# Patient Record
Sex: FEMALE | Race: BLACK OR AFRICAN AMERICAN | NOT HISPANIC OR LATINO | Employment: FULL TIME | URBAN - METROPOLITAN AREA
[De-identification: names, ages, dates, MRNs, and addresses within clinical notes are randomized per-mention and may not be internally consistent; named-entity substitution may affect disease eponyms.]

---

## 2016-11-10 LAB — ABO + RH BLD: NORMAL

## 2017-01-11 ENCOUNTER — ROUTINE PRENATAL (OUTPATIENT)
Dept: OBSTETRICS AND GYNECOLOGY | Facility: CLINIC | Age: 32
End: 2017-01-11
Payer: COMMERCIAL

## 2017-01-11 ENCOUNTER — LAB VISIT (OUTPATIENT)
Dept: LAB | Facility: OTHER | Age: 32
End: 2017-01-11
Attending: NURSE PRACTITIONER
Payer: COMMERCIAL

## 2017-01-11 VITALS
BODY MASS INDEX: 24.66 KG/M2 | SYSTOLIC BLOOD PRESSURE: 108 MMHG | DIASTOLIC BLOOD PRESSURE: 64 MMHG | WEIGHT: 145.94 LBS

## 2017-01-11 DIAGNOSIS — Z3A.16 16 WEEKS GESTATION OF PREGNANCY: ICD-10-CM

## 2017-01-11 DIAGNOSIS — Z3A.16 16 WEEKS GESTATION OF PREGNANCY: Primary | ICD-10-CM

## 2017-01-11 PROCEDURE — 36415 COLL VENOUS BLD VENIPUNCTURE: CPT

## 2017-01-11 PROCEDURE — 0502F SUBSEQUENT PRENATAL CARE: CPT | Mod: S$GLB,,, | Performed by: NURSE PRACTITIONER

## 2017-01-11 PROCEDURE — 81511 FTL CGEN ABNOR FOUR ANAL: CPT

## 2017-01-11 PROCEDURE — 99999 PR PBB SHADOW E&M-EST. PATIENT-LVL II: CPT | Mod: PBBFAC,,, | Performed by: NURSE PRACTITIONER

## 2017-01-11 NOTE — MR AVS SNAPSHOT
[facility] - OB/GYN [address]  [address]  [address]  [facility] [address]  Phone: [phone]  Fax: [phone]                  Sarah Lee   2017 11:20 AM   Routine Prenatal    Description:  Female : 1985   Provider:  Tameka Dennis NP   Department:  [facility] - OB/GYN [address]           Reason for Visit     Routine Prenatal Visit           Diagnoses this Visit        Comments    16 weeks gestation of pregnancy    -  Primary            To Do List           Future Appointments        Provider Department Dept Phone    2017 11:30 AM Vicki Teran MD [facility] - OB/GYN [address] [phone]    2017 10:20 AM ULTRASOUND, [facility] 4TH [facility]LR CL[facility] [place], operated by [facility] [phone]      Goals (5 Years of Data)     None      Follow-Up and Disposition     Return in about 3 weeks (around 2017) for OB FOLLOW-UP.      [facility]     [facility] Line -  Assistance  Registered nurses in the [facility] provide clinical advisement, health education, appointment booking, and other advisory services.  Call for this free service at [phone].             Medications           Message regarding Medications     Verify the changes and/or additions to your medication regime listed below are the same as discussed with your clinician today.  If any of these changes or additions are incorrect, please notify your healthcare provider.             Verify that the below list of medications is an accurate representation of the medications you are currently taking.  If none reported, the list may be blank. If incorrect, please contact your healthcare provider. Carry this list with you in case of emergency.           Current Medications     PRENATAL VIT #91/FE FUM/FA/DHA (PRENATAL + DHA ORAL) Take by mouth.           Clinical Reference Information           Prenatal Vitals     Enc. Date GA Prenatal Vitals Prenatal Pulse Pain Level Urine Albumin/Glucose  "Edema Presentation Dilation/Effacement/Station    17 16w0d 108/64 / 66.2 kg (145 lb 15.1 oz)  /  / Absent  0  None / None / None / No      16 12w0d 110/60 / 64.5 kg (142 lb 3.2 oz)  / US  0 Negative / Negative None / None      16 12w0d  / 64.5 kg (142 lb 3.2 oz)           11/10/16 7w1d 112/66 / 63.3 kg (139 lb 8.8 oz)  / U/S / Absent             TW.964 kg (10 lb 15.1 oz)   Pregravid weight: 61.2 kg (135 lb)   Height: 5' 4.5" (1.638 m)   BMI: 22.8       Vital Signs - Last Recorded  Most recent update: 2017 11:35 AM by Sarah Noe LPN    BP Wt LMP BMI       108/64 66.2 kg (145 lb 15.1 oz) 2016 (Exact Date) 24.66 kg/m2       Allergies as of 2017     No Known Allergies      Immunizations Administered on Date of Encounter - 2017     None      "

## 2017-01-11 NOTE — PROGRESS NOTES
"Here for routine OB appt at 16w0d, with no complaints.  Denies VB and cramping. Some "flutters".  Pain, bleeding, and PTL precautions given.   F/U scheduled 3 weeks; anatomy scan on 2/1/17; part two SS today.     "

## 2017-01-13 LAB
1ST SAMPLE DATE (SS2): NORMAL
2ND SAMPLE DATE (SS2): NORMAL
ALPHA FETOPROTEIN MATERNAL (SS2): 58.7 NG/ML
DOWN SYNDROME RISK (<1:110) (SS2): NORMAL
ETHNIC ORIGIN (SS2): NORMAL
GEST. AGE (DAYS) 1ST SAMPLE (SS2): 5
GEST. AGE (DAYS) 2ND SAMPLE (SS2): 5
GEST. AGE (WKS) 1ST SAMPLE (SS2): 12
GEST. AGE (WKS) 2ND SAMPLE (SS2): 16
GESTATIONAL AGE METHOD (SS2): NORMAL
HCG (SS2): 59.2 IU/ML
INHIBIN A (SS2): 148.6 PG/ML
INSULIN DEPEND. DIABETES (SS2): NORMAL
M.O.M. AFP  (<2.20) (SS2): 1.4
M.O.M. HCG (SS2): 1.73
M.O.M. INHIBIN A (SS2): 0.93
M.O.M. NUCHAL TRANSLUCENCY (SS2): 0.73
M.O.M. PAPP-A (SS2): 0.71
M.O.M. UNCONJ. ESTRIOL (SS2): 1.06
MATERNAL AGE AT EDD (YRS) (SS2): 32
MATERNAL AGE FOR DOWN (SS2): NORMAL
MATERNAL WEIGHT (LBS) (SS2): 146
MULTIPLE GESTATION (SS2): NORMAL
NASAL BONE (SS2): NORMAL
NUCHAL TRANSLUCENCY (SS2): 1.1 MM
PAPP-A (SS2): 871.6 NG/ML
SEQUENTIAL SCREEN PART 2 INTERP: NORMAL
SEQUENTIAL SCREEN PART 2: NEGATIVE
TRISOMY 18 (<1:100) (SS2): NORMAL
UNCONJUGATED ESTRIOL (SS2): 1.05 NG/ML

## 2017-02-01 ENCOUNTER — OFFICE VISIT (OUTPATIENT)
Dept: MATERNAL FETAL MEDICINE | Facility: CLINIC | Age: 32
End: 2017-02-01
Payer: COMMERCIAL

## 2017-02-01 DIAGNOSIS — Z36.89 ENCOUNTER FOR FETAL ANATOMIC SURVEY: ICD-10-CM

## 2017-02-01 PROCEDURE — 76805 OB US >/= 14 WKS SNGL FETUS: CPT | Mod: 26,S$GLB,, | Performed by: PEDIATRICS

## 2017-02-01 PROCEDURE — 99499 UNLISTED E&M SERVICE: CPT | Mod: S$GLB,,, | Performed by: PEDIATRICS

## 2017-02-02 NOTE — PROGRESS NOTES
Indication:     Anatomy survey (KARISSA JO ANN).   Maternal age (31 years).   Sequential II Screen Negative; DSR 1: 40,000.   ____________________________________________________________________________  History:   Age: 31 years.  ____________________________________________________________________________  Dating:    LMP: 09/21/16 EDC: 06/28/17 GA by LMP: 19w0d  Current Scan on: 02/01/17 EDC: 06/23/17 GA by current scan: 19w5d      Best Overall Assessment: 11/10/16 EDC: 06/28/17 Assessed GA: 19w0d  The calculation of the gestational age by current scan was based on BPD, OFD, HC, TCD, AC, FL and HUM.  The Best Overall Assessment is based on the LMP.  ____________________________________________________________________________  Anatomy Scan:    [place] gestation.    Biometry:    BPD 47.0 mm 91st% 20w2d (19w4d to 20w6d)  .8 mm 71st% 19w4d (18w1d to 21w1d)  .1 mm 73rd% 19w6d (19w1d to 20w4d)  FL 31.5 mm 72nd% 19w5d (18w0d to 21w4d)  OFD 59.0 mm 84th% 19w5d  TCD 19.4 mm 60th% 19w2d  HUM 29.4 mm 68th% 19w5d  CM 4.5 mm 42nd%  NUCHAL FOLD 3.72 mm  EFW (lbs/oz) 0 lbs 11 ozs  EFW (g) 314 g  87th%     Fetal heart activity: present. Fetal heart rate: 149 bpm.   Fetal presentation: breech.   Amniotic fluid: normal.   Cord: 3 vessels.   Placenta: anterior fundal.     Fetal Anatomy:    Visualized with normal appearance: head, spine, neck, skin, chest, abdominal wall, gastrointestinal tract, kidneys, bladder.    Brain: Visualized and normal appearance: cerebral ventricles, choroid plexus, Cisterna Magna, midline falx, cerebellum, cavum septi pellucidi.  Face: profile normal, nose normal, lip normal.  Heart: Visualized and normal appearance: four-chamber view, left outflow tract, right outflow tract, Ductal arch, inferior vena cava, superior vena cava.   Angle: to the fetal left. Four-chamber view: septum appears intact. Aortic arch: Normal.   3 vessel trachial view.  Genitalia: Male fetus.   Extremities: 4 limbs.  Plantar surface of the feet wnl, open hands bilaterally.    Summary of Ultrasound Findings:  Transabdominal US. U/S machine: Xsilon E10.       Impression: The fetal anatomy survey appears normal and fetal growth is appropriate.    ____________________________________________________________________________  Maternal Structures:    Cervical length 33.9 mm.  ____________________________________________________________________________  Report Summary:    Impression:     Normal fetal anatomy with no obvious abnormalities.    Fetal biometry is consistent and concordant with dating.     Normal amniotic fluid volume per qualitative assessment.  Normal placental location without evidence of previa.    Normal appearing cervical length per trans-abdominal screening.     Recommendations:     Suggest repeat scan as you feel clinically indicated.    Thanks once again for allowing us to participate in the care of your patients.  If you have any questions concerning today's consultation feel free to contact me or one of my partners.  We can be reached at [phone] during normal business hours.  If you have a question after normal business hours, please contact Labor and Delivery [phone] and the unit secretary will page our on call physician.

## 2017-02-08 ENCOUNTER — ROUTINE PRENATAL (OUTPATIENT)
Dept: OBSTETRICS AND GYNECOLOGY | Facility: CLINIC | Age: 32
End: 2017-02-08
Payer: COMMERCIAL

## 2017-02-08 VITALS — BODY MASS INDEX: 25.26 KG/M2 | DIASTOLIC BLOOD PRESSURE: 60 MMHG | WEIGHT: 149.5 LBS | SYSTOLIC BLOOD PRESSURE: 100 MMHG

## 2017-02-08 DIAGNOSIS — Z3A.20 20 WEEKS GESTATION OF PREGNANCY: Primary | ICD-10-CM

## 2017-02-08 PROCEDURE — 0502F SUBSEQUENT PRENATAL CARE: CPT | Mod: S$GLB,,, | Performed by: OBSTETRICS & GYNECOLOGY

## 2017-02-08 PROCEDURE — 99999 PR PBB SHADOW E&M-EST. PATIENT-LVL II: CPT | Mod: PBBFAC,,, | Performed by: OBSTETRICS & GYNECOLOGY

## 2017-02-08 NOTE — MR AVS SNAPSHOT
[facility] - [facility] [address]  [address]  [facility] [address]  Phone: [phone]  Fax: [phone]                  Sarah Lee   2017 11:00 AM   Routine Prenatal    Description:  Female : 1985   Provider:  Vicki Teran MD   Department:  [facility] - [facility] [address]           Reason for Visit     Routine Prenatal Visit                To Do List           Goals (5 Years of Data)     None      [facility]     [facility] Line -  Assistance  Registered nurses in the [facility] provide clinical advisement, health education, appointment booking, and other advisory services.  Call for this free service at [phone].             Medications           Message regarding Medications     Verify the changes and/or additions to your medication regime listed below are the same as discussed with your clinician today.  If any of these changes or additions are incorrect, please notify your healthcare provider.             Verify that the below list of medications is an accurate representation of the medications you are currently taking.  If none reported, the list may be blank. If incorrect, please contact your healthcare provider. Carry this list with you in case of emergency.           Current Medications     PRENATAL VIT #91/FE FUM/FA/DHA (PRENATAL + DHA ORAL) Take by mouth.           Clinical Reference Information           Prenatal Vitals     Enc. Date GA Prenatal Vitals Prenatal Pulse Pain Level Urine Albumin/Glucose Edema Presentation Dilation/Effacement/Station    17 20w0d 100/60 / 67.8 kg (149 lb 7.6 oz)   0 Negative / Negative       17 16w0d 108/64 / 66.2 kg (145 lb 15.1 oz)  / 150 / Absent  0  None / None / None / No      16 12w0d 110/60 / 64.5 kg (142 lb 3.2 oz)  / US  0 Negative / Negative None / None      16 12w0d  / 64.5 kg (142 lb 3.2 oz)           11/10/16 7w1d 112/66 / 63.3 kg (139 lb 8.8 oz)  / U/S / Absent     "         TW.564 kg (14 lb 7.6 oz)   Pregravid weight: 61.2 kg (135 lb)   Height: 5' 4.5" (1.638 m)   BMI: 22.8       Your Vitals Were     BP Weight Last Period BMI       100/60 67.8 kg (149 lb 7.6 oz) 2016 (Exact Date) 25.26 kg/m2       Allergies as of 2017     No Known Allergies      Immunizations Administered on Date of Encounter - 2017     None      Language Assistance Services     ATTENTION: Language assistance services are available, free of charge. Please call [phone].      ATENCIÓN: Si habla español, tiene a gavin disposición servicios gratuitos de asistencia lingüística. Llame al [phone].     CHÚ Ý: N?u b?n nói Ti?ng Vi?t, có các d?ch v? h? tr? ngôn ng? mi?n phí dành cho b?n. G?i s? [phone].         [facility] - [facility] [address] complies with applicable Federal civil rights laws and does not discriminate on the basis of race, color, national origin, age, disability, or sex.        "

## 2017-02-08 NOTE — PROGRESS NOTES
HERE for routine OB visit at 20 0/7 wks, with NO complaints.  Denies vaginal bleeding, cramping/ ctx, or LOF.  + FM.  [facility] BID.   OB screen CBC. F /U in four weeks.

## 2017-03-08 ENCOUNTER — ROUTINE PRENATAL (OUTPATIENT)
Dept: OBSTETRICS AND GYNECOLOGY | Facility: CLINIC | Age: 32
End: 2017-03-08
Payer: COMMERCIAL

## 2017-03-08 VITALS
BODY MASS INDEX: 25.34 KG/M2 | DIASTOLIC BLOOD PRESSURE: 62 MMHG | WEIGHT: 149.94 LBS | SYSTOLIC BLOOD PRESSURE: 108 MMHG

## 2017-03-08 DIAGNOSIS — Z3A.24 24 WEEKS GESTATION OF PREGNANCY: Primary | ICD-10-CM

## 2017-03-08 PROCEDURE — 0502F SUBSEQUENT PRENATAL CARE: CPT | Mod: S$GLB,,, | Performed by: NURSE PRACTITIONER

## 2017-03-08 PROCEDURE — 99999 PR PBB SHADOW E&M-EST. PATIENT-LVL II: CPT | Mod: PBBFAC,,, | Performed by: NURSE PRACTITIONER

## 2017-03-08 NOTE — MR AVS SNAPSHOT
[facility] - [facility] [address]  [address]  [address]  [facility] [address]  Phone: [phone]  Fax: [phone]                  Sarah Lee   3/8/2017 11:20 AM   Routine Prenatal    Description:  Female : 1985   Provider:  Tameka Dennis NP   Department:  [facility] - [facility] [address]           Reason for Visit     Routine Prenatal Visit                To Do List           Goals (5 Years of Data)     None      [facility]     [facility] -  Assistance  Registered nurses in the [facility] provide clinical advisement, health education, appointment booking, and other advisory services.  Call for this free service at [phone].             Medications           Message regarding Medications     Verify the changes and/or additions to your medication regime listed below are the same as discussed with your clinician today.  If any of these changes or additions are incorrect, please notify your healthcare provider.             Verify that the below list of medications is an accurate representation of the medications you are currently taking.  If none reported, the list may be blank. If incorrect, please contact your healthcare provider. Carry this list with you in case of emergency.           Current Medications     PRENATAL VIT #91/FE FUM/FA/DHA (PRENATAL + DHA ORAL) Take by mouth.           Clinical Reference Information           Prenatal Vitals     Enc. Date GA Prenatal Vitals Prenatal Pulse Pain Level Urine Albumin/Glucose Edema Presentation Dilation/Effacement/Station    3/8/17 24w0d 108/62 / 68 kg (149 lb 14.6 oz)   0 Negative / Negative       17 20w0d 100/60 / 67.8 kg (149 lb 7.6 oz)  / 150 / Present  0 Negative / Negative None / None / None      17 16w0d 108/64 / 66.2 kg (145 lb 15.1 oz)  / 150 / Absent  0  None / None / None / No      16 12w0d 110/60 / 64.5 kg (142 lb 3.2 oz)  / US  0 Negative / Negative None / None       "16 12w0d  / 64.5 kg (142 lb 3.2 oz)           11/10/16 7w1d 112/66 / 63.3 kg (139 lb 8.8 oz)  / U/S / Absent             TW.764 kg (14 lb 14.6 oz)   Pregravid weight: 61.2 kg (135 lb)   Height: 5' 4.5" (1.638 m)   BMI: 22.8       Your Vitals Were     BP Weight Last Period BMI       108/62 68 kg (149 lb 14.6 oz) 2016 (Exact Date) 25.34 kg/m2       Allergies as of 3/8/2017     No Known Allergies      Immunizations Administered on Date of Encounter - 3/8/2017     None      Language Assistance Services     ATTENTION: Language assistance services are available, free of charge. Please call [phone].      ATENCIÓN: Si habmarco a ashton, tiene a gavin disposición servicios gratuitos de asistencia lingüística. Llame al [phone].     CHÚ Ý: N?u b?n nói Ti?ng Vi?t, có các d?ch v? h? tr? ngôn ng? mi?n phí dành cho b?n. G?i s? [phone].         [facility] - [facility] [address] complies with applicable Federal civil rights laws and does not discriminate on the basis of race, color, national origin, age, disability, or sex.        "

## 2017-03-08 NOTE — PROGRESS NOTES
Here for routine OB appt at 24w0d, with no complaints.  Denies VB and cramping. +FM  Pain, bleeding, and PTL precautions given.  F/U scheduled 4 weeks; CBC and glucose screen at next visit.

## 2017-03-22 ENCOUNTER — LAB VISIT (OUTPATIENT)
Dept: LAB | Facility: OTHER | Age: 32
End: 2017-03-22
Attending: OBSTETRICS & GYNECOLOGY
Payer: COMMERCIAL

## 2017-03-22 ENCOUNTER — ROUTINE PRENATAL (OUTPATIENT)
Dept: OBSTETRICS AND GYNECOLOGY | Facility: CLINIC | Age: 32
End: 2017-03-22
Payer: COMMERCIAL

## 2017-03-22 ENCOUNTER — PATIENT MESSAGE (OUTPATIENT)
Dept: OBSTETRICS AND GYNECOLOGY | Facility: CLINIC | Age: 32
End: 2017-03-22

## 2017-03-22 VITALS — BODY MASS INDEX: 25.6 KG/M2 | SYSTOLIC BLOOD PRESSURE: 120 MMHG | DIASTOLIC BLOOD PRESSURE: 60 MMHG | WEIGHT: 151.44 LBS

## 2017-03-22 DIAGNOSIS — Z3A.20 20 WEEKS GESTATION OF PREGNANCY: ICD-10-CM

## 2017-03-22 DIAGNOSIS — Z3A.26 26 WEEKS GESTATION OF PREGNANCY: Primary | ICD-10-CM

## 2017-03-22 LAB
BASOPHILS # BLD AUTO: 0.01 K/UL
BASOPHILS NFR BLD: 0.1 %
DIFFERENTIAL METHOD: ABNORMAL
EOSINOPHIL # BLD AUTO: 0.1 K/UL
EOSINOPHIL NFR BLD: 1.2 %
ERYTHROCYTE [DISTWIDTH] IN BLOOD BY AUTOMATED COUNT: 13.4 %
GLUCOSE SERPL-MCNC: 104 MG/DL
HCT VFR BLD AUTO: 33.9 %
HGB BLD-MCNC: 11.9 G/DL
LYMPHOCYTES # BLD AUTO: 1.2 K/UL
LYMPHOCYTES NFR BLD: 13.7 %
MCH RBC QN AUTO: 27.7 PG
MCHC RBC AUTO-ENTMCNC: 35.1 %
MCV RBC AUTO: 79 FL
MONOCYTES # BLD AUTO: 0.7 K/UL
MONOCYTES NFR BLD: 7.8 %
NEUTROPHILS # BLD AUTO: 6.8 K/UL
NEUTROPHILS NFR BLD: 76.5 %
PLATELET # BLD AUTO: 236 K/UL
PMV BLD AUTO: 9.5 FL
RBC # BLD AUTO: 4.3 M/UL
WBC # BLD AUTO: 8.84 K/UL

## 2017-03-22 PROCEDURE — 85025 COMPLETE CBC W/AUTO DIFF WBC: CPT

## 2017-03-22 PROCEDURE — 36415 COLL VENOUS BLD VENIPUNCTURE: CPT

## 2017-03-22 PROCEDURE — 82950 GLUCOSE TEST: CPT

## 2017-03-22 PROCEDURE — 99999 PR PBB SHADOW E&M-EST. PATIENT-LVL II: CPT | Mod: PBBFAC,,, | Performed by: OBSTETRICS & GYNECOLOGY

## 2017-03-22 PROCEDURE — 0502F SUBSEQUENT PRENATAL CARE: CPT | Mod: S$GLB,,, | Performed by: OBSTETRICS & GYNECOLOGY

## 2017-03-22 NOTE — PROGRESS NOTES
HERE for routine OB visit at 26 0/7 wks, with pressure in her pelvis.  DIscomfort.  Cannot lift objects at work/ [employer] as prior to pregnancy.  It is causing pain and pressure.  Work excuse. Not to lift over 20 l bs.

## 2017-03-22 NOTE — MR AVS SNAPSHOT
[facility] [address]  [address]  [facility] [address]  Phone: [phone]  Fax: [phone]                  Sarah Lee   3/22/2017 11:15 AM   Routine Prenatal    Description:  Female : 1985   Provider:  Vicki Teran MD   Department:  [facility] - [facility] [address]           Reason for Visit     Routine Prenatal Visit     Swelling     Weakness                To Do List           Goals (5 Years of Data)     None      [facility]     [facility] Line -  Assistance  Registered nurses in the [facility] provide clinical advisement, health education, appointment booking, and other advisory services.  Call for this free service at [phone].             Medications           Message regarding Medications     Verify the changes and/or additions to your medication regime listed below are the same as discussed with your clinician today.  If any of these changes or additions are incorrect, please notify your healthcare provider.             Verify that the below list of medications is an accurate representation of the medications you are currently taking.  If none reported, the list may be blank. If incorrect, please contact your healthcare provider. Carry this list with you in case of emergency.           Current Medications     PRENATAL VIT #91/FE FUM/FA/DHA (PRENATAL + DHA ORAL) Take by mouth.           Clinical Reference Information           Prenatal Vitals     Enc. Date GA Prenatal Vitals Prenatal Pulse Pain Level Urine Albumin/Glucose Edema Presentation Dilation/Effacement/Station    3/22/17 26w0d 120/60 / 68.7 kg (151 lb 7.3 oz)   0 Negative / Negative       3/8/17 24w0d 108/62 / 68 kg (149 lb 14.6 oz) 24 cm / 150 / Present  0 Negative / Negative None / None / None      17 20w0d 100/60 / 67.8 kg (149 lb 7.6 oz)  / 150 / Present  0 Negative / Negative None / None / None      17 16w0d 108/64 / 66.2 kg (145 lb 15.1 oz)  / 150 /  "Absent  0  None / None / None / No      16 12w0d 110/60 / 64.5 kg (142 lb 3.2 oz)  / US  0 Negative / Negative None / None      16 12w0d  / 64.5 kg (142 lb 3.2 oz)           11/10/16 7w1d 112/66 / 63.3 kg (139 lb 8.8 oz)  / U/S / Absent             TW.465 kg (16 lb 7.3 oz)   Pregravid weight: 61.2 kg (135 lb)   Height: 5' 4.5" (1.638 m)   BMI: 22.8       Your Vitals Were     BP Weight Last Period BMI       120/60 68.7 kg (151 lb 7.3 oz) 2016 (Exact Date) 25.6 kg/m2       Allergies as of 3/22/2017     No Known Allergies      Immunizations Administered on Date of Encounter - 3/22/2017     None      Language Assistance Services     ATTENTION: Language assistance services are available, free of charge. Please call [phone].      ATENCIÓN: Si habla español, tiene a gavin disposición servicios gratuitos de asistencia lingüística. Llame al [phone].     JAN Ý: N?u b?n nói Ti?ng Vi?t, có các d?ch v? h? tr? ngôn ng? mi?n phí dành cho b?n. G?i s? [phone].         [facility] [address] complies with applicable Federal civil rights laws and does not discriminate on the basis of race, color, national origin, age, disability, or sex.        "

## 2017-03-30 ENCOUNTER — PATIENT MESSAGE (OUTPATIENT)
Dept: OBSTETRICS AND GYNECOLOGY | Facility: CLINIC | Age: 32
End: 2017-03-30

## 2017-04-01 ENCOUNTER — HOSPITAL ENCOUNTER (EMERGENCY)
Facility: OTHER | Age: 32
Discharge: HOME | End: 2017-04-01
Payer: COMMERCIAL

## 2017-04-01 ENCOUNTER — NURSE TRIAGE (OUTPATIENT)
Dept: ADMINISTRATIVE | Facility: CLINIC | Age: 32
End: 2017-04-01

## 2017-04-01 VITALS
HEART RATE: 97 BPM | TEMPERATURE: 97 F | OXYGEN SATURATION: 100 % | DIASTOLIC BLOOD PRESSURE: 62 MMHG | SYSTOLIC BLOOD PRESSURE: 101 MMHG | RESPIRATION RATE: 17 BRPM

## 2017-04-01 DIAGNOSIS — Z3A.27 27 WEEKS GESTATION OF PREGNANCY: ICD-10-CM

## 2017-04-01 DIAGNOSIS — O26.893 LOW BACK PAIN DURING PREGNANCY IN THIRD TRIMESTER: ICD-10-CM

## 2017-04-01 DIAGNOSIS — M54.50 LOW BACK PAIN DURING PREGNANCY IN THIRD TRIMESTER: ICD-10-CM

## 2017-04-01 DIAGNOSIS — O23.43 UTI IN PREGNANCY, ANTEPARTUM, THIRD TRIMESTER: Primary | ICD-10-CM

## 2017-04-01 LAB
BACTERIA #/AREA URNS HPF: ABNORMAL /HPF
BILIRUB SERPL-MCNC: NORMAL MG/DL
BILIRUB UR QL STRIP: NEGATIVE
BLOOD URINE, POC: NORMAL
CLARITY UR: ABNORMAL
COLOR UR: YELLOW
COLOR, POC UA: YELLOW
GLUCOSE UR QL STRIP: NEGATIVE
GLUCOSE UR QL STRIP: NORMAL
HGB UR QL STRIP: ABNORMAL
HYALINE CASTS #/AREA URNS LPF: 0 /LPF
KETONES UR QL STRIP: NEGATIVE
KETONES UR QL STRIP: NORMAL
LEUKOCYTE ESTERASE UR QL STRIP: NEGATIVE
LEUKOCYTE ESTERASE URINE, POC: NORMAL
MICROSCOPIC COMMENT: ABNORMAL
NITRITE UR QL STRIP: NEGATIVE
NITRITE, POC UA: NORMAL
PH UR STRIP: 6 [PH] (ref 5–8)
PH, POC UA: NORMAL
PROT UR QL STRIP: ABNORMAL
PROTEIN, POC: NORMAL
RBC #/AREA URNS HPF: 3 /HPF (ref 0–4)
SP GR UR STRIP: 1.02 (ref 1–1.03)
SPECIFIC GRAVITY, POC UA: NORMAL
SQUAMOUS #/AREA URNS HPF: 16 /HPF
URN SPEC COLLECT METH UR: ABNORMAL
UROBILINOGEN UR STRIP-ACNC: NEGATIVE EU/DL
UROBILINOGEN, POC UA: NORMAL
WBC #/AREA URNS HPF: 2 /HPF (ref 0–5)

## 2017-04-01 PROCEDURE — 96365 THER/PROPH/DIAG IV INF INIT: CPT

## 2017-04-01 PROCEDURE — 59025 FETAL NON-STRESS TEST: CPT | Mod: 26,,, | Performed by: OBSTETRICS & GYNECOLOGY

## 2017-04-01 PROCEDURE — 87086 URINE CULTURE/COLONY COUNT: CPT

## 2017-04-01 PROCEDURE — 99284 EMERGENCY DEPT VISIT MOD MDM: CPT | Mod: 25

## 2017-04-01 PROCEDURE — 99284 EMERGENCY DEPT VISIT MOD MDM: CPT | Mod: 25,,, | Performed by: OBSTETRICS & GYNECOLOGY

## 2017-04-01 PROCEDURE — 59025 FETAL NON-STRESS TEST: CPT

## 2017-04-01 PROCEDURE — 81002 URINALYSIS NONAUTO W/O SCOPE: CPT

## 2017-04-01 PROCEDURE — 25000003 PHARM REV CODE 250: Performed by: STUDENT IN AN ORGANIZED HEALTH CARE EDUCATION/TRAINING PROGRAM

## 2017-04-01 PROCEDURE — 63600175 PHARM REV CODE 636 W HCPCS: Performed by: STUDENT IN AN ORGANIZED HEALTH CARE EDUCATION/TRAINING PROGRAM

## 2017-04-01 PROCEDURE — 81000 URINALYSIS NONAUTO W/SCOPE: CPT

## 2017-04-01 PROCEDURE — 99283 EMERGENCY DEPT VISIT LOW MDM: CPT | Mod: 25

## 2017-04-01 RX ORDER — ACETAMINOPHEN 325 MG/1
650 TABLET ORAL ONCE
Status: COMPLETED | OUTPATIENT
Start: 2017-04-01 | End: 2017-04-01

## 2017-04-01 RX ORDER — NITROFURANTOIN 25; 75 MG/1; MG/1
100 CAPSULE ORAL 2 TIMES DAILY
Qty: 10 CAPSULE | Refills: 0 | Status: SHIPPED | OUTPATIENT
Start: 2017-04-01 | End: 2017-04-06

## 2017-04-01 RX ADMIN — CEFTRIAXONE SODIUM 1 G: 1 INJECTION, POWDER, FOR SOLUTION INTRAMUSCULAR; INTRAVENOUS at 05:04

## 2017-04-01 RX ADMIN — SODIUM CHLORIDE, SODIUM LACTATE, POTASSIUM CHLORIDE, AND CALCIUM CHLORIDE 500 ML: .6; .31; .03; .02 INJECTION, SOLUTION INTRAVENOUS at 05:04

## 2017-04-01 RX ADMIN — ACETAMINOPHEN 650 MG: 325 TABLET ORAL at 05:04

## 2017-04-01 NOTE — ED PROVIDER NOTES
Encounter Date: 2017       History     Chief Complaint   Patient presents with    Back Pain     Review of patient's allergies indicates:  No Known Allergies  HPI Comments: Sarah Lee is a 31 y.o. [id number] at 27w3d presents complaining of severe back pain. Onset was gradual beginning 2 days ago. States the pain started upper right back but is now in lower back. Any type of movement makes it worse. Nothing makes it better. She has tried hot baths and tylenol. Patient works as a pol[employer] and thinks maybe this has worsened her back pain. She has not felt this type of pain in the past. She denies dysuria, hematuria. Denies F/C, N/V at home. Last dose tylenol this morning around 1000. This IUP is complicated by h/o pre-eclampsia with prior pregnancy and h/o GI bleed due to gastric ulcer.  Patient denies contractions, denies vaginal bleeding, denies LOF.   Fetal Movement: normal.       Past Medical History:   Diagnosis Date    GERD (gastroesophageal reflux disease)     History of bleeding ulcers     upper stomach    Ovarian cyst, left     Stomach ulcer     Tooth abscess     left lower tooth abcess    Ulcer      History reviewed. No pertinent surgical history.  Family History   Problem Relation Age of Onset    Diabetes Paternal Grandmother     Hypertension Paternal Grandmother     Diabetes Maternal Grandmother     Hypertension Maternal Grandmother     Diabetes Mother     Hypertension Mother     Pancreatic cancer Mother 55    Coronary artery disease Father 50      of an MI age 61    Breast cancer Neg Hx     Colon cancer Neg Hx     Ovarian cancer Neg Hx      Social History   Substance Use Topics    Smoking status: Never Smoker    Smokeless tobacco: Never Used    Alcohol use No     Review of Systems   Constitutional: Negative for chills and fever.   HENT: Negative for congestion.    Eyes: Negative for visual disturbance.   Respiratory: Negative for shortness of breath.    Cardiovascular:  Negative for chest pain.   Gastrointestinal: Negative for abdominal pain, nausea and vomiting.   Genitourinary: Negative for dysuria and vaginal bleeding.   Musculoskeletal: Positive for back pain.   Neurological: Negative for headaches.       Physical Exam   Initial Vitals   BP Pulse Resp Temp SpO2   04/01/17 1701 04/01/17 1701 04/01/17 1702 04/01/17 1702 04/01/17 1702   122/75 96 17 97.2 °F (36.2 °C) 100 %     Physical Exam    Constitutional: She appears well-developed and well-nourished. No distress.   HENT:   Head: Normocephalic and atraumatic.   Eyes: Conjunctivae and EOM are normal.   Neck: Normal range of motion. Neck supple.   Cardiovascular: Normal rate and regular rhythm.   Pulmonary/Chest: Breath sounds normal. No respiratory distress.   Abdominal: Soft. She exhibits no distension. There is no tenderness. There is no rebound and no guarding.   Genitourinary:   Genitourinary Comments: No CVA tenderness   Musculoskeletal: Normal range of motion. She exhibits no edema.   No pinpoint tenderness noted   Neurological: She is alert and oriented to person, place, and time.   Skin: Skin is warm and dry.   Psychiatric: She has a normal mood and affect.         ED Course   Procedures  Labs Reviewed   POCT URINALYSIS, DIPSTICK OR TABLET REAGENT, AUTOMATED, WITH MICROSCOP        NST: 140 bpm, mod btbv, +accels, no decels  Gratz: no contractions     Medical Decision Making:   History:   Old Medical Records: I decided to obtain old medical records.  Old Records Summarized: records from clinic visits.       <> Summary of Records: Routine [facility]  Clinical Tests:   Lab Tests: Ordered and Reviewed  Medical Tests: Ordered and Reviewed       APC / Resident Notes:   Back pain, no CVA tenderness  Urine dip with 1+ blood and trace leuks  Fluids, Rocephin x 1, tylenol  Discharge with Macrobid and back care         Attending Attestation:   Physician Attestation Statement for Resident:  As the supervising MD   Physician Attestation  Statement: I have personally seen and examined this patient.   I agree with the above history. -:   As the supervising MD I agree with the above PE.    As the supervising MD I agree with the above treatment, course, plan, and disposition.   -: Patient is stable for discharge home.    I have reviewed and agree with the residents interpretation of the following: lab data and rhythm strips.  I have reviewed the following: old records at this facility.                    ED Course     Clinical Impression:   The primary encounter diagnosis was UTI in pregnancy, antepartum, third trimester. Diagnoses of Low back pain during pregnancy in third trimester and 27 weeks gestation of pregnancy were also pertinent to this visit.    Disposition:   Disposition: Discharged       Cassi Claire MD  Resident  04/01/17 7471

## 2017-04-01 NOTE — DISCHARGE INSTRUCTIONS
Call clinic at [phone] or L&D after hours at [phone] for vaginal bleeding like a period, leakage of fluids like your water broke, contractions in a regular frequent pattern, decreased fetal movements (10 kicks in 2 hours), headache not relieved by Tylenol, blurry vision, pain under your right breast, or temp of 100.4 or greater. Begin doing fetal kick counts, at least 10 movements in 2 hours starting at 28 weeks gestation. Keep next clinic appointment.

## 2017-04-01 NOTE — TELEPHONE ENCOUNTER
LM on VM x 1. Second call ended in busy signal. LM second time at 245pm.     Reason for Disposition   Message left on identified answering machine.    Protocols used: ST NO CONTACT OR DUPLICATE CONTACT CALL-A-AH

## 2017-04-01 NOTE — ED AVS SNAPSHOT
[facility]  [address]  [facility] [address]               Sarah Lee   2017  4:46 PM   ED    Description:  Female : 1985   Department:  [facility]           Your Care was Coordinated By:     Provider Role From To    Fifi Rendon MD Resident 17 [id number] --      Reason for Visit     Back Pain           Diagnoses this Visit        Comments    UTI in pregnancy, antepartum, third trimester    -  Primary     Low back pain during pregnancy in third trimester         27 weeks gestation of pregnancy           ED Disposition     ED Disposition Condition Comment    Discharge             To Do List           Follow-up Information     Follow up with Vicki Teran MD.    Specialties:  Obstetrics, Obstetrics and Gynecology    Contact information:    [address] [place] [address]  [facility] [address]  [phone]         These Medications        Disp Refills Start End    nitrofurantoin, macrocrystal-monohydrate, (MACROBID) 100 MG capsule 10 capsule 0 2017    Take 1 capsule (100 mg total) by mouth 2 (two) times daily. - Oral    Pharmacy: [facility] [address] [place] [address] AT [facility] Ph #: [phone]         [facility] On Call     [facility] Nurse Care Line - 24/ Assistance  Unless otherwise directed by your provider, please contact [facility] On-Call, our nurse care line that is available for 24/7 assistance.     Registered nurses in the [facility] Center provide: appointment scheduling, clinical advisement, health education, and other advisory services.  Call: [phone] (toll free)               Medications           Message regarding Medications     Verify the changes and/or additions to your medication regime listed below are the same as discussed with your clinician today.  If any of these changes or additions are incorrect, please notify your healthcare  provider.        START taking these NEW medications        Refills    nitrofurantoin, macrocrystal-monohydrate, (MACROBID) 100 MG capsule 0    Sig: Take 1 capsule (100 mg total) by mouth 2 (two) times daily.    Class: Normal    Route: Oral      These medications were administered today        Dose Freq    cefTRIAXone (ROCEPHIN) 1 g in dextrose 5 % 50 mL IVPB 1 g Once    Sig: Inject 50 mLs (1 g total) into the vein once.    Class: Normal    Route: Intravenous    lactated ringers bolus 500 mL 500 mL Once    Sig: Inject 500 mLs into the vein once.    Class: Normal    Route: Intravenous    acetaminophen tablet 650 mg 650 mg Once    Sig: Take 2 tablets (650 mg total) by mouth once.    Class: Normal    Route: Oral    acetaminophen (TYLENOL) 500 MG tablet      Notes to Pharmacy: Created by cabinet override           Verify that the below list of medications is an accurate representation of the medications you are currently taking.  If none reported, the list may be blank. If incorrect, please contact your healthcare provider. Carry this list with you in case of emergency.           Current Medications     nitrofurantoin, macrocrystal-monohydrate, (MACROBID) 100 MG capsule Take 1 capsule (100 mg total) by mouth 2 (two) times daily.    PRENATAL VIT #91/FE FUM/FA/DHA (PRENATAL + DHA ORAL) Take by mouth.           Clinical Reference Information           Your Vitals Were     BP Pulse Temp Resp Last Period SpO2    101/62 97 97.2 °F (36.2 °C) (Temporal) 17 09/21/2016 (Exact Date) 100%      Allergies as of 4/1/2017     No Known Allergies      Immunizations Administered on Date of Encounter - 4/1/2017     None      ED Micro, Lab, POCT     Start Ordered       Status Ordering Provider    04/01/17 1721 04/01/17 1720  Urinalysis  STAT      Final result     04/01/17 1721 04/01/17 1720  Urine culture  Once      In process     04/01/17 1720 04/01/17 1720  Urinalysis Microscopic  Once      Final result     04/01/17 1716 04/01/17 1715   POCT urinalysis, dipstick or tablet reag  Once      Final result       ED Imaging Orders     None        Discharge Instructions       Call clinic at [phone] or L&D after hours at [phone] for vaginal bleeding like a period, leakage of fluids like your water broke, contractions in a regular frequent pattern, decreased fetal movements (10 kicks in 2 hours), headache not relieved by Tylenol, blurry vision, pain under your right breast, or temp of 100.4 or greater. Begin doing fetal kick counts, at least 10 movements in 2 hours starting at 28 weeks gestation. Keep next clinic appointment.    Discharge References/Attachments     BACK PAIN DURING PREGNANCY, RELIEVING: TAILOR SIT, TRUNK TURN (ENGLISH)    BACK PAIN DURING PREGNANCY, RELIEVING: WALL STRETCH, BODY BEND (ENGLISH)      Your Scheduled Appointments     Apr 19, 2017 10:40 AM CDT   Routine Prenatal Visit with Tameka Dennis NP   [facility] [address] ([facility])    [address] [address] [phone]               [facility] complies with applicable Federal civil rights laws and does not discriminate on the basis of race, color, national origin, age, disability, or sex.        Language Assistance Services     ATTENTION: Language assistance services are available, free of charge. Please call [phone].      ATENCIÓN: Si habla español, tiene a gavin disposición servicios gratuitos de asistencia lingüística. Llame al [phone].     CHÚ Ý: N?u b?n nói Ti?ng Vi?t, có các d?ch v? h? tr? ngôn ng? mi?n phí dành cho b?n. G?i s? [phone].

## 2017-04-02 NOTE — ED NOTES
VSS. Discharge instructions provided to patient at bedside. Patient states understanding of instructions and willingness to comply. Patient ambulatory, escorted by nurse to reception.

## 2017-04-03 LAB
BACTERIA UR CULT: NORMAL
BACTERIA UR CULT: NORMAL

## 2017-04-19 ENCOUNTER — ROUTINE PRENATAL (OUTPATIENT)
Dept: OBSTETRICS AND GYNECOLOGY | Facility: CLINIC | Age: 32
End: 2017-04-19
Payer: COMMERCIAL

## 2017-04-19 VITALS
BODY MASS INDEX: 26.08 KG/M2 | SYSTOLIC BLOOD PRESSURE: 110 MMHG | WEIGHT: 154.31 LBS | DIASTOLIC BLOOD PRESSURE: 70 MMHG

## 2017-04-19 DIAGNOSIS — Z3A.30 30 WEEKS GESTATION OF PREGNANCY: Primary | ICD-10-CM

## 2017-04-19 DIAGNOSIS — Z87.440 HISTORY OF UTI: ICD-10-CM

## 2017-04-19 PROCEDURE — 87086 URINE CULTURE/COLONY COUNT: CPT

## 2017-04-19 PROCEDURE — 99999 PR PBB SHADOW E&M-EST. PATIENT-LVL II: CPT | Mod: PBBFAC,,, | Performed by: NURSE PRACTITIONER

## 2017-04-19 PROCEDURE — 0502F SUBSEQUENT PRENATAL CARE: CPT | Mod: S$GLB,,, | Performed by: NURSE PRACTITIONER

## 2017-04-19 NOTE — PROGRESS NOTES
Here for routine OB appt at 30w0d, with no complaints.  Reports good FM.  Denies LOF, denies VB, denies contractions. Went to [facility] on 4/1/17 with back pain. Dx and tx with macrobid for UTI. Urine cx CONCEPCION today. Denies UTI s/s.  Discussed in DETAIL with pt and FOB that [facility] does have an actual nursery, but the baby will be rooming in with mom as we do couplet care.   Reviewed warning signs of Labor and Preeclampsia.  Daily FM counts reinforced.  F/U scheduled 2 weeks

## 2017-04-21 LAB
BACTERIA UR CULT: NORMAL
BACTERIA UR CULT: NORMAL

## 2017-05-03 ENCOUNTER — ROUTINE PRENATAL (OUTPATIENT)
Dept: OBSTETRICS AND GYNECOLOGY | Facility: CLINIC | Age: 32
End: 2017-05-03
Payer: COMMERCIAL

## 2017-05-03 VITALS — SYSTOLIC BLOOD PRESSURE: 116 MMHG | BODY MASS INDEX: 27.72 KG/M2 | DIASTOLIC BLOOD PRESSURE: 78 MMHG | WEIGHT: 164 LBS

## 2017-05-03 DIAGNOSIS — Z3A.32 32 WEEKS GESTATION OF PREGNANCY: Primary | ICD-10-CM

## 2017-05-03 PROCEDURE — 0502F SUBSEQUENT PRENATAL CARE: CPT | Mod: S$GLB,,, | Performed by: NURSE PRACTITIONER

## 2017-05-03 PROCEDURE — 99999 PR PBB SHADOW E&M-EST. PATIENT-LVL II: CPT | Mod: PBBFAC,,, | Performed by: NURSE PRACTITIONER

## 2017-05-03 NOTE — MR AVS SNAPSHOT
[facility] [address]  [address]  [address]  [facility] [address]  Phone: [phone]  Fax: [phone]                  Sarah Lee   5/3/2017 10:20 AM   Routine Prenatal    Description:  Female : 1985   Provider:  Tameka Dennis NP   Department:  [facility] [address]           Reason for Visit     Routine Prenatal Visit                To Do List           Goals (5 Years of Data)     None      [facility]     [facility] On Call Nurse Care Line -  Assistance  Unless otherwise directed by your provider, please contact [facility], our nurse care line that is available for  assistance.     Registered nurses in the [facility] On Call Center provide: appointment scheduling, clinical advisement, health education, and other advisory services.  Call: [phone] (toll free)               Medications           Message regarding Medications     Verify the changes and/or additions to your medication regime listed below are the same as discussed with your clinician today.  If any of these changes or additions are incorrect, please notify your healthcare provider.             Verify that the below list of medications is an accurate representation of the medications you are currently taking.  If none reported, the list may be blank. If incorrect, please contact your healthcare provider. Carry this list with you in case of emergency.           Current Medications     PRENATAL VIT #91/FE FUM/FA/DHA (PRENATAL + DHA ORAL) Take by mouth.           Clinical Reference Information           Prenatal Vitals     Enc. Date GA Prenatal Vitals Prenatal Pulse Pain Level Urine Albumin/Glucose Edema Presentation Dilation/Effacement/Station    5/3/17 32w0d 116/78 / 74.4 kg (164 lb 0.4 oz)   0 Negative / Negative       17 30w0d 110/70 / 70 kg (154 lb 5.2 oz) 30 cm / +++ / Present  0 Negative / Negative None / None / None      17 27w3d Admission Dept: [facility] OB ER    3/22/17 26w0d  "120/60 / 68.7 kg (151 lb 7.3 oz) 26 cm / 150 / Present  0 Negative / Negative None / None / None      3/8/17 24w0d 108/62 / 68 kg (149 lb 14.6 oz) 24 cm / 150 / Present  0 Negative / Negative None / None / None      17 20w0d 100/60 / 67.8 kg (149 lb 7.6 oz)  / 150 / Present  0 Negative / Negative None / None / None      17 16w0d 108/64 / 66.2 kg (145 lb 15.1 oz)  / 150 / Absent  0  None / None / None / No      16 12w0d 110/60 / 64.5 kg (142 lb 3.2 oz)  / US  0 Negative / Negative None / None      16 12w0d  / 64.5 kg (142 lb 3.2 oz)           11/10/16 7w1d 112/66 / 63.3 kg (139 lb 8.8 oz)  / U/S / Absent             TW.2 kg (29 lb 0.4 oz)   Pregravid weight: 61.2 kg (135 lb)   Height: 5' 4.5" (1.638 m)   BMI: 22.8       Your Vitals Were     BP Weight Last Period BMI       116/78 74.4 kg (164 lb 0.4 oz) 2016 (Exact Date) 27.72 kg/m2       Allergies as of 5/3/2017     No Known Allergies      Immunizations Administered on Date of Encounter - 5/3/2017     None      Language Assistance Services     ATTENTION: Language assistance services are available, free of charge. Please call [phone].      ATENCIÓN: Si habla karmenañol, tiene a gavin disposición servicios gratuitos de asistencia lingüística. Llame al [phone].     CHÚ Ý: N?u b?n nói Ti?ng Vi?t, có các d?ch v? h? tr? ngôn ng? mi?n phí dành cho b?n. G?i s? [phone].         [facility] [address] complies with applicable Federal civil rights laws and does not discriminate on the basis of race, color, national origin, age, disability, or sex.        "

## 2017-05-03 NOTE — PROGRESS NOTES
Here for routine OB appt at 32w0d,  Here with her . C/o discomfort while at work. 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 Reports her feet and legs hurt during her shift. Discussed maternity belt, compression stockings, and hydration.  Reports good FM.  Denies LOF, denies VB, denies contractions. Discussed tdap-pt declines. Pt undecided about PP contraception. [facility] [place].   Reviewed warning signs of Labor and Preeclampsia.  Daily FM counts reinforced.  F/U scheduled 2 weeks

## 2017-05-17 ENCOUNTER — ROUTINE PRENATAL (OUTPATIENT)
Dept: OBSTETRICS AND GYNECOLOGY | Facility: CLINIC | Age: 32
End: 2017-05-17
Payer: COMMERCIAL

## 2017-05-17 VITALS — SYSTOLIC BLOOD PRESSURE: 110 MMHG | DIASTOLIC BLOOD PRESSURE: 88 MMHG | WEIGHT: 160.94 LBS | BODY MASS INDEX: 27.2 KG/M2

## 2017-05-17 DIAGNOSIS — R82.998 LEUKOCYTES IN URINE: ICD-10-CM

## 2017-05-17 DIAGNOSIS — Z3A.34 34 WEEKS GESTATION OF PREGNANCY: Primary | ICD-10-CM

## 2017-05-17 PROCEDURE — 87086 URINE CULTURE/COLONY COUNT: CPT

## 2017-05-17 PROCEDURE — 87077 CULTURE AEROBIC IDENTIFY: CPT

## 2017-05-17 PROCEDURE — 99999 PR PBB SHADOW E&M-EST. PATIENT-LVL III: CPT | Mod: PBBFAC,,, | Performed by: NURSE PRACTITIONER

## 2017-05-17 PROCEDURE — 0502F SUBSEQUENT PRENATAL CARE: CPT | Mod: S$GLB,,, | Performed by: NURSE PRACTITIONER

## 2017-05-17 PROCEDURE — 87186 SC STD MICRODIL/AGAR DIL: CPT

## 2017-05-17 PROCEDURE — 87088 URINE BACTERIA CULTURE: CPT

## 2017-05-17 NOTE — LETTER
May 17, 2017    Sarah Lee  [address]  [address]  [facility] [address]              [facility] [address]  [address] [facility] [address]  [facility] [address]  Phone: [phone]  Fax: [phone]    To Whom It May Concern:    Ms. Lee is currently under our care for pregnancy. Please allow pt frequent breaks throughout her shift to sit down and hydrate. She cannot do any heavy lifting or pushing over 10 pounds. Please call with any questions or concerns. Her due date is 6/28/17.        Sincerely,    Tameka Dennis, NP

## 2017-05-17 NOTE — PROGRESS NOTES
"Here for routine OB appt at 34w0d here with her  with c/o having episodes of feeling faint and lightheaded at work; some vaginal pressure and back pain. Pt works at [employer] with 8 hour shifts. States she has called in sick multiple times. Pt no longer wants to work, feels bad after 10 minutes. She and her  want a letter stating that. Rates pain 7/10 today- "everything hurts". 1/60/-3. BP WNL. Discussed frequent breaks/snacks and hydration. Told to go to ED if she ever passes out or the feeling does not spontaneously resolve. Reports good FM.  Denies LOF, denies VB, denies contractions.  Reviewed warning signs of Labor and Preeclampsia.  Daily FM counts reinforced.  F/U scheduled 2 weeks; 3T labs and GBS at next visit.     "

## 2017-05-19 ENCOUNTER — TELEPHONE (OUTPATIENT)
Dept: OBSTETRICS AND GYNECOLOGY | Facility: CLINIC | Age: 32
End: 2017-05-19

## 2017-05-19 ENCOUNTER — PATIENT MESSAGE (OUTPATIENT)
Dept: OBSTETRICS AND GYNECOLOGY | Facility: CLINIC | Age: 32
End: 2017-05-19

## 2017-05-19 DIAGNOSIS — O23.43 UTI IN PREGNANCY, THIRD TRIMESTER: Primary | ICD-10-CM

## 2017-05-19 LAB — BACTERIA UR CULT: NORMAL

## 2017-05-19 RX ORDER — NITROFURANTOIN 25; 75 MG/1; MG/1
100 CAPSULE ORAL 2 TIMES DAILY
Qty: 14 CAPSULE | Refills: 0 | Status: SHIPPED | OUTPATIENT
Start: 2017-05-19 | End: 2017-05-22 | Stop reason: SDUPTHER

## 2017-05-19 NOTE — TELEPHONE ENCOUNTER
----- Message from Tameka Dennis NP sent at 5/19/2017  9:31 AM CDT -----  Regarding: UTI-meds sent to pharmacy  Please notify pt that her urine culture showed a UTI. I sent in macrobid-1 pill twice daily x 7 days. Sent to [facility] [address]. We will repeat urine culture at next visit for a test of cure.     Thanks,   Tameka Dennis NP

## 2017-05-19 NOTE — TELEPHONE ENCOUNTER
Called patient and informed her of UTI and that her prescription of was called in per MELVINA Dennis NP note.

## 2017-05-21 ENCOUNTER — PATIENT MESSAGE (OUTPATIENT)
Dept: OBSTETRICS AND GYNECOLOGY | Facility: CLINIC | Age: 32
End: 2017-05-21

## 2017-05-22 ENCOUNTER — PATIENT MESSAGE (OUTPATIENT)
Dept: OBSTETRICS AND GYNECOLOGY | Facility: CLINIC | Age: 32
End: 2017-05-22

## 2017-05-22 RX ORDER — NITROFURANTOIN 25; 75 MG/1; MG/1
100 CAPSULE ORAL 2 TIMES DAILY
Qty: 14 CAPSULE | Refills: 0 | Status: SHIPPED | OUTPATIENT
Start: 2017-05-22 | End: 2017-05-29

## 2017-05-22 NOTE — TELEPHONE ENCOUNTER
Patient is requesting that UTI medication is sent to [facility] because [facility] does not take her insurance.

## 2017-06-02 ENCOUNTER — TELEPHONE (OUTPATIENT)
Dept: OBSTETRICS AND GYNECOLOGY | Facility: CLINIC | Age: 32
End: 2017-06-02

## 2017-06-02 ENCOUNTER — ROUTINE PRENATAL (OUTPATIENT)
Dept: OBSTETRICS AND GYNECOLOGY | Facility: CLINIC | Age: 32
End: 2017-06-02
Payer: COMMERCIAL

## 2017-06-02 ENCOUNTER — HOSPITAL ENCOUNTER (OUTPATIENT)
Dept: PERINATAL CARE | Facility: OTHER | Age: 32
Discharge: HOME | End: 2017-06-02
Attending: OBSTETRICS & GYNECOLOGY
Payer: COMMERCIAL

## 2017-06-02 VITALS
BODY MASS INDEX: 27.53 KG/M2 | SYSTOLIC BLOOD PRESSURE: 140 MMHG | DIASTOLIC BLOOD PRESSURE: 70 MMHG | WEIGHT: 162.94 LBS

## 2017-06-02 DIAGNOSIS — O13.3 GESTATIONAL HYPERTENSION, THIRD TRIMESTER: ICD-10-CM

## 2017-06-02 DIAGNOSIS — Z3A.36 36 WEEKS GESTATION OF PREGNANCY: Primary | ICD-10-CM

## 2017-06-02 LAB
CREAT UR-MCNC: 168 MG/DL
PROT UR-MCNC: 37 MG/DL
PROT/CREAT RATIO, UR: 0.22

## 2017-06-02 PROCEDURE — 76815 OB US LIMITED FETUS(S): CPT | Mod: 26,,, | Performed by: OBSTETRICS & GYNECOLOGY

## 2017-06-02 PROCEDURE — 0502F SUBSEQUENT PRENATAL CARE: CPT | Mod: S$GLB,,, | Performed by: OBSTETRICS & GYNECOLOGY

## 2017-06-02 PROCEDURE — 99999 PR PBB SHADOW E&M-EST. PATIENT-LVL III: CPT | Mod: PBBFAC,,, | Performed by: OBSTETRICS & GYNECOLOGY

## 2017-06-02 PROCEDURE — 84156 ASSAY OF PROTEIN URINE: CPT

## 2017-06-02 PROCEDURE — 76815 OB US LIMITED FETUS(S): CPT

## 2017-06-02 PROCEDURE — 59025 FETAL NON-STRESS TEST: CPT

## 2017-06-02 PROCEDURE — 59025 FETAL NON-STRESS TEST: CPT | Mod: 26,,, | Performed by: OBSTETRICS & GYNECOLOGY

## 2017-06-02 NOTE — PROGRESS NOTES
HERE for routine OB visit at 36 2/7 wks, with NO complaints.  Denies vaginal bleeding, cramping/ ctx, or LOF.  + FM.  FMC BID.    Elevated BP- no PRE E sx. History of PRE E.  PRE E labs, PNT twice weekly.  F/U in one week.

## 2017-06-02 NOTE — TELEPHONE ENCOUNTER
Spoke with pt. Pt offered/accepted an appt for 6/9 with  partner while she is out on vacation. Pt aware time/location/whom she will be visiting. Will mail slip

## 2017-06-02 NOTE — TELEPHONE ENCOUNTER
----- Message from Vargas Parker MA sent at 6/2/2017  3:05 PM CDT -----  Pt needs an appt next week with partner. Nothing avail

## 2017-06-03 ENCOUNTER — PATIENT MESSAGE (OUTPATIENT)
Dept: OBSTETRICS AND GYNECOLOGY | Facility: CLINIC | Age: 32
End: 2017-06-03

## 2017-06-03 NOTE — PROGRESS NOTES
Indication  ========    NST/MVP: elevated b/p.    Maternal Assessment  =================    BP syst 151 mmHg  BP diast 90 mmHg  Other: 141/81, 138/89    Pregnancy  =========    [place] pregnancy. Number of fetuses: 1.    Dating  ======    LMP on: 9/21/2016  GA by LMP 36 w + 2 d  PRANAV by LMP: 6/28/2017  Assigned: The Best Overall Assessment is based on the LMP.  Assigned GA 36 w + 2 d  Assigned PRANAV: 6/28/2017    General Evaluation  ==============    Cardiac activity: present.  bpm.  Fetal movements: visualized.  Presentation: cephalic.  Placenta: anterior.    Non Stress Test  =============    NST interpretation: reactive. Test duration 36 min. Baseline  bpm. Baseline variability: moderate. Accelerations: present.  Decelerations: absent. Uterine activity: present, irreggular. Acoustic stimulation: no  reports + fetal movement, denies reg ctx, vag bleeding or loss of fluid; denies headache, visual changes or epigastric pain. Reviewed FMC,  S&S PIH.    Amniotic Fluid Assessment  =====================    Amount of AF: normal amount  MVP 3.4 cm    Impression  =========    Discussed b/p with Dr Teran. repeat BP improved. Labs drawn outpatient. Oked for discharge. To F/U in PNT on Monday    Reactive NST;  Normal amniotic fluid volume.    Recommendation  ==============    Continue fetal surveillance as clinically indicated.

## 2017-06-05 ENCOUNTER — HOSPITAL ENCOUNTER (EMERGENCY)
Facility: OTHER | Age: 32
Discharge: HOME | End: 2017-06-05
Attending: OBSTETRICS & GYNECOLOGY
Payer: COMMERCIAL

## 2017-06-05 ENCOUNTER — HOSPITAL ENCOUNTER (OUTPATIENT)
Dept: PERINATAL CARE | Facility: OTHER | Age: 32
Discharge: HOME | End: 2017-06-05
Attending: OBSTETRICS & GYNECOLOGY
Payer: COMMERCIAL

## 2017-06-05 VITALS
RESPIRATION RATE: 16 BRPM | HEART RATE: 85 BPM | SYSTOLIC BLOOD PRESSURE: 135 MMHG | OXYGEN SATURATION: 100 % | TEMPERATURE: 97 F | DIASTOLIC BLOOD PRESSURE: 87 MMHG

## 2017-06-05 DIAGNOSIS — O13.3 GESTATIONAL HYPERTENSION, THIRD TRIMESTER: ICD-10-CM

## 2017-06-05 DIAGNOSIS — O14.93 PRE-ECLAMPSIA, ANTEPARTUM, THIRD TRIMESTER: Primary | ICD-10-CM

## 2017-06-05 DIAGNOSIS — Z3A.36 36 WEEKS GESTATION OF PREGNANCY: ICD-10-CM

## 2017-06-05 LAB
ALBUMIN SERPL BCP-MCNC: 2.8 G/DL
ALP SERPL-CCNC: 170 U/L
ALT SERPL W/O P-5'-P-CCNC: 9 U/L
ANION GAP SERPL CALC-SCNC: 8 MMOL/L
ANISOCYTOSIS BLD QL SMEAR: SLIGHT
AST SERPL-CCNC: 17 U/L
BASOPHILS # BLD AUTO: 0.02 K/UL
BASOPHILS NFR BLD: 0.2 %
BILIRUB SERPL-MCNC: 0.6 MG/DL
BUN SERPL-MCNC: 8 MG/DL
CALCIUM SERPL-MCNC: 8.8 MG/DL
CHLORIDE SERPL-SCNC: 108 MMOL/L
CO2 SERPL-SCNC: 21 MMOL/L
CREAT SERPL-MCNC: 0.7 MG/DL
CREAT UR-MCNC: 29.6 MG/DL
DIFFERENTIAL METHOD: ABNORMAL
EOSINOPHIL # BLD AUTO: 0.1 K/UL
EOSINOPHIL NFR BLD: 0.7 %
ERYTHROCYTE [DISTWIDTH] IN BLOOD BY AUTOMATED COUNT: 14.1 %
EST. GFR  (AFRICAN AMERICAN): >60 ML/MIN/1.73 M^2
EST. GFR  (NON AFRICAN AMERICAN): >60 ML/MIN/1.73 M^2
GLUCOSE SERPL-MCNC: 70 MG/DL
HCT VFR BLD AUTO: 33.7 %
HGB BLD-MCNC: 11.2 G/DL
HYPOCHROMIA BLD QL SMEAR: ABNORMAL
LDH SERPL L TO P-CCNC: 238 U/L
LYMPHOCYTES # BLD AUTO: 1.2 K/UL
LYMPHOCYTES NFR BLD: 14.6 %
MCH RBC QN AUTO: 24.9 PG
MCHC RBC AUTO-ENTMCNC: 33.2 %
MCV RBC AUTO: 75 FL
MONOCYTES # BLD AUTO: 0.9 K/UL
MONOCYTES NFR BLD: 10.6 %
NEUTROPHILS # BLD AUTO: 6 K/UL
NEUTROPHILS NFR BLD: 73.9 %
OVALOCYTES BLD QL SMEAR: ABNORMAL
PLATELET # BLD AUTO: 215 K/UL
PLATELET BLD QL SMEAR: ABNORMAL
PMV BLD AUTO: 9.8 FL
POIKILOCYTOSIS BLD QL SMEAR: SLIGHT
POLYCHROMASIA BLD QL SMEAR: ABNORMAL
POTASSIUM SERPL-SCNC: 4.3 MMOL/L
PROT SERPL-MCNC: 6.6 G/DL
PROT UR-MCNC: 14 MG/DL
PROT/CREAT RATIO, UR: 0.47
RBC # BLD AUTO: 4.5 M/UL
SODIUM SERPL-SCNC: 137 MMOL/L
WBC # BLD AUTO: 8.17 K/UL

## 2017-06-05 PROCEDURE — 87086 URINE CULTURE/COLONY COUNT: CPT

## 2017-06-05 PROCEDURE — 80053 COMPREHEN METABOLIC PANEL: CPT

## 2017-06-05 PROCEDURE — 76815 OB US LIMITED FETUS(S): CPT | Mod: 26,,, | Performed by: OBSTETRICS & GYNECOLOGY

## 2017-06-05 PROCEDURE — 36415 COLL VENOUS BLD VENIPUNCTURE: CPT

## 2017-06-05 PROCEDURE — 83615 LACTATE (LD) (LDH) ENZYME: CPT

## 2017-06-05 PROCEDURE — 82570 ASSAY OF URINE CREATININE: CPT

## 2017-06-05 PROCEDURE — 59025 FETAL NON-STRESS TEST: CPT | Mod: 26,,, | Performed by: OBSTETRICS & GYNECOLOGY

## 2017-06-05 PROCEDURE — 99283 EMERGENCY DEPT VISIT LOW MDM: CPT | Mod: ,,, | Performed by: OBSTETRICS & GYNECOLOGY

## 2017-06-05 PROCEDURE — 85025 COMPLETE CBC W/AUTO DIFF WBC: CPT

## 2017-06-05 PROCEDURE — 59025 FETAL NON-STRESS TEST: CPT

## 2017-06-05 PROCEDURE — 99283 EMERGENCY DEPT VISIT LOW MDM: CPT

## 2017-06-05 NOTE — ED PROVIDER NOTES
Encounter Date: 2017       History     Chief Complaint   Patient presents with    Hypertension     Review of patient's allergies indicates:  No Known Allergies  Sarah Lee is a 31 y.o. [id number] at 36w5d sent from prenatal testing for elevated BPs. Pt denies HA/vision changes/CP/SOB. Denies lightheadedness/dizziness. Denies RUQ or epigastric pain.  This IUP is complicated by history of pre-eclampsia.  Patient denies contractions, denies vaginal bleeding, denies LOF.   Fetal Movement: normal.          Past Medical History:   Diagnosis Date    GERD (gastroesophageal reflux disease)     Gestational hypertension     History of bleeding ulcers     upper stomach    Ovarian cyst, left     Stomach ulcer     Tooth abscess     left lower tooth abcess    Ulcer      History reviewed. No pertinent surgical history.  Family History   Problem Relation Age of Onset    Diabetes Paternal Grandmother     Hypertension Paternal Grandmother     Diabetes Maternal Grandmother     Hypertension Maternal Grandmother     Diabetes Mother     Hypertension Mother     Pancreatic cancer Mother 55    Coronary artery disease Father 50      of an MI age 61    Breast cancer Neg Hx     Colon cancer Neg Hx     Ovarian cancer Neg Hx      Social History   Substance Use Topics    Smoking status: Never Smoker    Smokeless tobacco: Never Used    Alcohol use No     Review of Systems   Constitutional: Negative for fever.   HENT: Negative for sore throat.    Respiratory: Negative for shortness of breath.    Cardiovascular: Negative for chest pain.   Gastrointestinal: Negative for nausea.   Genitourinary: Negative for dysuria.   Musculoskeletal: Negative for back pain.   Skin: Negative for rash.   Neurological: Negative for weakness and headaches.   Hematological: Does not bruise/bleed easily.       Physical Exam     Initial Vitals [17 1418]   BP Pulse Resp Temp SpO2   (!) 151/90 83 16 97 °F (36.1 °C) 100 %      Physical Exam    Nursing note and vitals reviewed.  Constitutional: She appears well-developed and well-nourished. She is not diaphoretic. No distress.   HENT:   Head: Normocephalic and atraumatic.   Eyes: Conjunctivae and EOM are normal.   Cardiovascular: Normal rate.   Pulmonary/Chest: No respiratory distress.   Musculoskeletal: Normal range of motion.   Neurological: She is alert and oriented to person, place, and time.   Skin: Skin is warm and dry.   Psychiatric: She has a normal mood and affect.     OB LABOR EXAM:                     Comments: NST Interpretation:   140 BPM baseline  Variability: moderate  Accelerations: present  Decelerations: absent  Contractions: none    Clinical Impression: Reactive Non-Stress Test         ED Course   Procedures  Labs Reviewed   COMPREHENSIVE METABOLIC PANEL - Abnormal; Notable for the following:        Result Value    CO2 21 (*)     Albumin 2.8 (*)     Alkaline Phosphatase 170 (*)     ALT 9 (*)     All other components within normal limits   CBC W/ AUTO DIFFERENTIAL - Abnormal; Notable for the following:     Hemoglobin 11.2 (*)     Hematocrit 33.7 (*)     MCV 75 (*)     MCH 24.9 (*)     Gran% 73.9 (*)     Lymph% 14.6 (*)     All other components within normal limits   PROTEIN / CREATININE RATIO, URINE - Abnormal; Notable for the following:     Prot/Creat Ratio, Ur 0.47 (*)     All other components within normal limits   LACTATE DEHYDROGENASE                   APC / Resident Notes:   BPs normal to mild range overall with isolated non-sustained severe range, did not require IV push  Labs remarkable for proteinuria with P:C 0.46  Scheduled for IOL 6/8/17 at 37w1d for pre-eclampsia without SF  Has PNT 37w0d              ED Course     Clinical Impression:   The primary encounter diagnosis was Pre-eclampsia, antepartum, third trimester. A diagnosis of 36 weeks gestation of pregnancy was also pertinent to this visit.          Cassi Claire MD  Resident  06/05/17  [id number]

## 2017-06-05 NOTE — DISCHARGE INSTRUCTIONS
Call clinic [phone] or L & D after hours at [phone] for vaginal bleeding, leakage of fluids, contractions 4-5 in one hour, decreased fetal movements (10 kicks in 2 hours), headache not relieved by Tylenol, blurry vision, or temp of 100.4 or greater.  Begin doing fetal kick counts, at least 10 movements in 2 hours starting at 28 weeks gestation.  Keep next clinic appointment.

## 2017-06-07 ENCOUNTER — HOSPITAL ENCOUNTER (OUTPATIENT)
Dept: PERINATAL CARE | Facility: OTHER | Age: 32
Discharge: HOME | End: 2017-06-07
Attending: OBSTETRICS & GYNECOLOGY
Payer: COMMERCIAL

## 2017-06-07 DIAGNOSIS — O13.3 GESTATIONAL HYPERTENSION, THIRD TRIMESTER: ICD-10-CM

## 2017-06-07 PROCEDURE — 76818 FETAL BIOPHYS PROFILE W/NST: CPT

## 2017-06-07 PROCEDURE — 76818 FETAL BIOPHYS PROFILE W/NST: CPT | Mod: 26,,, | Performed by: OBSTETRICS & GYNECOLOGY

## 2017-06-07 NOTE — PROGRESS NOTES
Indication  ========    NST, BPP .    History  ======    General History  Other: DR. Teran  Risk Factors  Details: gestationa HTN  Details: GERD  Details: Stomach ulcers    Maternal Assessment  =================    BP syst 140 mmHg  BP diast 91 mmHg  Heart rate 88 bpm  Physical Exam  Rt brachial A syst  mmHg  Rt brachial A diast BP 93 mmHg  Rt brachial A .0 mmHg    Method  ======    Transabdominal ultrasound examination, [place] S8. View: Sufficient.    Pregnancy  =========    [place] pregnancy. Number of fetuses: 1.    Dating  ======    LMP on: 9/21/2016  GA by LMP 37 w + 0 d  PRANAV by LMP: 6/28/2017  Assigned: The Best Overall Assessment is based on the LMP.  Assigned GA 37 w + 0 d  Assigned PRANAV: 6/28/2017    General Evaluation  ==============    Cardiac activity: present.  bpm.  Fetal movements: visualized.  Presentation: cephalic.  Placenta: anterior.  Umbilical cord: 3 vessel cord.    Non Stress Test  =============    NST interpretation: non-reactive. Test duration 51 min. Baseline  bpm. Baseline variability: moderate. Accelerations: present.  Decelerations: absent. Uterine activity: present. Acoustic stimulation: no  Pt reports + FM not feeling regular contractions. Not sure if ROM this AM or not. Had a gush of fluid x1 this am and is just feeling damp now.  Denies vag bleeding. Denies HA, blurred vision or epigastric pains. To L& D after monitoring and MVP Per DR. Gipson.    Amniotic Fluid Assessment  =====================    Amount of AF: normal amount  MVP 2.9 cm    Biophysical Profile  ==============    2: Fetal breathing movements  2: Gross body movements  2: Fetal tone  2: Amniotic fluid volume  2:NST  10/10: Biophysical profile score    Impression  =========    Non reactive but reassuring NST.  BPP 8/10.  Normal amniotic fluid volume.    Recommendation  ==============    Spoke to Dr. Gipson who is [facility] about patient with LOF this am x1 and increased B/P during monitoring.  Pt to [facility] for further evaluation.  Dr. Heathre aware that NST nonreactive but overall reassuring. Recommend repeating NST in the OB ED.  Recommend consideration of delivery given gestational age and gestational HTN.    ADDENUDUM: I inadvertently reviewed the OB ED tracing for PNT. In PNT, the tracing was reactive with BPP of 10/10.

## 2017-06-07 NOTE — ADDENDUM NOTE
Encounter addended by: Jasmin Dodd MD on: 6/7/2017  4:02 PM<BR>    Actions taken: Sign clinical note

## 2017-06-07 NOTE — ADDENDUM NOTE
Encounter addended by: Jasmin Dodd MD on: 6/7/2017  6:59 PM<BR>    Actions taken: Sign clinical note

## 2017-06-08 ENCOUNTER — HOSPITAL ENCOUNTER (INPATIENT)
Facility: OTHER | Age: 32
LOS: 2 days | Discharge: HOME | End: 2017-06-10
Attending: OBSTETRICS & GYNECOLOGY | Admitting: OBSTETRICS & GYNECOLOGY
Payer: COMMERCIAL

## 2017-06-08 ENCOUNTER — ANESTHESIA (OUTPATIENT)
Dept: OBSTETRICS AND GYNECOLOGY | Facility: OTHER | Age: 32
End: 2017-06-08
Payer: COMMERCIAL

## 2017-06-08 ENCOUNTER — ANESTHESIA EVENT (OUTPATIENT)
Dept: OBSTETRICS AND GYNECOLOGY | Facility: OTHER | Age: 32
End: 2017-06-08
Payer: COMMERCIAL

## 2017-06-08 DIAGNOSIS — O14.90 PRE-ECLAMPSIA: ICD-10-CM

## 2017-06-08 LAB
ABO + RH BLD: NORMAL
ALBUMIN SERPL BCP-MCNC: 3.6 G/DL
ALLENS TEST: ABNORMAL
ALP SERPL-CCNC: 49 U/L
ALT SERPL W/O P-5'-P-CCNC: 9 U/L
ANION GAP SERPL CALC-SCNC: 3 MMOL/L
ANISOCYTOSIS BLD QL SMEAR: SLIGHT
AST SERPL-CCNC: 15 U/L
BASOPHILS # BLD AUTO: 0.01 K/UL
BASOPHILS NFR BLD: 0.1 %
BILIRUB SERPL-MCNC: 0.8 MG/DL
BLD GP AB SCN CELLS X3 SERPL QL: NORMAL
BUN SERPL-MCNC: 13 MG/DL
CALCIUM SERPL-MCNC: 8.7 MG/DL
CHLORIDE SERPL-SCNC: 107 MMOL/L
CO2 SERPL-SCNC: 29 MMOL/L
CREAT SERPL-MCNC: 0.7 MG/DL
DIFFERENTIAL METHOD: ABNORMAL
EOSINOPHIL # BLD AUTO: 0.1 K/UL
EOSINOPHIL NFR BLD: 1.2 %
ERYTHROCYTE [DISTWIDTH] IN BLOOD BY AUTOMATED COUNT: 14.1 %
EST. GFR  (AFRICAN AMERICAN): >60 ML/MIN/1.73 M^2
EST. GFR  (NON AFRICAN AMERICAN): >60 ML/MIN/1.73 M^2
GLUCOSE SERPL-MCNC: 86 MG/DL
HCO3 UR-SCNC: 20.3 MMOL/L (ref 24–28)
HCT VFR BLD AUTO: 33.4 %
HGB BLD-MCNC: 10.8 G/DL
HYPOCHROMIA BLD QL SMEAR: ABNORMAL
LYMPHOCYTES # BLD AUTO: 1.2 K/UL
LYMPHOCYTES NFR BLD: 13.7 %
MCH RBC QN AUTO: 24.2 PG
MCHC RBC AUTO-ENTMCNC: 32.3 %
MCV RBC AUTO: 75 FL
MONOCYTES # BLD AUTO: 0.8 K/UL
MONOCYTES NFR BLD: 8.8 %
NEUTROPHILS # BLD AUTO: 6.5 K/UL
NEUTROPHILS NFR BLD: 76.2 %
OVALOCYTES BLD QL SMEAR: ABNORMAL
PCO2 BLDA: 54.8 MMHG (ref 35–45)
PH SMN: 7.18 [PH] (ref 7.35–7.45)
PLATELET # BLD AUTO: 223 K/UL
PLATELET BLD QL SMEAR: ABNORMAL
PMV BLD AUTO: 10.3 FL
PO2 BLDA: 18 MMHG (ref 80–100)
POC BE: -8 MMOL/L
POC SATURATED O2: 18 % (ref 95–100)
POIKILOCYTOSIS BLD QL SMEAR: SLIGHT
POLYCHROMASIA BLD QL SMEAR: ABNORMAL
POTASSIUM SERPL-SCNC: 4.1 MMOL/L
PROT SERPL-MCNC: 6.3 G/DL
RBC # BLD AUTO: 4.46 M/UL
SAMPLE: ABNORMAL
SITE: ABNORMAL
SODIUM SERPL-SCNC: 139 MMOL/L
WBC # BLD AUTO: 8.6 K/UL

## 2017-06-08 PROCEDURE — 51702 INSERT TEMP BLADDER CATH: CPT

## 2017-06-08 PROCEDURE — 72200006 HC VAGINAL DELIVERY LEVEL III

## 2017-06-08 PROCEDURE — 25000003 PHARM REV CODE 250: Performed by: OBSTETRICS & GYNECOLOGY

## 2017-06-08 PROCEDURE — 25000003 PHARM REV CODE 250: Performed by: STUDENT IN AN ORGANIZED HEALTH CARE EDUCATION/TRAINING PROGRAM

## 2017-06-08 PROCEDURE — 11000001 HC ACUTE MED/SURG PRIVATE ROOM

## 2017-06-08 PROCEDURE — 59400 OBSTETRICAL CARE: CPT | Mod: AT,,, | Performed by: OBSTETRICS & GYNECOLOGY

## 2017-06-08 PROCEDURE — 80053 COMPREHEN METABOLIC PANEL: CPT

## 2017-06-08 PROCEDURE — 86900 BLOOD TYPING SEROLOGIC ABO: CPT

## 2017-06-08 PROCEDURE — 27800517 HC TRAY,EPIDURAL-CONTINUOUS: Performed by: STUDENT IN AN ORGANIZED HEALTH CARE EDUCATION/TRAINING PROGRAM

## 2017-06-08 PROCEDURE — 63600175 PHARM REV CODE 636 W HCPCS: Performed by: STUDENT IN AN ORGANIZED HEALTH CARE EDUCATION/TRAINING PROGRAM

## 2017-06-08 PROCEDURE — 85025 COMPLETE CBC W/AUTO DIFF WBC: CPT

## 2017-06-08 PROCEDURE — 27200710 HC EPIDURAL INFUSION PUMP SET: Performed by: STUDENT IN AN ORGANIZED HEALTH CARE EDUCATION/TRAINING PROGRAM

## 2017-06-08 PROCEDURE — 86850 RBC ANTIBODY SCREEN: CPT

## 2017-06-08 PROCEDURE — 72100002 HC LABOR CARE, 1ST 8 HOURS

## 2017-06-08 PROCEDURE — 99900035 HC TECH TIME PER 15 MIN (STAT)

## 2017-06-08 PROCEDURE — 82803 BLOOD GASES ANY COMBINATION: CPT

## 2017-06-08 PROCEDURE — 62326 NJX INTERLAMINAR LMBR/SAC: CPT | Performed by: STUDENT IN AN ORGANIZED HEALTH CARE EDUCATION/TRAINING PROGRAM

## 2017-06-08 PROCEDURE — 59409 OBSTETRICAL CARE: CPT | Mod: QK,,, | Performed by: ANESTHESIOLOGY

## 2017-06-08 RX ORDER — DOCUSATE SODIUM 100 MG/1
200 CAPSULE, LIQUID FILLED ORAL 2 TIMES DAILY PRN
Status: DISCONTINUED | OUTPATIENT
Start: 2017-06-08 | End: 2017-06-10

## 2017-06-08 RX ORDER — ACETAMINOPHEN 325 MG/1
650 TABLET ORAL EVERY 6 HOURS PRN
Status: DISCONTINUED | OUTPATIENT
Start: 2017-06-08 | End: 2017-06-11 | Stop reason: HOSPADM

## 2017-06-08 RX ORDER — SODIUM CHLORIDE, SODIUM LACTATE, POTASSIUM CHLORIDE, CALCIUM CHLORIDE 600; 310; 30; 20 MG/100ML; MG/100ML; MG/100ML; MG/100ML
INJECTION, SOLUTION INTRAVENOUS CONTINUOUS
Status: DISCONTINUED | OUTPATIENT
Start: 2017-06-08 | End: 2017-06-08

## 2017-06-08 RX ORDER — FENTANYL/BUPIVACAINE/NS/PF 2MCG/ML-.1
PLASTIC BAG, INJECTION (ML) INJECTION CONTINUOUS PRN
Status: DISCONTINUED | OUTPATIENT
Start: 2017-06-08 | End: 2017-06-08

## 2017-06-08 RX ORDER — ZOLPIDEM TARTRATE 5 MG/1
5 TABLET ORAL NIGHTLY PRN
Status: DISCONTINUED | OUTPATIENT
Start: 2017-06-08 | End: 2017-06-11 | Stop reason: HOSPADM

## 2017-06-08 RX ORDER — ONDANSETRON 8 MG/1
8 TABLET, ORALLY DISINTEGRATING ORAL EVERY 8 HOURS PRN
Status: DISCONTINUED | OUTPATIENT
Start: 2017-06-08 | End: 2017-06-08

## 2017-06-08 RX ORDER — METOCLOPRAMIDE HYDROCHLORIDE 5 MG/ML
10 INJECTION INTRAMUSCULAR; INTRAVENOUS ONCE
Status: DISCONTINUED | OUTPATIENT
Start: 2017-06-08 | End: 2017-06-08

## 2017-06-08 RX ORDER — FENTANYL/BUPIVACAINE/NS/PF 2MCG/ML-.1
PLASTIC BAG, INJECTION (ML) INJECTION CONTINUOUS
Status: DISCONTINUED | OUTPATIENT
Start: 2017-06-08 | End: 2017-06-08

## 2017-06-08 RX ORDER — ONDANSETRON 8 MG/1
8 TABLET, ORALLY DISINTEGRATING ORAL EVERY 8 HOURS PRN
Status: DISCONTINUED | OUTPATIENT
Start: 2017-06-08 | End: 2017-06-11 | Stop reason: HOSPADM

## 2017-06-08 RX ORDER — SODIUM CHLORIDE, SODIUM LACTATE, POTASSIUM CHLORIDE, CALCIUM CHLORIDE 600; 310; 30; 20 MG/100ML; MG/100ML; MG/100ML; MG/100ML
INJECTION, SOLUTION INTRAVENOUS CONTINUOUS
Status: DISCONTINUED | OUTPATIENT
Start: 2017-06-08 | End: 2017-06-11 | Stop reason: HOSPADM

## 2017-06-08 RX ORDER — FENTANYL CITRATE 50 UG/ML
INJECTION, SOLUTION INTRAMUSCULAR; INTRAVENOUS
Status: DISPENSED
Start: 2017-06-08 | End: 2017-06-08

## 2017-06-08 RX ORDER — DIPHENHYDRAMINE HYDROCHLORIDE 50 MG/ML
25 INJECTION INTRAMUSCULAR; INTRAVENOUS EVERY 4 HOURS PRN
Status: DISCONTINUED | OUTPATIENT
Start: 2017-06-08 | End: 2017-06-11 | Stop reason: HOSPADM

## 2017-06-08 RX ORDER — SODIUM CHLORIDE, SODIUM LACTATE, POTASSIUM CHLORIDE, CALCIUM CHLORIDE 600; 310; 30; 20 MG/100ML; MG/100ML; MG/100ML; MG/100ML
1000 INJECTION, SOLUTION INTRAVENOUS CONTINUOUS
Status: DISCONTINUED | OUTPATIENT
Start: 2017-06-08 | End: 2017-06-08

## 2017-06-08 RX ORDER — OXYTOCIN/RINGER'S LACTATE 20/1000 ML
41.65 PLASTIC BAG, INJECTION (ML) INTRAVENOUS CONTINUOUS
Status: DISCONTINUED | OUTPATIENT
Start: 2017-06-08 | End: 2017-06-08

## 2017-06-08 RX ORDER — OXYCODONE AND ACETAMINOPHEN 10; 325 MG/1; MG/1
1 TABLET ORAL EVERY 4 HOURS PRN
Status: DISCONTINUED | OUTPATIENT
Start: 2017-06-08 | End: 2017-06-11 | Stop reason: HOSPADM

## 2017-06-08 RX ORDER — HYDROCORTISONE 25 MG/G
CREAM TOPICAL 3 TIMES DAILY PRN
Status: DISCONTINUED | OUTPATIENT
Start: 2017-06-08 | End: 2017-06-11 | Stop reason: HOSPADM

## 2017-06-08 RX ORDER — DIPHENHYDRAMINE HCL 25 MG
25 CAPSULE ORAL EVERY 4 HOURS PRN
Status: DISCONTINUED | OUTPATIENT
Start: 2017-06-08 | End: 2017-06-11 | Stop reason: HOSPADM

## 2017-06-08 RX ORDER — MISOPROSTOL 200 UG/1
TABLET ORAL
Status: DISCONTINUED
Start: 2017-06-08 | End: 2017-06-08 | Stop reason: WASHOUT

## 2017-06-08 RX ORDER — BUPIVACAINE HYDROCHLORIDE 2.5 MG/ML
INJECTION, SOLUTION EPIDURAL; INFILTRATION; INTRACAUDAL
Status: DISPENSED
Start: 2017-06-08 | End: 2017-06-08

## 2017-06-08 RX ORDER — FAMOTIDINE 10 MG/ML
20 INJECTION INTRAVENOUS ONCE
Status: DISCONTINUED | OUTPATIENT
Start: 2017-06-08 | End: 2017-06-08

## 2017-06-08 RX ORDER — MAGNESIUM SULFATE HEPTAHYDRATE 40 MG/ML
2 INJECTION, SOLUTION INTRAVENOUS ONCE
Status: COMPLETED | OUTPATIENT
Start: 2017-06-08 | End: 2017-06-08

## 2017-06-08 RX ORDER — CARBOPROST TROMETHAMINE 250 UG/ML
INJECTION, SOLUTION INTRAMUSCULAR
Status: DISCONTINUED
Start: 2017-06-08 | End: 2017-06-08 | Stop reason: WASHOUT

## 2017-06-08 RX ORDER — SODIUM CITRATE AND CITRIC ACID MONOHYDRATE 334; 500 MG/5ML; MG/5ML
30 SOLUTION ORAL ONCE
Status: DISCONTINUED | OUTPATIENT
Start: 2017-06-08 | End: 2017-06-08

## 2017-06-08 RX ORDER — OXYTOCIN/RINGER'S LACTATE 20/1000 ML
2 PLASTIC BAG, INJECTION (ML) INTRAVENOUS CONTINUOUS
Status: DISCONTINUED | OUTPATIENT
Start: 2017-06-08 | End: 2017-06-08

## 2017-06-08 RX ORDER — MAGNESIUM SULFATE HEPTAHYDRATE 40 MG/ML
2 INJECTION, SOLUTION INTRAVENOUS CONTINUOUS
Status: DISCONTINUED | OUTPATIENT
Start: 2017-06-08 | End: 2017-06-08

## 2017-06-08 RX ORDER — LIDOCAINE HYDROCHLORIDE AND EPINEPHRINE 15; 5 MG/ML; UG/ML
INJECTION, SOLUTION EPIDURAL
Status: DISCONTINUED | OUTPATIENT
Start: 2017-06-08 | End: 2017-06-08

## 2017-06-08 RX ORDER — FENTANYL/BUPIVACAINE/NS/PF 2MCG/ML-.1
PLASTIC BAG, INJECTION (ML) INJECTION
Status: DISPENSED
Start: 2017-06-08 | End: 2017-06-08

## 2017-06-08 RX ORDER — METHYLERGONOVINE MALEATE 0.2 MG/ML
INJECTION INTRAVENOUS
Status: DISCONTINUED
Start: 2017-06-08 | End: 2017-06-08 | Stop reason: WASHOUT

## 2017-06-08 RX ORDER — FENTANYL CITRATE 50 UG/ML
INJECTION, SOLUTION INTRAMUSCULAR; INTRAVENOUS
Status: DISCONTINUED | OUTPATIENT
Start: 2017-06-08 | End: 2017-06-08

## 2017-06-08 RX ORDER — CALCIUM GLUCONATE 98 MG/ML
1 INJECTION, SOLUTION INTRAVENOUS
Status: DISCONTINUED | OUTPATIENT
Start: 2017-06-08 | End: 2017-06-08

## 2017-06-08 RX ORDER — OXYCODONE AND ACETAMINOPHEN 5; 325 MG/1; MG/1
1 TABLET ORAL EVERY 4 HOURS PRN
Status: DISCONTINUED | OUTPATIENT
Start: 2017-06-08 | End: 2017-06-11 | Stop reason: HOSPADM

## 2017-06-08 RX ORDER — IBUPROFEN 600 MG/1
600 TABLET ORAL EVERY 6 HOURS
Status: DISCONTINUED | OUTPATIENT
Start: 2017-06-08 | End: 2017-06-08

## 2017-06-08 RX ADMIN — FENTANYL CITRATE 100 MCG: 50 INJECTION, SOLUTION INTRAMUSCULAR; INTRAVENOUS at 08:06

## 2017-06-08 RX ADMIN — MAGNESIUM SULFATE HEPTAHYDRATE 2 G/HR: 40 INJECTION, SOLUTION INTRAVENOUS at 10:06

## 2017-06-08 RX ADMIN — Medication 10 ML: at 08:06

## 2017-06-08 RX ADMIN — SODIUM CHLORIDE, SODIUM LACTATE, POTASSIUM CHLORIDE, AND CALCIUM CHLORIDE 125 ML/HR: .6; .31; .03; .02 INJECTION, SOLUTION INTRAVENOUS at 06:06

## 2017-06-08 RX ADMIN — Medication 2 MILLI-UNITS/MIN: at 06:06

## 2017-06-08 RX ADMIN — Medication 10 ML/HR: at 08:06

## 2017-06-08 RX ADMIN — OXYCODONE HYDROCHLORIDE AND ACETAMINOPHEN 1 TABLET: 5; 325 TABLET ORAL at 08:06

## 2017-06-08 RX ADMIN — MAGNESIUM SULFATE HEPTAHYDRATE 2 G: 40 INJECTION, SOLUTION INTRAVENOUS at 09:06

## 2017-06-08 RX ADMIN — DOCUSATE SODIUM 200 MG: 100 CAPSULE, LIQUID FILLED ORAL at 08:06

## 2017-06-08 RX ADMIN — Medication 6 ML: at 09:06

## 2017-06-08 RX ADMIN — LIDOCAINE HYDROCHLORIDE AND EPINEPHRINE 3 ML: 15; 5 INJECTION, SOLUTION EPIDURAL at 08:06

## 2017-06-08 RX ADMIN — MAGNESIUM SULFATE HEPTAHYDRATE 2 G: 40 INJECTION, SOLUTION INTRAVENOUS at 10:06

## 2017-06-08 NOTE — ANESTHESIA PREPROCEDURE EVALUATION
2017    Sarah Lee is a 31 y.o. female  at 37w1d with PMHx significant for GI bleed and previous preE during pregnancy who presents for IOL 2/2 gestational hypertension.      OB History    Para Term  AB Living   3 1  1 1 1   SAB TAB Ectopic Multiple Live Births    1   1      # Outcome Date GA Lbr Alin/2nd Weight Sex Delivery Anes PTL Lv   3 Current            2  / 36w6d 10:00 / 00:31 2.478 kg (5 lb 7.4 oz) M Vag-Spont EPI Y WILLIAM      Complications: Pre-eclampsia   1 TAB  7w0d             Birth Comments: D&E          Wt Readings from Last 1 Encounters:   17 1314 73.9 kg (162 lb 14.7 oz)       BP Readings from Last 3 Encounters:   17 138/87   17 135/87   17 (!) 140/70       Patient Active Problem List   Diagnosis    GI bleed    Upper GI bleed    Gastric ulcer with hemorrhage    Hyperglycemia    Mild protein malnutrition    Acute blood loss anemia    Elevated blood pressure reading without diagnosis of hypertension    Hx of preeclampsia, prior pregnancy, currently pregnant    Pregnancy with one fetus    Gestational hypertension       History reviewed. No pertinent surgical history.    Social History     Social History    Marital status: Single     Spouse name: N/A    Number of children: N/A    Years of education: N/A     Occupational History    Not on file.     Social History Main Topics    Smoking status: Never Smoker    Smokeless tobacco: Never Used    Alcohol use No    Drug use: No    Sexual activity: Yes     Partners: Male     Birth control/ protection: None, Condom     Other Topics Concern    Not on file     Social History Narrative    No narrative on file         Chemistry        Component Value Date/Time     2017 1430    K 4.3 2017 1430     2017 1430    CO2 21 (L) 2017 1430    BUN 8  06/05/2017 1430    CREATININE 0.7 06/05/2017 1430    GLU 70 06/05/2017 1430        Component Value Date/Time    CALCIUM 8.8 06/05/2017 1430    ALKPHOS 170 (H) 06/05/2017 1430    AST 17 06/05/2017 1430    ALT 9 (L) 06/05/2017 1430    BILITOT 0.6 06/05/2017 1430            Lab Results   Component Value Date    WBC 8.17 06/05/2017    HGB 11.2 (L) 06/05/2017    HCT 33.7 (L) 06/05/2017    MCV 75 (L) 06/05/2017     06/05/2017       No results for input(s): INR, PROTIME, APTT in the last 72 hours.    Invalid input(s): PT        Anesthesia Evaluation    I have reviewed the Patient Summary Reports.    I have reviewed the Nursing Notes.   I have reviewed the Medications.     Review of Systems  Anesthesia Hx:  Denies Family Hx of Anesthesia complications.   Denies Personal Hx of Anesthesia complications.   EENT/Dental:EENT/Dental Normal   Cardiovascular:   Hypertension (gestational)    Pulmonary:  Pulmonary Normal    Hepatic/GI:   PUD, GERD, well controlled    Musculoskeletal:  Musculoskeletal Normal    OB/GYN/PEDS:  Hx of prior pregnancy complicated by preE.   Endocrine:  Endocrine Normal    Psych:  Psychiatric Normal           Physical Exam  General:  Well nourished    Airway/Jaw/Neck:  Airway Findings: Mouth Opening: Normal Tongue: Normal  General Airway Assessment: Adult  Mallampati: III  Improves to II with phonation.  TM Distance: Normal, at least 6 cm  Jaw/Neck Findings:  Neck ROM: Normal ROM      Dental:  Dental Findings: In tact   Chest/Lungs:  Chest/Lungs Findings: Clear to auscultation, Normal Respiratory Rate     Heart/Vascular:  Heart Findings: Rate: Normal  Rhythm: Regular Rhythm        Mental Status:  Mental Status Findings:  Cooperative, Alert and Oriented         Anesthesia Plan  Type of Anesthesia, risks & benefits discussed:  Anesthesia Type:  CSE, epidural, general, spinal  Patient's Preference:   Intra-op Monitoring Plan:   Intra-op Monitoring Plan Comments:   Post Op Pain Control Plan: multimodal  analgesia  Post Op Pain Control Plan Comments:   Induction:    Beta Blocker:  Patient is not currently on a Beta-Blocker (No further documentation required).       Informed Consent: Patient understands risks and agrees with Anesthesia plan.  Questions answered. Anesthesia consent signed with patient.  ASA Score: 2     Day of Surgery Review of History & Physical:    H&P update referred to the provider.         Ready For Surgery From Anesthesia Perspective.

## 2017-06-08 NOTE — L&D DELIVERY NOTE
FAVD cephalic after approximately 30 minutes of maternal pushing  Fetal station at +2 with pronounced caput. Fetal position established by palpation of both anterior and posterior fontanelles. Luikart Enciso forceps called for as fetal heart rate decreased from baseline in the 100-110 for 6 minutes with occasional dips into 80 BPM. Luikart Simpsons applied and position reconfirmed. Constant downward traction applied with natural cardinal movements with uterine contractions. At +3 the forceps were removed. Fetal head delivered and externally rotated to LOT with a left shoulder dystocia. The shoulder dystocia lasted 15 seconds and relieved with Mackenzie, suprapubic pressure and woodscrew.  Delivery occurred under epidural anesthesia.  Infant handed to  for evaluation and resuscitation.   Umbilical arterial gas and venous blood obtained.  Placenta delivered spontaneously and IV pitocin given.  Uterine tone noted. No cervical or vaginal lacerations.  Patient tolerated delivery well.   cc  Staff present for entire procedure.  S/L/N counts correct x2.         Delivery Information for  Singh Lee    Birth information:  YOB: 2018   Time of birth: 12:42 PM   Sex: male   Head Delivery Date/Time:     Delivery type:    Gestational Age: 37w1d              [place] Assessment    No data filed                          Interventions/Resuscitation         Cord    No data filed              Labor Events:       labor:       Labor Onset Date/Time:         Dilation Complete Date/Time:         Start Pushing Date/Time:       Rupture Date/Time:              Rupture type:           Fluid Amount:        Fluid Color:        Fluid Odor:        Membrane Status (PeriCalm):        Rupture Date/Time (PeriCalm):        Fluid Amount (PeriCalm):        Fluid Color (PeriCalm):         steroids:       Antibiotics given for GBS:       Induction:       Indications for induction:        Augmentation:        Indications for augmentation:       Labor complications:       Additional complications:          Cervical ripening:                     Delivery:      Episiotomy:       Indication for Episiotomy:       Perineal Lacerations:   Repaired:      Periurethral Laceration:   Repaired:     Labial Laceration:   Repaired:     Sulcus Laceration:   Repaired:     Vaginal Laceration:   Repaired:     Cervical Laceration:   Repaired:     Repair suture:       Repair # of packets:       Vaginal delivery QBL (mL):        QBL (mL): 0     Combined Blood Loss (mL): 0     Vaginal Sweep Performed:       Surgicount Correct:         Other providers:            Details (if applicable):  Trial of Labor      Categorization:      Priority:     Indications for :     Incision Type:       Additional  information:  Forceps:    Vacuum:    Breech:    Observed anomalies    Other (Comments):

## 2017-06-08 NOTE — ANESTHESIA PROCEDURE NOTES
Epidural    Patient location during procedure: OB   Reason for block: primary anesthetic   Diagnosis: IUP   Start time: 6/8/2017 8:17 AM  Timeout: 6/8/2017 8:17 AM  End time: 6/8/2017 8:30 AM  Surgery related to: Vaginal Delivery  Staffing  Anesthesiologist: SUZETTE TIJERINA  Resident/CRNA: KALEB THOMAS  Other anesthesia staff: RAJEEV ESCOBEDO  Performed: resident/CRNA   Preanesthetic Checklist  Completed: patient identified, site marked, surgical consent, pre-op evaluation, timeout performed, IV checked, risks and benefits discussed, monitors and equipment checked, anesthesia consent given, hand hygiene performed and patient being monitored  Preparation  Patient position: sitting  Prep: ChloraPrep  Patient monitoring: Pulse Ox  Epidural  Skin Anesthetic: lidocaine 1%  Skin Wheal: 3 mL  Administration type: continuous  Approach: midline  Interspace: L3-4  Injection technique: UMM saline  Needle and Epidural Catheter  Needle type: Tuohy   Needle gauge: 17  Needle length: 3.5 inches  Needle insertion depth: 4 cm  Catheter type: springwound and multi-orifice  Catheter size: 19 G  Catheter at skin depth: 8 cm  Test dose: 3 mL of lidocaine 1.5% with Epi 1-to-200,000  Additional Documentation: incremental injection, negative aspiration for heme and CSF, no paresthesia on injection, no signs/symptoms of IV or SA injection, no significant pain on injection and no significant complaints from patient  Needle localization: anatomical landmarks  Medications:  Bolus administered: 10 mL of 0.125% bupivacaine  Opioid administered: 100 mcg of   fentanyl  Volume per aspiration: 5 mL  Time between aspirations: 5 minutes  Assessment  Upper dermatomal levels - Left: T12  Right: T10  Ease of block: easy  Patient's tolerance of the procedure: comfortable throughout block and no complaints

## 2017-06-08 NOTE — PROGRESS NOTES
LABOR PROGRESS NOTE    S:  MD to bedside for cervical check. Complaints: No.     O: Temp:  [98.4 °F (36.9 °C)] 98.4 °F (36.9 °C)  Pulse:  [88] 88  Resp:  [18] 18  SpO2:  [99 %] 99 %  BP: (138)/(87) 138/87      FHT: 125BPM/mod beat to beat variability/-accels/-decels Cat 1 (reassuring)  CTX: q 1-2 minutes  SVE: /-2        ASSESSMENT:   31 y.o.  at 37w1d, IOL secondary to Pre E w/o SF >37 weeks    FHT reassuring    Active Hospital Problems    Diagnosis  POA    Pre-eclampsia [O14.90]  Yes    Pregnancy with one fetus [Z34.90]  Not Applicable      Resolved Hospital Problems    Diagnosis Date Resolved POA   No resolved problems to display.         PLAN:    Labor management  Continue Close Maternal/Fetal Monitoring.   Continue pitocin Augmentation per protocol  Recheck 2 hours or PRN    Ronald Mora MD

## 2017-06-08 NOTE — H&P
HISTORY AND PHYSICAL                                                OBSTETRICS          Subjective:       Sarah Lee is a 31 y.o.  female with IUP at 37w1d weeks gestation who is admitted for induction of labor for pre-eclampsia without SF. Pt denies any HA, visual changes, SOB, CP, N/V, or RUQ pain at this time.      This IUP is complicated by Pre-e without SF, GERD, PUD.  Patient denies contractions, denies vaginal bleeding, denies LOF.   Fetal Movement: normal.     PMHx:   Past Medical History:   Diagnosis Date    GERD (gastroesophageal reflux disease)     Gestational hypertension     History of bleeding ulcers     upper stomach    Ovarian cyst, left     Stomach ulcer     Tooth abscess     left lower tooth abcess    Ulcer        PSHx: History reviewed. No pertinent surgical history.    All: Review of patient's allergies indicates:  No Known Allergies    Meds:   Prescriptions Prior to Admission   Medication Sig Dispense Refill Last Dose    PRENATAL VIT #91/FE FUM/FA/DHA (PRENATAL + DHA ORAL) Take by mouth.   Taking       SH:   Social History     Social History    Marital status: Single     Spouse name: N/A    Number of children: N/A    Years of education: N/A     Occupational History    Not on file.     Social History Main Topics    Smoking status: Never Smoker    Smokeless tobacco: Never Used    Alcohol use No    Drug use: No    Sexual activity: Yes     Partners: Male     Birth control/ protection: None, Condom     Other Topics Concern    Not on file     Social History Narrative    No narrative on file       FH:   Family History   Problem Relation Age of Onset    Diabetes Paternal Grandmother     Hypertension Paternal Grandmother     Diabetes Maternal Grandmother     Hypertension Maternal Grandmother     Diabetes Mother     Hypertension Mother     Pancreatic cancer Mother 55    Coronary artery disease Father 50      of an MI age 61    Breast cancer Neg Hx      Colon cancer Neg Hx     Ovarian cancer Neg Hx        OBHx:   Obstetric History       T0      L1     SAB1   TAB0   Ectopic0   Multiple0   Live Births1       # Outcome Date GA Lbr Alin/2nd Weight Sex Delivery Anes PTL Lv   3 Current            2  12 36w6d 10:00 / 00:31 2.478 kg (5 lb 7.4 oz) M Vag-Spont EPI Y WILLIAM      Name: Legend      Complications: Pre-eclampsia      Apgar1:  8                Apgar5: 9   1 TAB  7w0d                 Objective:       LMP 2016 (Exact Date)   Breastfeeding? No     There were no vitals filed for this visit.    General:   alert, appears stated age and cooperative   Lungs:   clear to auscultation bilaterally   Heart:   regular rate and rhythm, S1, S2 normal, no murmur, click, rub or gallop   Abdomen:  soft, non-tender; bowel sounds normal; no masses,  no organomegaly   Extremities negative edema, negative erythema   FHT:  Cat 1 (reassuring)                 TOCO: none   Presentations: cephalic by ultrasound   Cervix:     Dilation: 3cm    Effacement: 70%    Station:  -3    Consistency: medium    Position: middle       EFW by Leopold's: 7.5#    Lab Review  Blood Type O POS  GBBS: unk  Rubella: Immune  RPR: nr  HIV: negative  HepB: negative       Assessment:       37w1d weeks gestation admitted for IOL for pre-e w/o SF    Active Hospital Problems    Diagnosis  POA    Pre-eclampsia [O14.90]  Yes    Pregnancy with one fetus [Z34.90]  Not Applicable      Resolved Hospital Problems    Diagnosis Date Resolved POA   No resolved problems to display.          Plan:      Risks, benefits, alternatives and possible complications have been discussed in detail with the patient.     IOL at 37w1d   - Consents signed and to chart  - Admit to Labor and Delivery unit  - Plan for pitocin- and amniotomy   - Epidural per Anesthesia  - Draw CBC, T&S  - Notify Staff  - Recheck in  hrs or PRN  - Post-Partum Hemorrhage risk - low    Pre e without SF  - Denies pre-e symptoms  -  Recheck Repeat CBC/CMP this AM  - Mag Sulfate for sezire ppx if develops SF     Tran Duvall MD PGY-3   Ob-Gyn Resident   # [phone]

## 2017-06-09 LAB
ANISOCYTOSIS BLD QL SMEAR: SLIGHT
BASOPHILS # BLD AUTO: 0.02 K/UL
BASOPHILS NFR BLD: 0.1 %
BURR CELLS BLD QL SMEAR: ABNORMAL
DACRYOCYTES BLD QL SMEAR: ABNORMAL
DIFFERENTIAL METHOD: ABNORMAL
EOSINOPHIL # BLD AUTO: 0.1 K/UL
EOSINOPHIL NFR BLD: 0.7 %
ERYTHROCYTE [DISTWIDTH] IN BLOOD BY AUTOMATED COUNT: 14.4 %
HCT VFR BLD AUTO: 29.9 %
HGB BLD-MCNC: 9.8 G/DL
HYPOCHROMIA BLD QL SMEAR: ABNORMAL
LYMPHOCYTES # BLD AUTO: 1.5 K/UL
LYMPHOCYTES NFR BLD: 10.6 %
MCH RBC QN AUTO: 24.4 PG
MCHC RBC AUTO-ENTMCNC: 32.8 %
MCV RBC AUTO: 75 FL
MONOCYTES # BLD AUTO: 1.3 K/UL
MONOCYTES NFR BLD: 9.4 %
NEUTROPHILS # BLD AUTO: 10.9 K/UL
NEUTROPHILS NFR BLD: 79.2 %
OVALOCYTES BLD QL SMEAR: ABNORMAL
PLATELET # BLD AUTO: 212 K/UL
PLATELET BLD QL SMEAR: ABNORMAL
PMV BLD AUTO: 10 FL
POIKILOCYTOSIS BLD QL SMEAR: SLIGHT
POLYCHROMASIA BLD QL SMEAR: ABNORMAL
RBC # BLD AUTO: 4.01 M/UL
WBC # BLD AUTO: 13.77 K/UL

## 2017-06-09 PROCEDURE — 85025 COMPLETE CBC W/AUTO DIFF WBC: CPT

## 2017-06-09 PROCEDURE — 11000001 HC ACUTE MED/SURG PRIVATE ROOM

## 2017-06-09 PROCEDURE — 25000003 PHARM REV CODE 250: Performed by: STUDENT IN AN ORGANIZED HEALTH CARE EDUCATION/TRAINING PROGRAM

## 2017-06-09 PROCEDURE — 36415 COLL VENOUS BLD VENIPUNCTURE: CPT

## 2017-06-09 RX ORDER — OXYCODONE AND ACETAMINOPHEN 5; 325 MG/1; MG/1
1 TABLET ORAL EVERY 4 HOURS PRN
Qty: 10 TABLET | Refills: 0 | Status: SHIPPED | OUTPATIENT
Start: 2017-06-09 | End: 2017-07-12

## 2017-06-09 RX ORDER — MAGNESIUM SULFATE HEPTAHYDRATE 40 MG/ML
2 INJECTION, SOLUTION INTRAVENOUS ONCE
Status: DISCONTINUED | OUTPATIENT
Start: 2017-06-09 | End: 2017-06-09

## 2017-06-09 RX ORDER — SODIUM CHLORIDE, SODIUM LACTATE, POTASSIUM CHLORIDE, CALCIUM CHLORIDE 600; 310; 30; 20 MG/100ML; MG/100ML; MG/100ML; MG/100ML
1000 INJECTION, SOLUTION INTRAVENOUS CONTINUOUS
Status: DISCONTINUED | OUTPATIENT
Start: 2017-06-09 | End: 2017-06-11 | Stop reason: HOSPADM

## 2017-06-09 RX ORDER — CALCIUM GLUCONATE 98 MG/ML
1 INJECTION, SOLUTION INTRAVENOUS
Status: DISCONTINUED | OUTPATIENT
Start: 2017-06-09 | End: 2017-06-11 | Stop reason: HOSPADM

## 2017-06-09 RX ADMIN — OXYCODONE HYDROCHLORIDE AND ACETAMINOPHEN 1 TABLET: 5; 325 TABLET ORAL at 09:06

## 2017-06-09 NOTE — ANESTHESIA POSTPROCEDURE EVALUATION
"Anesthesia Post Evaluation    Patient: Sarah Lee    Procedure(s) Performed: * No procedures listed *    Final Anesthesia Type: epidural  Patient location during evaluation: PACU  Patient participation: Yes- Able to Participate  Level of consciousness: awake and alert and oriented  Post-procedure vital signs: reviewed and stable  Pain management: adequate  Airway patency: patent  PONV status at discharge: No PONV  Anesthetic complications: no      Cardiovascular status: blood pressure returned to baseline and hemodynamically stable  Respiratory status: spontaneous ventilation, unassisted and room air  Hydration status: euvolemic  Follow-up not needed.        Visit Vitals  BP (!) 141/89   Pulse 75   Temp 36.6 °C (97.9 °F) (Oral)   Resp 16   Ht 5' 4" (1.626 m)   Wt 73.9 kg (162 lb 14.7 oz)   LMP 09/21/2016 (Exact Date)   SpO2 100%   Breastfeeding? Unknown   BMI 27.97 kg/m²       Pain/Richard Score: Pain Rating Prior to Med Admin: 5 (6/9/2017  9:18 AM)  Pain Rating Post Med Admin: 1 (6/9/2017  9:42 AM)      "

## 2017-06-09 NOTE — SUBJECTIVE & OBJECTIVE
Hospital course: No notes on file   Ms Stanley is PPD 1 s/p FAVD with mild dystocia resolved with suprapubic and ashly maneuvers in 15 seconds.  Patient doing well, voiding spontaneously, ambulating without difficulty, vaginal bleeding stable and decreasing, pain well controlled. Patient is breastfeeding. She desires circumcision for her male baby: yes.    Patient is bottle feeding. She desires circumcision for her male baby: yes.    Objective:     Vital Signs (Most Recent):  Temp: 98.3 °F (36.8 °C) (06/09/17 0445)  Pulse: 83 (06/09/17 0445)  Resp: 18 (06/09/17 0445)  BP: (!) 150/82 (nurse notified) (06/09/17 0445)  SpO2: 100 % (06/09/17 0445) Vital Signs (24h Range):  Temp:  [97 °F (36.1 °C)-98.3 °F (36.8 °C)] 98.3 °F (36.8 °C)  Pulse:  [] 83  Resp:  [17-18] 18  SpO2:  [92 %-100 %] 100 %  BP: (118-181)/() 150/82     Weight: 73.9 kg (162 lb 14.7 oz)  Body mass index is 27.97 kg/m².      Intake/Output Summary (Last 24 hours) at 06/09/17 0625  Last data filed at 06/09/17 0100   Gross per 24 hour   Intake            527.5 ml   Output             3320 ml   Net          -2792.5 ml       Significant Labs:  Lab Results   Component Value Date    GROUPTRH O POS 06/08/2017    HEPBSAG Negative 11/10/2016    STREPBCULT NO BETA HEMOLYTIC STREPTOCOCCUS FOUND 11/23/2012       Recent Labs  Lab 06/08/17  0707   HGB 10.8*   HCT 33.4*       I have personallly reviewed all pertinent lab results from the last 24 hours.    Physical Exam:   Constitutional: She appears well-developed and well-nourished. No distress.       Cardiovascular: Normal rate and regular rhythm.     Pulmonary/Chest: Effort normal.        Abdominal: Soft. She exhibits no distension and no abdominal incision. There is no tenderness.     Genitourinary: Vagina normal.                Skin: She is not diaphoretic.

## 2017-06-09 NOTE — ASSESSMENT & PLAN NOTE
- s/p magnesium sulfate (short protocol)  - no HA, vision changes, difficulty breathing, chest pain, RUQ pain or new leg edema  - Continue to clinically monitor  - BP: (118-181)/() 150/82 - vitals q4

## 2017-06-09 NOTE — PROGRESS NOTES
[facility]  Obstetrics  Postpartum Progress Note    Patient Name: Sarah Lee  MRN: [id number]  Admission Date: 6/8/2017  Hospital Length of Stay: 1 days  Attending Physician: Vicki Teran MD  Primary Care Provider: Tavia Daley MD    Subjective:     Principal Problem:Pre-eclampsia    Hospital course: No notes on file   Ms Lee is PPD 1 s/p FAVD with mild dystocia resolved with suprapubic and ashly maneuvers in 15 seconds.  Patient doing well, voiding spontaneously, ambulating without difficulty, vaginal bleeding stable and decreasing, pain well controlled. Patient is breastfeeding. She desires circumcision for her male baby: yes.    Objective:     Vital Signs (Most Recent):  Temp: 98.3 °F (36.8 °C) (06/09/17 0445)  Pulse: 83 (06/09/17 0445)  Resp: 18 (06/09/17 0445)  BP: (!) 150/82 (nurse notified) (06/09/17 0445)  SpO2: 100 % (06/09/17 0445) Vital Signs (24h Range):  Temp:  [97 °F (36.1 °C)-98.3 °F (36.8 °C)] 98.3 °F (36.8 °C)  Pulse:  [] 83  Resp:  [17-18] 18  SpO2:  [92 %-100 %] 100 %  BP: (118-181)/() 150/82     Weight: 73.9 kg (162 lb 14.7 oz)  Body mass index is 27.97 kg/m².      Intake/Output Summary (Last 24 hours) at 06/09/17 0625  Last data filed at 06/09/17 0100   Gross per 24 hour   Intake            527.5 ml   Output             3320 ml   Net          -2792.5 ml       Significant Labs:  Lab Results   Component Value Date    GROUPTRH O POS 06/08/2017    HEPBSAG Negative 11/10/2016    STREPBCULT NO BETA HEMOLYTIC STREPTOCOCCUS FOUND 11/23/2012       Recent Labs  Lab 06/08/17  0707   HGB 10.8*   HCT 33.4*       I have personallly reviewed all pertinent lab results from the last 24 hours.    Physical Exam:   Constitutional: She appears well-developed and well-nourished. No distress.       Cardiovascular: Normal rate and regular rhythm.     Pulmonary/Chest: Effort normal.        Abdominal: Soft. She exhibits no distension and no abdominal incision.  There is no tenderness.     Genitourinary: Vagina normal.                Skin: She is not diaphoretic.        Assessment/Plan:     31 y.o. female  for:     (spontaneous vaginal delivery)    - Continue routine post partum advances  - Diet: regular  - Lactation: breast  - Contraception: discuss outpatient  - Encourage ambulation  - Pain well controlled on PO analgesics. Continue to monitor pain scale  - VSS. Wnl. Vitals q4  ABLA        * Pre-eclampsia    - s/p magnesium sulfate (short protocol); MILDLY ELEVATED BP THIS AM, WILL MONITOR  - no HA, vision changes, difficulty breathing, chest pain, RUQ pain or new leg edema  - Continue to clinically monitor  - BP: (118-181)/() 150/82 - vitals q4            Disposition: As patient meets milestones, will plan to discharge tomorrow.    Ronald Mora MD  Obstetrics  [facility]    I have personally taken the history and have examined this patient, and I agree with the resident's note as stated above.

## 2017-06-09 NOTE — PROGRESS NOTES
"MAG NOTE     Sarah Lee is a 31 y.o. female PPD#0 s/p  on magnesium for sz PPX, pre-eclampsia w/SF (BP)    Patient denies headaches,  blurry vision and  right upper quadrant pain. denies CP, denies SOB. Patient reports no nausea/no vomiting.     Objective:       BP (!) 141/84   Pulse (!) 111   Temp 98.2 °F (36.8 °C) (Oral)   Resp 18   Ht 5' 4" (1.626 m)   Wt 73.9 kg (162 lb 14.7 oz)   LMP 2016 (Exact Date)   SpO2 99%   Breastfeeding? Unknown   BMI 27.97 kg/m²       General:   alert, appears stated age and cooperative   Lungs:   clear to auscultation bilaterally   Heart:   regular rate and rhythm, S1, S2 normal, no murmur, click, rub or gallop   Abdomen:  soft, non-tender; bowel sounds normal; no masses,  no organomegaly   Uterine Size:  medium located at the umblicus.    Extremities: peripheral pulses normal, no pedal edema, no clubbing or cyanosis   Reflexes - 2+  bilaterally brachioradials            Assessment:     31 y.o.  female , on magnesium sulfate    Active Hospital Problems    Diagnosis  POA    *Pre-eclampsia [O14.90]  Yes     (spontaneous vaginal delivery) [O80]  Not Applicable    Pregnancy with one fetus [Z34.90]  Not Applicable      Resolved Hospital Problems    Diagnosis Date Resolved POA   No resolved problems to display.        Plan:   1. Continue magnesium sulfate, no signs of toxicity at this time  2. Monitor for s/s of eclampsia  3. Close maternal monitoring including UOP and BP   - BP: (123-181)/() 141/84   - Urine output ~175cc/hr x3 hours  4. Recheck in 4 hours    Stephan Dodd MD   PGY-2 Ob-gyn    "

## 2017-06-09 NOTE — ASSESSMENT & PLAN NOTE
- Continue routine post partum advances  - Diet: regular  - Lactation: breast  - Contraception: discuss outpatient  - Encourage ambulation  - Pain well controlled on PO analgesics. Continue to monitor pain scale  - VSS. Wnl. Vitals q4

## 2017-06-09 NOTE — PROGRESS NOTES
"MAG NOTE     Sarah Lee is a 31 y.o. female PPD#1 s/p  on magnesium for sz PPX, pre-eclampsia w/SF (BP)    Patient denies headaches,  blurry vision and  right upper quadrant pain. denies CP, denies SOB. Patient reports no nausea/no vomiting.     Objective:       BP (!) 150/82 (BP Location: Right arm, Patient Position: Lying, BP Method: Automatic) Comment: nurse notified  Pulse 83   Temp 98.3 °F (36.8 °C) (Oral)   Resp 18   Ht 5' 4" (1.626 m)   Wt 73.9 kg (162 lb 14.7 oz)   LMP 2016 (Exact Date)   SpO2 100%   Breastfeeding? Unknown   BMI 27.97 kg/m²       General:   alert, appears stated age and cooperative   Lungs:   clear to auscultation bilaterally   Heart:   regular rate and rhythm, S1, S2 normal, no murmur, click, rub or gallop   Abdomen:  soft, non-tender; bowel sounds normal; no masses,  no organomegaly   Uterine Size:  medium located at the umblicus.    Extremities: peripheral pulses normal, no pedal edema, no clubbing or cyanosis   Reflexes - 2+  bilaterally brachioradials            Assessment:     31 y.o.  female , on magnesium sulfate    Active Hospital Problems    Diagnosis  POA    *Pre-eclampsia [O14.90]  Yes     (spontaneous vaginal delivery) [O80]  Not Applicable    Pregnancy with one fetus [Z34.90]  Not Applicable      Resolved Hospital Problems    Diagnosis Date Resolved POA   No resolved problems to display.        Plan:   1. Continue magnesium sulfate, no signs of toxicity at this time  2. Monitor for s/s of eclampsia, curently asymptomatic  3. Close maternal monitoring including UOP and BP   - BP: (118-181)/() 150/82, >50% of blood pressures <150/100   - Urine output 250cc x3 hours   - OK for short protocol    Stephan Dodd MD   PGY-2 Ob-gyn    "

## 2017-06-09 NOTE — PROGRESS NOTES
"MAG NOTE     Sarah Lee is a 31 y.o. female PPD#0 s/p  on magnesium for sz PPX, pre-eclampsia w/SF (BP)    Patient denies headaches,  blurry vision and  right upper quadrant pain. denies CP, denies SOB. Patient reports no nausea/no vomiting.     Objective:       /81   Pulse 89   Temp 98.2 °F (36.8 °C) (Oral)   Resp 18   Ht 5' 4" (1.626 m)   Wt 73.9 kg (162 lb 14.7 oz)   LMP 2016 (Exact Date)   SpO2 100%   Breastfeeding? Unknown   BMI 27.97 kg/m²       General:   alert, appears stated age and cooperative   Lungs:   clear to auscultation bilaterally   Heart:   regular rate and rhythm, S1, S2 normal, no murmur, click, rub or gallop   Abdomen:  soft, non-tender; bowel sounds normal; no masses,  no organomegaly   Uterine Size:  medium located at the umblicus.    Extremities: peripheral pulses normal, no pedal edema, no clubbing or cyanosis   Reflexes - 2+  bilaterally brachioradials            Assessment:     31 y.o.  female , on magnesium sulfate    Active Hospital Problems    Diagnosis  POA    *Pre-eclampsia [O14.90]  Yes     (spontaneous vaginal delivery) [O80]  Not Applicable    Pregnancy with one fetus [Z34.90]  Not Applicable      Resolved Hospital Problems    Diagnosis Date Resolved POA   No resolved problems to display.        Plan:   1. Continue magnesium sulfate, no signs of toxicity at this time  2. Monitor for s/s of eclampsia  3. Close maternal monitoring including UOP and BP   - BP: (123-181)/() 136/81   - Urine output 700cc/2hrs  4. Recheck in 4 hours    Stephan Dodd MD   PGY-2 Ob-gyn    "

## 2017-06-10 VITALS
SYSTOLIC BLOOD PRESSURE: 130 MMHG | OXYGEN SATURATION: 100 % | DIASTOLIC BLOOD PRESSURE: 79 MMHG | BODY MASS INDEX: 27.82 KG/M2 | TEMPERATURE: 99 F | RESPIRATION RATE: 16 BRPM | WEIGHT: 162.94 LBS | HEIGHT: 64 IN | HEART RATE: 70 BPM

## 2017-06-10 PROCEDURE — 11000001 HC ACUTE MED/SURG PRIVATE ROOM

## 2017-06-10 RX ORDER — DOCUSATE SODIUM 100 MG/1
100 CAPSULE, LIQUID FILLED ORAL 2 TIMES DAILY
Status: DISCONTINUED | OUTPATIENT
Start: 2017-06-10 | End: 2017-06-11 | Stop reason: HOSPADM

## 2017-06-10 RX ORDER — IRON POLYSACCHARIDE COMPLEX 150 MG
150 CAPSULE ORAL DAILY
Status: DISCONTINUED | OUTPATIENT
Start: 2017-06-10 | End: 2017-06-11 | Stop reason: HOSPADM

## 2017-06-10 NOTE — ASSESSMENT & PLAN NOTE
- s/p magnesium sulfate (short protocol)  - no HA, vision changes, difficulty breathing, chest pain, RUQ pain or new leg edema  - Continue to clinically monitor  - BP: (130-142)/(81-90) 138/82    No acute issues

## 2017-06-10 NOTE — HPI
Sarah Lee is a 31 y.o.  female with IUP at 37w1d weeks gestation who is admitted for induction of labor for pre-eclampsia without SF. Pt denies any HA, visual changes, SOB, CP, N/V, or RUQ pain at this time.       This IUP is complicated by Pre-e without SF, GERD, PUD.

## 2017-06-10 NOTE — SUBJECTIVE & OBJECTIVE
Hospital course: Ms Stanley is PPD#2 s/p FAVD.  Patient doing well, voiding spontaneously, ambulating without difficulty, vaginal bleeding stable and decreasing, pain well controlled. Patient is breastfeeding.  Denies HA, vision changes. Patient is bottle feeding.     Objective:     Vital Signs (Most Recent):  Temp: 98.3 °F (36.8 °C) (06/10/17 0410)  Pulse: 82 (06/10/17 0410)  Resp: 18 (06/10/17 0410)  BP: 138/82 (06/10/17 0400)  SpO2: 100 % (06/10/17 0410) Vital Signs (24h Range):  Temp:  [97.8 °F (36.6 °C)-98.7 °F (37.1 °C)] 98.3 °F (36.8 °C)  Pulse:  [70-89] 82  Resp:  [16-18] 18  SpO2:  [99 %-100 %] 100 %  BP: (130-142)/(81-90) 138/82     Weight: 73.9 kg (162 lb 14.7 oz)  Body mass index is 27.97 kg/m².      Intake/Output Summary (Last 24 hours) at 06/10/17 0709  Last data filed at 06/09/17 1039   Gross per 24 hour   Intake                0 ml   Output              250 ml   Net             -250 ml       Significant Labs:  Lab Results   Component Value Date    GROUPTRH O POS 06/08/2017    HEPBSAG Negative 11/10/2016    STREPBCULT NO BETA HEMOLYTIC STREPTOCOCCUS FOUND 11/23/2012       Recent Labs  Lab 06/09/17  0507   HGB 9.8*   HCT 29.9*       I have personallly reviewed all pertinent lab results from the last 24 hours.    Physical Exam:   Constitutional: She appears well-developed and well-nourished. No distress.       Cardiovascular: Normal rate and regular rhythm.     Pulmonary/Chest: Effort normal.        Abdominal: Soft. She exhibits no distension and no abdominal incision. There is no tenderness.     Genitourinary: Vagina normal.                Skin: She is not diaphoretic.

## 2017-06-10 NOTE — ASSESSMENT & PLAN NOTE
- Continue routine post partum advances  - Diet: regular  - Lactation: breast  - Contraception: discuss outpatient  - Encourage ambulation  - Pain well controlled on PO analgesics. Continue to monitor pain scale  - VSS. Wnl. Vitals q4    D/c home

## 2017-06-10 NOTE — HOSPITAL COURSE
Brought to a labor room where labor induction/augmentation was initiated with pitocin and artificial rupture of membranes. Patient's BP's increased to the severe range and IV magnesium sulfate was initiated for seizure prophylaxis.  Labor progressed in routine fashion.  Due to terminal fetal heart rate decelerations, forceps assisted delivery was performed.  See delivery note for additional details.. She was moved to the post-partum unit after delivery for recovery. Post-partum H/h was 9.8/29. Blood pressures resolved to normal/mild range and MgSO4 discontinued 12 hours after delivery.  She had an otherwise uncomplicated recovery and was discharged from the hospital on PPD#2 in stable condition to follow-up in 1 week for BP check.

## 2017-06-10 NOTE — DISCHARGE SUMMARY
[facility]  Obstetrics  Discharge Summary      Patient Name: Sarah Lee  MRN: [id number]  Admission Date: 2017  Hospital Length of Stay: 2 days  Discharge Date and Time:  06/10/2017 7:42 AM  Attending Physician: Vicki Teran MD   Discharging Provider: Sukh Liz MD  Primary Care Provider: Tavia Daley MD    HPI: Sarah Lee is a 31 y.o.  female with IUP at 37w1d weeks gestation who is admitted for induction of labor for pre-eclampsia without SF. Pt denies any HA, visual changes, SOB, CP, N/V, or RUQ pain at this time.       This IUP is complicated by Pre-e without SF, GERD, PUD.       * No surgery found *     Hospital Course:   Brought to a labor room where labor induction/augmentation was initiated with pitocin and artificial rupture of membranes. Patient's BP's increased to the severe range and IV magnesium sulfate was initiated for seizure prophylaxis.  Labor progressed in routine fashion.  Due to terminal fetal heart rate decelerations, forceps assisted delivery was performed.  See delivery note for additional details.. She was moved to the post-partum unit after delivery for recovery. Post-partum H/h was 9.8/29. Blood pressures resolved to normal/mild range and MgSO4 discontinued 12 hours after delivery.  She had an otherwise uncomplicated recovery and was discharged from the hospital on PPD#2 in stable condition to follow-up in 1 week for BP check.        Final Active Diagnoses:    Diagnosis Date Noted POA    PRINCIPAL PROBLEM:  Pre-eclampsia [O14.90] 2017 Yes     (spontaneous vaginal delivery) [O80] 2017 Not Applicable    Pregnancy with one fetus [Z34.90] 11/10/2016 Not Applicable      Problems Resolved During this Admission:    Diagnosis Date Noted Date Resolved POA        Labs:   CBC   Recent Labs  Lab 17  0507   WBC 13.77*   HGB 9.8*   HCT 29.9*          Feeding Method: bottle    Immunizations     Date Immunization Status  Dose Route/Site Given by    06/08/17 1608 MMR Incomplete 0.5 mL Subcutaneous/Left deltoid     06/08/17 1608 Tdap Incomplete 0.5 mL Intramuscular/Left deltoid           Delivery:    Episiotomy: None   Lacerations: None   Repair suture: None   Repair # of packets:     Blood loss (ml): 255     Birth information:  YOB: 2018   Time of birth: 12:42 PM   Sex: male   Delivery type: Vaginal, Forceps   Gestational Age: 37w1d    Delivery Clinician:      Other providers:       Additional  information:  Forceps:    Vacuum:    Breech:    Observed anomalies      Living?:           APGARS  One minute Five minutes Ten minutes   Skin color:         Heart rate:         Grimace:         Muscle tone:         Breathing:         Totals: 9  9        Placenta: Delivered:       appearance    Pending Diagnostic Studies:     None          Discharged Condition: good    Disposition:     Follow Up:  Follow-up Information     Vicki Teran MD In 1 week.    Specialties:  Obstetrics, Obstetrics and Gynecology  Why:  BP check  Contact information:  [address] [place] [address] [id number] [phone]                 Patient Instructions:     Diet general     Activity as tolerated     Call MD for:  persistent dizziness, light-headedness, or visual disturbances     Call MD for:  worsening rash     Call MD for:  severe persistent headache     Call MD for:  difficulty breathing or increased cough     Call MD for:  redness, tenderness, or signs of infection (pain, swelling, redness, odor or green/yellow discharge around incision site)     Call MD for:  severe uncontrolled pain     Call MD for:  persistent nausea and vomiting or diarrhea     Call MD for:  temperature >100.4       Medications:  Current Discharge Medication List      START taking these medications    Details   oxycodone-acetaminophen (PERCOCET) 5-325 mg per tablet Take 1 tablet by mouth every 4 (four) hours as needed.  Qty: 10 tablet, Refills: 0    Associated  Diagnoses:  (spontaneous vaginal delivery)         CONTINUE these medications which have NOT CHANGED    Details   PRENATAL VIT #91/FE FUM/FA/DHA (PRENATAL + DHA ORAL) Take by mouth.             Sukh Liz MD  Obstetrics  [facility]

## 2017-06-10 NOTE — PROGRESS NOTES
[facility]  Obstetrics  Postpartum Progress Note    Patient Name: Sarah Lee  MRN: [id number]  Admission Date: 6/8/2017  Hospital Length of Stay: 2 days  Attending Physician: Vicki Teran MD  Primary Care Provider: Tavia Daley MD    Subjective:     Principal Problem:Pre-eclampsia    Hospital course: Ms Stanley is PPD#2 s/p FAVD.  Patient doing well, voiding spontaneously, ambulating without difficulty, vaginal bleeding stable and decreasing, pain well controlled. Patient is breastfeeding.  Denies HA, vision changes. Patient is bottle feeding.     Objective:     Vital Signs (Most Recent):  Temp: 98.3 °F (36.8 °C) (06/10/17 0410)  Pulse: 82 (06/10/17 0410)  Resp: 18 (06/10/17 0410)  BP: 138/82 (06/10/17 0400)  SpO2: 100 % (06/10/17 0410) Vital Signs (24h Range):  Temp:  [97.8 °F (36.6 °C)-98.7 °F (37.1 °C)] 98.3 °F (36.8 °C)  Pulse:  [70-89] 82  Resp:  [16-18] 18  SpO2:  [99 %-100 %] 100 %  BP: (130-142)/(81-90) 138/82     Weight: 73.9 kg (162 lb 14.7 oz)  Body mass index is 27.97 kg/m².      Intake/Output Summary (Last 24 hours) at 06/10/17 0709  Last data filed at 06/09/17 1039   Gross per 24 hour   Intake                0 ml   Output              250 ml   Net             -250 ml       Significant Labs:  Lab Results   Component Value Date    GROUPTRH O POS 06/08/2017    HEPBSAG Negative 11/10/2016    STREPBCULT NO BETA HEMOLYTIC STREPTOCOCCUS FOUND 11/23/2012       Recent Labs  Lab 06/09/17  0507   HGB 9.8*   HCT 29.9*       I have personallly reviewed all pertinent lab results from the last 24 hours.    Physical Exam:   Constitutional: She appears well-developed and well-nourished. No distress.       Cardiovascular: Normal rate and regular rhythm.     Pulmonary/Chest: Effort normal.        Abdominal: Soft. She exhibits no distension and no abdominal incision. There is no tenderness.     Genitourinary: Vagina normal.                Skin: She is not diaphoretic.         Assessment/Plan:     31 y.o. female  for:     (spontaneous vaginal delivery)    - Continue routine post partum advances  - Diet: regular  - Lactation: breast  - Contraception: discuss outpatient  - Encourage ambulation  - Pain well controlled on PO analgesics. Continue to monitor pain scale  - VSS. Wnl. Vitals q4    D/c home        * Pre-eclampsia    - s/p magnesium sulfate (short protocol)  - no HA, vision changes, difficulty breathing, chest pain, RUQ pain or new leg edema  - Continue to clinically monitor  - BP: (130-142)/(81-90) 138/82    No acute issues              Disposition: As patient meets milestones, will plan to discharge today.    Sukh Liz MD  Obstetrics  [facility]

## 2017-06-11 NOTE — PLAN OF CARE
Pt discharged to room to care for baby under phototherapy. All discharge instructions and medication instructions reviewed. Verbalizes understanding. Will follow up in one week in clinic for a blood pressure check.

## 2017-07-11 ENCOUNTER — TELEPHONE (OUTPATIENT)
Dept: OBSTETRICS AND GYNECOLOGY | Facility: CLINIC | Age: 32
End: 2017-07-11

## 2017-07-11 NOTE — TELEPHONE ENCOUNTER
Spoke with pt.Denies feeling she may harm herself or baby. Pt declines appt for today (she does not have a ride). Advised if feeling she may harm herself or baby please report to ER immediately. Pt states she just feels very overwhelmed and she feels she is yelling a lot. Not feeling herself. Asked again if there is anyway she can make it in on today. Pt declines. Pt states she can come tomorrow morning. Appt offered/scheduled for 7-12 at 8:30am. Pt aware time/location     Again stressed ER precautions to pt and she voiced understanding

## 2017-07-11 NOTE — TELEPHONE ENCOUNTER
----- Message from Krys Eng sent at 7/11/2017 10:41 AM CDT -----  Contact: self  Pt delivered on 06/08/17, pt states she is suffering from severe post partum depression, (the baby pediatrician diagnosed her) she is needing an appt and can be reached at [phone].

## 2017-07-12 ENCOUNTER — PATIENT MESSAGE (OUTPATIENT)
Dept: OBSTETRICS AND GYNECOLOGY | Facility: CLINIC | Age: 32
End: 2017-07-12

## 2017-07-12 ENCOUNTER — POSTPARTUM VISIT (OUTPATIENT)
Dept: OBSTETRICS AND GYNECOLOGY | Facility: CLINIC | Age: 32
End: 2017-07-12
Payer: COMMERCIAL

## 2017-07-12 VITALS
DIASTOLIC BLOOD PRESSURE: 70 MMHG | WEIGHT: 147.94 LBS | SYSTOLIC BLOOD PRESSURE: 110 MMHG | HEIGHT: 64 IN | BODY MASS INDEX: 25.25 KG/M2

## 2017-07-12 PROCEDURE — 99213 OFFICE O/P EST LOW 20 MIN: CPT | Mod: 24,S$GLB,, | Performed by: OBSTETRICS & GYNECOLOGY

## 2017-07-12 PROCEDURE — 99999 PR PBB SHADOW E&M-EST. PATIENT-LVL III: CPT | Mod: PBBFAC,,, | Performed by: OBSTETRICS & GYNECOLOGY

## 2017-07-12 RX ORDER — SERTRALINE HYDROCHLORIDE 50 MG/1
50 TABLET, FILM COATED ORAL DAILY
Qty: 30 TABLET | Refills: 6 | Status: SHIPPED | OUTPATIENT
Start: 2017-07-12 | End: 2017-07-12 | Stop reason: SDUPTHER

## 2017-07-12 RX ORDER — ACETAMINOPHEN 325 MG/1
325 TABLET ORAL EVERY 6 HOURS PRN
COMMUNITY
End: 2017-07-27

## 2017-07-12 RX ORDER — SERTRALINE HYDROCHLORIDE 50 MG/1
50 TABLET, FILM COATED ORAL DAILY
Qty: 30 TABLET | Refills: 6 | Status: SHIPPED | OUTPATIENT
Start: 2017-07-12 | End: 2017-08-17 | Stop reason: SDUPTHER

## 2017-07-12 NOTE — PROGRESS NOTES
"CC: Post-partum follow-up    Sarah Lee is a 32 y.o. female  who presents for post-partum visit.  She is S/P a  17.  She is having severe depression, crying all the time.  She is not sleeping, have an appetite or eating.   She feels overwhelmed- NO HELP with baby.  She has NO suicidal or homicidal ideations.      Breast Feeding: NO  Depression: YES      Pregnancy was complicated by:  PRE E    /70   Ht 5' 4" (1.626 m)   Wt 67.1 kg (147 lb 14.9 oz)   LMP 2016 (Exact Date)   Breastfeeding? No   BMI 25.39 kg/m²     ROS:  GENERAL: No fever, chills, fatigability.  VULVAR: No pain, no lesions and no itching.  VAGINAL: No relaxation, no itching, no discharge, no abnormal bleeding and no lesions.  ABDOMEN: No abdominal pain. Denies nausea. Denies vomiting. No diarrhea. No constipation  BREAST: Denies pain. No lumps.  URINARY: No incontinence, no nocturia, no frequency and no dysuria.  CARDIOVASCULAR: No chest pain. No shortness of breath. No leg cramps.  NEUROLOGICAL: No headaches. No vision changes.    PHYSICAL EXAM:  Deferred  IMP:  POST PARTUM DEPRESSION    Rx zoloft    PLAN:  Has therapy set up next week  Preacautions given  Behavioral modification discussed  WILL sleep today, get something to eat and rest  Return: 1 wk        "

## 2017-07-12 NOTE — TELEPHONE ENCOUNTER
please send rx to [facility] instead:    Hey my medication was sent to [facility]. I forgot my insurance no longer pay for [facility] medicines. Can you please send it to my job which is [employer].

## 2017-07-17 ENCOUNTER — PATIENT MESSAGE (OUTPATIENT)
Dept: OBSTETRICS AND GYNECOLOGY | Facility: CLINIC | Age: 32
End: 2017-07-17

## 2017-07-20 ENCOUNTER — PATIENT MESSAGE (OUTPATIENT)
Dept: OBSTETRICS AND GYNECOLOGY | Facility: CLINIC | Age: 32
End: 2017-07-20

## 2017-07-20 ENCOUNTER — POSTPARTUM VISIT (OUTPATIENT)
Dept: OBSTETRICS AND GYNECOLOGY | Facility: CLINIC | Age: 32
End: 2017-07-20
Payer: COMMERCIAL

## 2017-07-20 VITALS
WEIGHT: 145.31 LBS | BODY MASS INDEX: 24.21 KG/M2 | HEIGHT: 65 IN | DIASTOLIC BLOOD PRESSURE: 82 MMHG | SYSTOLIC BLOOD PRESSURE: 118 MMHG

## 2017-07-20 PROCEDURE — 99999 PR PBB SHADOW E&M-EST. PATIENT-LVL II: CPT | Mod: PBBFAC,,, | Performed by: OBSTETRICS & GYNECOLOGY

## 2017-07-20 PROCEDURE — 0503F POSTPARTUM CARE VISIT: CPT | Mod: S$GLB,,, | Performed by: OBSTETRICS & GYNECOLOGY

## 2017-07-20 NOTE — PROGRESS NOTES
"CC: Post-partum follow-up    Sarah Lee is a 32 y.o. female  who presents for post-partum visit.  She is sufferring with PP depression.  She was put on zoloft last visit and is seeing therapist weekly.  Her sx are severe.   She has a flat affect today./   She does not have her children at this time because her family is keeping the kids away from her.  She has no suicidal or homicidal ideations.       Pregnancy was complicated by:  PP depression    /82   Ht 5' 5" (1.651 m)   Wt 65.9 kg (145 lb 4.5 oz)   LMP 2017   Breastfeeding? No   BMI 24.18 kg/m²     ROS:  GENERAL: No fever, chills, fatigability.  VULVAR: No pain, no lesions and no itching.  VAGINAL: No relaxation, no itching, no discharge, no abnormal bleeding and no lesions.  ABDOMEN: No abdominal pain. Denies nausea. Denies vomiting. No diarrhea. No constipation  BREAST: Denies pain. No lumps.  URINARY: No incontinence, no nocturia, no frequency and no dysuria.  CARDIOVASCULAR: No chest pain. No shortness of breath. No leg cramps.  NEUROLOGICAL: No headaches. No vision changes.    PHYSICAL EXAM:  Exam chaperoned by nurse  ABDOMEN:  Soft, non-tender, non-distended      IMP:  Doing well S/P   Instructions / precautions reviewed    Rx INCREASE dose to zoloft 100 mg daily    PLAN:  Please follow up next week  Precautions given  Will go to the ER for suicidal ideations or homicidal ideations  Concern for the patient-  Called multiple psych offices to get her in with psych   She will comply with instructions          "

## 2017-07-27 ENCOUNTER — TELEPHONE (OUTPATIENT)
Dept: OBSTETRICS AND GYNECOLOGY | Facility: CLINIC | Age: 32
End: 2017-07-27

## 2017-07-27 ENCOUNTER — POSTPARTUM VISIT (OUTPATIENT)
Dept: OBSTETRICS AND GYNECOLOGY | Facility: CLINIC | Age: 32
End: 2017-07-27
Payer: COMMERCIAL

## 2017-07-27 VITALS
DIASTOLIC BLOOD PRESSURE: 76 MMHG | SYSTOLIC BLOOD PRESSURE: 120 MMHG | HEIGHT: 65 IN | WEIGHT: 148.13 LBS | BODY MASS INDEX: 24.68 KG/M2

## 2017-07-27 PROCEDURE — 99999 PR PBB SHADOW E&M-EST. PATIENT-LVL III: CPT | Mod: PBBFAC,,, | Performed by: OBSTETRICS & GYNECOLOGY

## 2017-07-27 PROCEDURE — 0502F SUBSEQUENT PRENATAL CARE: CPT | Mod: S$GLB,,, | Performed by: OBSTETRICS & GYNECOLOGY

## 2017-07-27 NOTE — TELEPHONE ENCOUNTER
Called  with [facility] (outpatient) [id number] spoke with Aleyda. Aleyda states there is no  with ob/gyn exactly. They do help with inpatient/outpatient pt's. Placed me on brief hold while she spoke with a partner whom could possibly help.     Aleyda/partner recommends since there is no ER at the site we are located today ([place]) that we call 911 immediately and have them escort the pt to the ER. Also if she is with a family member have them take the kids - needs to be involved. Advised Aleyda pt alone in the office today. Aleyda recommends the kids stay with a family member while patient is escorted by police to ER. Pt needs to be evaluated for homicidal/suicidal ideations immediately    Spoke with  regarding this matter! See chart note.     Pt left office without being escorted. Will report to ER now!

## 2017-08-01 ENCOUNTER — HOSPITAL ENCOUNTER (EMERGENCY)
Facility: OTHER | Age: 32
Discharge: PSYCHIATRIC OR SUBSTANCE USE FACILITY | End: 2017-08-02
Attending: EMERGENCY MEDICINE
Payer: COMMERCIAL

## 2017-08-01 VITALS
WEIGHT: 145 LBS | BODY MASS INDEX: 24.75 KG/M2 | HEIGHT: 64 IN | DIASTOLIC BLOOD PRESSURE: 75 MMHG | OXYGEN SATURATION: 100 % | SYSTOLIC BLOOD PRESSURE: 127 MMHG | TEMPERATURE: 98 F | RESPIRATION RATE: 16 BRPM | HEART RATE: 79 BPM

## 2017-08-01 LAB
ALBUMIN SERPL BCP-MCNC: 4 G/DL
ALP SERPL-CCNC: 110 U/L
ALT SERPL W/O P-5'-P-CCNC: 22 U/L
AMPHET+METHAMPHET UR QL: NEGATIVE
ANION GAP SERPL CALC-SCNC: 10 MMOL/L
ANISOCYTOSIS BLD QL SMEAR: SLIGHT
APAP SERPL-MCNC: <3 UG/ML
AST SERPL-CCNC: 21 U/L
B-HCG UR QL: NEGATIVE
BACTERIA #/AREA URNS HPF: ABNORMAL /HPF
BARBITURATES UR QL SCN>200 NG/ML: NEGATIVE
BASOPHILS # BLD AUTO: 0.03 K/UL
BASOPHILS NFR BLD: 0.5 %
BENZODIAZ UR QL SCN>200 NG/ML: NEGATIVE
BILIRUB SERPL-MCNC: 0.8 MG/DL
BILIRUB UR QL STRIP: NEGATIVE
BUN SERPL-MCNC: 10 MG/DL
BZE UR QL SCN: NEGATIVE
CALCIUM SERPL-MCNC: 9.4 MG/DL
CANNABINOIDS UR QL SCN: NEGATIVE
CHLORIDE SERPL-SCNC: 103 MMOL/L
CLARITY UR: CLEAR
CO2 SERPL-SCNC: 27 MMOL/L
COLOR UR: YELLOW
CREAT SERPL-MCNC: 0.7 MG/DL
CREAT UR-MCNC: 150.1 MG/DL
CTP QC/QA: YES
DIFFERENTIAL METHOD: ABNORMAL
EOSINOPHIL # BLD AUTO: 0.1 K/UL
EOSINOPHIL NFR BLD: 1.2 %
ERYTHROCYTE [DISTWIDTH] IN BLOOD BY AUTOMATED COUNT: 14.8 %
EST. GFR  (AFRICAN AMERICAN): >60 ML/MIN/1.73 M^2
EST. GFR  (NON AFRICAN AMERICAN): >60 ML/MIN/1.73 M^2
ETHANOL SERPL-MCNC: <10 MG/DL
GLUCOSE SERPL-MCNC: 103 MG/DL
GLUCOSE UR QL STRIP: NEGATIVE
GRAN CASTS #/AREA URNS LPF: 1 /LPF
HCT VFR BLD AUTO: 37.3 %
HGB BLD-MCNC: 12.2 G/DL
HGB UR QL STRIP: ABNORMAL
HYALINE CASTS #/AREA URNS LPF: 4 /LPF
KETONES UR QL STRIP: NEGATIVE
LEUKOCYTE ESTERASE UR QL STRIP: ABNORMAL
LYMPHOCYTES # BLD AUTO: 1.3 K/UL
LYMPHOCYTES NFR BLD: 21.6 %
MCH RBC QN AUTO: 24.4 PG
MCHC RBC AUTO-ENTMCNC: 32.7 G/DL
MCV RBC AUTO: 75 FL
METHADONE UR QL SCN>300 NG/ML: NEGATIVE
MICROSCOPIC COMMENT: ABNORMAL
MONOCYTES # BLD AUTO: 0.4 K/UL
MONOCYTES NFR BLD: 7.1 %
NEUTROPHILS # BLD AUTO: 4.1 K/UL
NEUTROPHILS NFR BLD: 69.6 %
NITRITE UR QL STRIP: NEGATIVE
OPIATES UR QL SCN: NEGATIVE
PCP UR QL SCN>25 NG/ML: NEGATIVE
PH UR STRIP: 6 [PH] (ref 5–8)
PLATELET # BLD AUTO: 264 K/UL
PLATELET BLD QL SMEAR: ABNORMAL
PMV BLD AUTO: 9 FL
POTASSIUM SERPL-SCNC: 3.9 MMOL/L
PROT SERPL-MCNC: 7.9 G/DL
PROT UR QL STRIP: NEGATIVE
RBC # BLD AUTO: 5.01 M/UL
RBC #/AREA URNS HPF: 5 /HPF (ref 0–4)
SODIUM SERPL-SCNC: 140 MMOL/L
SP GR UR STRIP: 1.02 (ref 1–1.03)
SQUAMOUS #/AREA URNS HPF: 1 /HPF
TOXICOLOGY INFORMATION: NORMAL
TSH SERPL DL<=0.005 MIU/L-ACNC: 0.67 UIU/ML
URN SPEC COLLECT METH UR: ABNORMAL
UROBILINOGEN UR STRIP-ACNC: NEGATIVE EU/DL
WBC # BLD AUTO: 5.89 K/UL
WBC #/AREA URNS HPF: 25 /HPF (ref 0–5)
WBC CLUMPS URNS QL MICRO: ABNORMAL

## 2017-08-01 PROCEDURE — 80320 DRUG SCREEN QUANTALCOHOLS: CPT

## 2017-08-01 PROCEDURE — 85025 COMPLETE CBC W/AUTO DIFF WBC: CPT

## 2017-08-01 PROCEDURE — 84443 ASSAY THYROID STIM HORMONE: CPT

## 2017-08-01 PROCEDURE — 81025 URINE PREGNANCY TEST: CPT | Performed by: EMERGENCY MEDICINE

## 2017-08-01 PROCEDURE — 99285 EMERGENCY DEPT VISIT HI MDM: CPT | Mod: 25

## 2017-08-01 PROCEDURE — 80329 ANALGESICS NON-OPIOID 1 OR 2: CPT

## 2017-08-01 PROCEDURE — 80053 COMPREHEN METABOLIC PANEL: CPT

## 2017-08-01 PROCEDURE — 81000 URINALYSIS NONAUTO W/SCOPE: CPT

## 2017-08-01 PROCEDURE — 80307 DRUG TEST PRSMV CHEM ANLYZR: CPT

## 2017-08-01 PROCEDURE — 99242 OFF/OP CONSLTJ NEW/EST SF 20: CPT | Mod: GT,,, | Performed by: PSYCHIATRY & NEUROLOGY

## 2017-08-01 NOTE — ED NOTES
Spoke with Jonathon at [facility] and they have accepted the patient for admission but the bed will not be available until 8:00pm.

## 2017-08-01 NOTE — CONSULTS
"Tele-Consultation to Emergency Department from Psychiatry    Please see previous notes:    Patient agreeable to consultation via telepsychiatry.    Consultation started: 8/1/2017 at 3:05pm   The chief complaint leading to psychiatric consultation is: depressed mood and increased anger  This consultation was requested by , the Emergency Department attending physician.  The location of the consulting psychiatrist is [address] [place] [address].  The patient location is [facility].  The patient arrived at the ED at:   10:31 am  Also present with the patient at the time of the consultation: none    Patient Identification:  Sarah Lee is a 32 y.o. female.    Patient information was obtained from patient.  Patient presented voluntarily to the Emergency Department ambulatory.    History of Present Illness:   2 children (4 yr and 7 weeks)      Depression  Patient complains of depression. She complains of anhedonia, depressed mood, difficulty concentrating, fatigue, feelings of worthlessness/guilt, hopelessness, insomnia, psychomotor agitation, suicidal thoughts without plan and weight loss. Onset was approximately 2 weeks ago. Symptoms have been rapidly worsening since that time.   Patient denies hypersomnia, psychomotor retardation, suicidal attempt, suicidal thoughts with specific plan and weight gain. Family history significant for no psychiatric illness. Possible organic causes contributing are: endocrine/metabolic, nutritional. Risk factors: none. Previous treatment includes none.   Pt reports that her OB Gyn put pt on Zoloft. She states that she tried it but made pt feel "not good" and experienced side effects (dizzines, headches and GI problems) so she stopped it after 3 days. Currently she reports taking it intermittently sometimes every other day. She notes that her anger level has increased and has been lashing out at her BF. She states that their relationship is not strained " "but he is not supportive in care of her children and is "only financially supportive" She states that she has been throwing things at him when she gets mad but has not physically injured him but is worried she may. She also expresses worry that she may lose her temper on her kids. She states that she no longer enjoys doing anything in life. She reports becoming isolative and unable to identify any support system including her sister who has not been helpful and tells pt to "just suck it up". She also is unable to identify any friends either who can help her out. She states she even went to therapy for a week but didn't find it beneficial for improving her mood. Pt wishes that she wasn't in this situation of taking care of 2 kids and that she might have been more happy. However pt has not expressed any Active Si or HI to wards her kids or BF or anyone else.     Psychiatric History:   Hospitalization: No  Medication Trials: No  Suicide Attempts: no  Violence: increased anger and throwing things at BF.   Depression: yes  Christi: none  AH's: none  Delusions: none  Trauma: witnessed both mother and father pass away due to natural causes    Review of Systems:  General ROS: positive for  - fatigue  negative for - chills, fever, hot flashes or night sweats  Respiratory ROS: no cough, shortness of breath, or wheezing  Cardiovascular ROS: no chest pain or dyspnea on exertion  Gastrointestinal ROS: no abdominal pain, change in bowel habits, or black or bloody stools  Musculoskeletal ROS: negative for - gait disturbance, joint stiffness or joint swelling  Neurological ROS: no TIA or stroke symptoms    Past Medical History:   Past Medical History:   Diagnosis Date    Depression     GERD (gastroesophageal reflux disease)     Gestational hypertension     History of bleeding ulcers     upper stomach    Ovarian cyst, left     Stomach ulcer     Tooth abscess     left lower tooth abcess    Ulcer         Seizures: none  Head " "trauma/l.o.c.: none  Wish to become pregnant[if female of childbearing age]: no    Allergies:   Review of patient's allergies indicates:  No Known Allergies    Medications in ER: Medications - No data to display    Medications at home:   none    Substance Abuse History:   Alchohol: none, last drink many year   Drug:none    Legal History:   Past charges/incarcerations: none  Pending charges: none    Family Psychiatric History: none    Social History:   History of Physical/Sexual Abuse: none  Education: some college  Employment/Disability: [employer]ina at [employer]  Financial: stable  Relationship Status/Sexual Orientation: yes BF 7 yrs  Children:2  Housing Status: alone with kids  Roman Catholic: yes [employer]    History: none  Recreational Activities: spend time with kids  Access to Gun: none     Current Evaluation:     Constitutional  Vitals:  Vitals:    08/01/17 1022 08/01/17 1403   BP: (!) 150/77 127/75   Pulse: 106 79   Resp: 18 16   Temp: 98.1 °F (36.7 °C)    TempSrc: Oral    SpO2: 99% 100%   Weight: 65.8 kg (145 lb)    Height: 5' 4" (1.626 m)       General:  unremarkable, age appropriate     Musculoskeletal  Muscle Strength/Tone:   moving arms normally   Gait & Station:   sitting on stretcher     Psychiatric  Level of Consciousness: alert  Orientation: oriented to person, place and time  Grooming: in hospital gown  Psychomotor Behavior: no agitation  Speech: normal in rate, rhythm and volume  Language: uses words appropriately  Mood: "feeling depressed"  Affect: anxious, dysthymic, blunted  Thought Process: linear goal directed  Associations: none  Thought Content: Passive Si, no active plans, no HI/AVH  Memory:  intact to interview  Attention: intact to interview  Fund of Knowledge: appears adequate  Insight: intact  Judgement: intact    Relevant Elements of Neurological Exam: no abnormality of posture noted    Assessment - Diagnosis - Goals:     Diagnosis/Impression:   Post Partum depression with severe features "     Rec:   · Dispo- Once medically cleared; Seek Involuntary Inpt psychiatric admission for stabilization of acute psychiatric symptoms. Due to pts poor support system and severe depression symptoms pt is at high risk for hurting self or others (denied any thoughts of hurting her children, but also states she worries about her temper and anger getting out of control)  · Please consult Case Management for assistance with arranging caretakers for pts children, currently they are under care of her boyfriend but she reports he doesn't necessarily want to.     · Psych meds- defer to Inpt psych unit. May use Vistaril 25mg TID prn anxiety or insomnia.    · Legal- Legal-Seek/continue PEC because pt is in imminent danger of hurting self or others     Time with patient: 32 min    More than 50% of the time was spent counseling/coordinating care    Laboratory Data:   Labs Reviewed   CBC W/ AUTO DIFFERENTIAL - Abnormal; Notable for the following:        Result Value    MCV 75 (*)     MCH 24.4 (*)     RDW 14.8 (*)     MPV 9.0 (*)     All other components within normal limits   URINALYSIS - Abnormal; Notable for the following:     Occult Blood UA Trace (*)     Leukocytes, UA 1+ (*)     All other components within normal limits   ACETAMINOPHEN LEVEL - Abnormal; Notable for the following:     Acetaminophen (Tylenol), Serum <3.0 (*)     All other components within normal limits   URINALYSIS MICROSCOPIC - Abnormal; Notable for the following:     RBC, UA 5 (*)     WBC, UA 25 (*)     Bacteria, UA Few (*)     Hyaline Casts, UA 4 (*)     Granular Casts, UA 1 (*)     All other components within normal limits   COMPREHENSIVE METABOLIC PANEL   TSH   ALCOHOL,MEDICAL (ETHANOL)   DRUG SCREEN PANEL, URINE EMERGENCY   POCT URINE PREGNANCY         Consulting clinician was informed of the encounter and consult note.    Consultation ended: 8/1/2017 at 3:37pm

## 2017-08-01 NOTE — ED PROVIDER NOTES
"Encounter Date: 2017    SCRIBE #1 NOTE: I, Kellie Vela, am scribing for, and in the presence of,  Dr. Moon. I have scribed the entire note.       History     Chief Complaint   Patient presents with    Depression     Pt sent to ED by OBGYN for post-partum depression.     Time seen by provider: 11:16 AM    This is a 32 y.o. female, A1, who presents with complaint of post-partum depression x 3 weeks. Pt gave birth 2017. She has no history of post-partum depression with previous child but denies any outlying difference with her new child. Pt does admit that her fiancee is not around to help take care of the children, explaining she takes care of both at all times. Pt notes feeling overwhelmed, sad, and angry. She has not been sleeping well. Pt has been following up with OB/GYN and started Zoloft a week ago due to depression. Her doctor is concerned because pt has not been able to get an appointment to see a psychiatrist. She is concerned due to pt's anger issues and worries that the pt will harm the children. Pt has been seeing a therapist weekly. She admits to anger episodes when frustrated, and that she "throws stuff," but states that she does not direct her episodes of anger towards her children. She does state that she has been agitated to the point of not thinking clearly but does not believe she will hurt her children. Pt is willing to admit for depression, but is concerned since she has no help with kids at home. She denies any fever, HA, back pain, neck pain, abdominal pain, pelvic pain, or vaginal bleeding. She denies any HI or SI. She has no thoughts of hurting herself or others. She also denies any hallucinations.      The history is provided by the patient.     Review of patient's allergies indicates:  No Known Allergies  Past Medical History:   Diagnosis Date    Depression     GERD (gastroesophageal reflux disease)     Gestational hypertension     History of bleeding " ulcers     upper stomach    Ovarian cyst, left     Stomach ulcer     Tooth abscess     left lower tooth abcess    Ulcer      History reviewed. No pertinent surgical history.  Family History   Problem Relation Age of Onset    Diabetes Paternal Grandmother     Hypertension Paternal Grandmother     Diabetes Maternal Grandmother     Hypertension Maternal Grandmother     Diabetes Mother     Hypertension Mother     Pancreatic cancer Mother 55    Coronary artery disease Father 50      of an MI age 61    Breast cancer Neg Hx     Colon cancer Neg Hx     Ovarian cancer Neg Hx      Social History   Substance Use Topics    Smoking status: Never Smoker    Smokeless tobacco: Never Used    Alcohol use No     Review of Systems   Constitutional: Negative for diaphoresis and fever.   HENT: Negative for ear pain and sore throat.    Eyes: Negative for pain and visual disturbance.   Respiratory: Negative for cough, shortness of breath and wheezing.    Cardiovascular: Negative for chest pain.   Gastrointestinal: Negative for abdominal pain, diarrhea, nausea and vomiting.   Genitourinary: Negative for difficulty urinating, dysuria, flank pain, pelvic pain, vaginal bleeding and vaginal pain.   Musculoskeletal: Negative for back pain and neck pain.   Skin: Negative for color change, pallor, rash and wound.   Neurological: Negative for dizziness, weakness, light-headedness, numbness and headaches.   Hematological: Does not bruise/bleed easily.   Psychiatric/Behavioral: Positive for agitation and sleep disturbance. Negative for confusion, decreased concentration, hallucinations, self-injury and suicidal ideas.        No SI or HI. No thoughts of harming self or others.        Physical Exam     Initial Vitals [17 1022]   BP Pulse Resp Temp SpO2   (!) 150/77 106 18 98.1 °F (36.7 °C) 99 %      MAP       101.33         Physical Exam    Nursing note and vitals reviewed.  Constitutional: She appears well-developed and  well-nourished. She is not diaphoretic. No distress.   HENT:   Head: Normocephalic and atraumatic.   Eyes: Right eye exhibits no discharge. Left eye exhibits no discharge.   Neck: Normal range of motion.   Cardiovascular: Normal rate, regular rhythm and normal heart sounds. Exam reveals no gallop and no friction rub.    No murmur heard.  Pulmonary/Chest: Breath sounds normal. No respiratory distress. She has no wheezes. She has no rhonchi. She has no rales.   Abdominal: Soft. Bowel sounds are normal. She exhibits no distension. There is no tenderness. There is no rebound and no guarding.   Musculoskeletal: Normal range of motion. She exhibits no edema or tenderness.   Neurological: She is alert and oriented to person, place, and time. She has normal strength. No sensory deficit.   Skin: Skin is warm and dry. No rash noted. No erythema.   Psychiatric: Her speech is normal and behavior is normal. Thought content normal. She exhibits a depressed mood.         ED Course   Procedures  Labs Reviewed   CBC W/ AUTO DIFFERENTIAL - Abnormal; Notable for the following:        Result Value    MCV 75 (*)     MCH 24.4 (*)     RDW 14.8 (*)     MPV 9.0 (*)     All other components within normal limits   URINALYSIS - Abnormal; Notable for the following:     Occult Blood UA Trace (*)     Leukocytes, UA 1+ (*)     All other components within normal limits   ACETAMINOPHEN LEVEL - Abnormal; Notable for the following:     Acetaminophen (Tylenol), Serum <3.0 (*)     All other components within normal limits   URINALYSIS MICROSCOPIC - Abnormal; Notable for the following:     RBC, UA 5 (*)     WBC, UA 25 (*)     Bacteria, UA Few (*)     Hyaline Casts, UA 4 (*)     Granular Casts, UA 1 (*)     All other components within normal limits   CULTURE, URINE   COMPREHENSIVE METABOLIC PANEL   TSH   ALCOHOL,MEDICAL (ETHANOL)   DRUG SCREEN PANEL, URINE EMERGENCY   POCT URINE PREGNANCY             Medical Decision Making:   History:   Old Medical  Records: I decided to obtain old medical records.  Initial Assessment:       32F 7 weeks s/p birth of 2nd child presents with worsening post-partum depression for the past 3 weeks. No psych hx or similar sx with previous child. No SI/HI/hallucinations, but told to come to ED by OB since she is unable to get Psych clinic appt and is developing episodes of anger, no improvement with Zoloft. Pt admits to anger but never directed to kids.     Labs checked and no acute process except possible UTI. Pt denies any urinary symptoms, will send urine culture and hold empiric treatment.     Pt seen by [facility] Psych via tele-evaluation, and they recommend inpatient placement and PEC due to worsening symptoms and concern for safety of kids. PEC placed, and social work consulted to help in caring of kids, since they are currently in care of children's father, who may not be able to care for them for days while pt is hospitalized.     Medically cleared for psychiatric admission    Clinical Tests:   Lab Tests: Ordered and Reviewed            Scribe Attestation:   Scribe #1: I performed the above scribed service and the documentation accurately describes the services I performed. I attest to the accuracy of the note.    Attending Attestation:           Physician Attestation for Scribe:  Physician Attestation Statement for Scribe #1: I, Dr. Moon, reviewed documentation, as scribed by Kellie Vela in my presence, and it is both accurate and complete.                 ED Course     Clinical Impression:     1. Post partum depression                                 Hitesh Moon MD  08/01/17 6648

## 2017-08-01 NOTE — ED NOTES
Spoke with case management regarding patient being PEC'd and not sure anyone will be able to watch her two kids at home while she is at a facility.   Kandis stated they will come and assess the patient.

## 2017-08-01 NOTE — ED NOTES
Per Dr. Copeland, 1:1 observation not needed. Pt allowed to wear her own clothes. ED tech Kim no longer at bedside.

## 2017-08-01 NOTE — ED NOTES
Pt resting quietly in stretcher, ED tech Kim at bedside for 1:1 observation. Pt remains calm and cooperative. Awaiting ready bed at psych facility. Room remains free of personal belongings. Will continue to monitor closely.

## 2017-08-01 NOTE — PLAN OF CARE
Consult requested from ER.  Met with patient in ER bed 2.  Pt very tearful.  Pt is PED'd with a 7 week old infant and a  4 year old toddler.  She stated that she had no one to care for the kids for 3 days. I asked her to speak with the children's father as he had them at this time.  Pt called and father of the children agreed to care for the children during pt's hospitalization.  Due to patient's minor children and lack of resources to care for them she requested to be a a facility in [place].  Case management expressed this request to the pt's nurse.  Nurse stated that she would ask Mhere to comply with the request if at all possible.

## 2017-08-01 NOTE — ED NOTES
Patient spoke with her childrens father and he agreed to watch the patients children while she goes to a psych facility.

## 2017-08-01 NOTE — ED TRIAGE NOTES
"Pt reports being sent to ER by OBGYN for post-partum depression. Currently 7 weeks post partum. Denies SI, HI. Denies any stressful circumstances at home, states "I think it's hormonal." Reports taking Zoloft x 1 week. Pt was seen at OBGYN's office 4 days ago, states could not come immediately due to having to make arrangements for .   "

## 2017-08-01 NOTE — ED NOTES
Patient belongings:    1 pair of grey pants  1 pair of tennis  1 bra  1 shirt  1 cell phone  1 drivers license  $6  1 [employer]SI debit card  1 insurance card

## 2017-08-01 NOTE — ED NOTES
Jonathon at [facility] contacted in attempting to reach Dr. Spears.  Jonathon stated that he will try and get in touch with Tory and have him call back.

## 2017-08-01 NOTE — ED NOTES
Pt resting quietly in stretcher, no family present. Awaiting lab results, will continue to monitor. NAD. Pt remains calm and cooperative.

## 2017-08-02 LAB
BACTERIA UR CULT: NORMAL
BACTERIA UR CULT: NORMAL

## 2017-08-02 NOTE — ED NOTES
Pt resting quietly in stretcher, ED tech Keke at bedside for 1:1 observation. Awaiting ready bed for psych facility to give report. Pt calm, cooperative, in NAD.

## 2017-08-08 ENCOUNTER — PATIENT MESSAGE (OUTPATIENT)
Dept: OBSTETRICS AND GYNECOLOGY | Facility: CLINIC | Age: 32
End: 2017-08-08

## 2017-08-17 ENCOUNTER — POSTPARTUM VISIT (OUTPATIENT)
Dept: OBSTETRICS AND GYNECOLOGY | Facility: CLINIC | Age: 32
End: 2017-08-17
Payer: COMMERCIAL

## 2017-08-17 ENCOUNTER — PATIENT MESSAGE (OUTPATIENT)
Dept: OBSTETRICS AND GYNECOLOGY | Facility: CLINIC | Age: 32
End: 2017-08-17

## 2017-08-17 ENCOUNTER — TELEPHONE (OUTPATIENT)
Dept: OBSTETRICS AND GYNECOLOGY | Facility: CLINIC | Age: 32
End: 2017-08-17

## 2017-08-17 VITALS
DIASTOLIC BLOOD PRESSURE: 84 MMHG | BODY MASS INDEX: 23.83 KG/M2 | HEIGHT: 65 IN | WEIGHT: 143.06 LBS | SYSTOLIC BLOOD PRESSURE: 122 MMHG

## 2017-08-17 PROCEDURE — 99999 PR PBB SHADOW E&M-EST. PATIENT-LVL II: CPT | Mod: PBBFAC,,,

## 2017-08-17 PROCEDURE — 99213 OFFICE O/P EST LOW 20 MIN: CPT | Mod: S$GLB,,, | Performed by: NURSE PRACTITIONER

## 2017-08-17 PROCEDURE — 3008F BODY MASS INDEX DOCD: CPT | Mod: S$GLB,,, | Performed by: NURSE PRACTITIONER

## 2017-08-17 RX ORDER — SERTRALINE HYDROCHLORIDE 50 MG/1
150 TABLET, FILM COATED ORAL DAILY
Qty: 30 TABLET | Refills: 6 | Status: SHIPPED | OUTPATIENT
Start: 2017-08-17 | End: 2017-09-07 | Stop reason: SDUPTHER

## 2017-08-17 NOTE — TELEPHONE ENCOUNTER
Contacted pt to give her instructions for her upcoming appointments with counseling and psych. No answer. Left VM for call back and left detailed information on VM for appointments. Sent message in [facility] as well.

## 2017-08-17 NOTE — LETTER
August 17, 2017    Sarah Lee  [address]  [address]  [place] [address]              [facility] [address]  [address]  [facility] [facility] [address]  Phone: [phone]  Fax: [phone]    To Whom It May Concern:    Ms. Lee is still under our care concerning complications in the postpartum period. As of today (subject to change) her return to work day is estimated as Monday August 28, 2017. Please call with any questions or concerns.         Sincerely,    Tameka Dennis, NP

## 2017-08-17 NOTE — PROGRESS NOTES
"  CC: PP depression    HPI: Pt is a 32 y.o.  female who presents for a f/u for worsening PP depression. Pt is currently on zoloft 100 mg PO daily. She does not think it is helping. She has been seeing Dr. Teran (last saw 17) who has been attempting to get her in with psych. She went to the ED on 17 where she was then admitted to a mental hospital for one week. She felt better during her stay since she did not have to deal with the kids or anyone else at the time. States things went back to how they were prior to hospitalization once she got home. Reports episodes of anger, mood swings, and crying spells. Denies any SI/HI or thoughts of wanting to harm her children. The FOB of her infant supports the baby financially, but does not help with . Both of her parents are . She does have siblings but they are busy with work during the day. Feels "overwhelmed". Older son is in school during the day. She was seeing a therapist in [place] once a week prior to hospitalization-no longer going but is interested in seeing someone else. She does feel safe to go home with her children and to properly take care of them.     Having trouble with her job. She states some of her problems now is anxiety about bills and her job. She is not currently working. Dr. Teran had sent her work a letter for extended leave, but her work has been calling her every day to see when she is coming back. She is no longer getting paid, which is putting some strain on her financially. Requesting a new letter today for her work.    -Zoloft increased to 100 mg per Dr Teran on 17  -pt went to ED by herself on 17  -admitted as an inpatient at [facility] on [address] for 1 week  -Zoloft increased to 150 mg today    ROS:  GENERAL: depressed, mood swings Denies fever or chills.   SKIN: Denies rash or lesions.   HEAD: Denies head injury or headache.   NODES: Denies enlarged lymph nodes.   CHEST: Denies " chest pain or shortness of breath.   CARDIOVASCULAR: Denies palpitations or left sided chest pain.   ABDOMEN: No abdominal pain, constipation, diarrhea, nausea, vomiting or rectal bleeding.   URINARY: No dysuria, hematuria, or burning on urination.  REPRODUCTIVE: See HPI.   BREASTS: Denies pain, lumps, or nipple discharge.   HEMATOLOGIC: No easy bruisability or excessive bleeding.   MUSCULOSKELETAL: Denies joint pain or swelling.   NEUROLOGIC: Denies syncope or weakness.   PSYCHIATRIC: Denies depression, anxiety or mood swings.    PE:   APPEARANCE: Well nourished, well developed, Black or  female in no acute distress. + depressed mood, cries easily  PELVIC: deferred    Diagnosis:  1. Postpartum depression    Plan:     Orders Placed This Encounter    sertraline (ZOLOFT) 50 MG tablet       -Zoloft increased to 150 mg today  -Follow-up with Dr. Teran next week  -reached out to , Kavita Cabrera to assist in appts  -psych appt scheduled for 9/7/17 @ 0900 with Chang De[facility]on [facility]  -gave crisis line # to call [phone]  -set up with new therapist for counseling, appt on 8/18/17 @ 1pm with Dr. Kailey Florence in [facility]  -new letter given to pt for her work    -Strict precaution to go straight to nearest ED with any SI/HI towards herself, children, or anyone else.

## 2017-08-17 NOTE — TELEPHONE ENCOUNTER
Called patient. Patient stated that she must be seen today. Patient advised that Dr. Teran is out and asked if it was ok for her to see the Nurse Practitioner. Patient stated that if fine because she is familiar with her already. Patient advised to come in as early as possible. Patient stated that she did not have a  and needed to bring the baby with her. Advised patient that it is fine to bring the baby. Patient scheduled for urgent care. Patient given instructions on where the new urgent care is. Patient verbalized understanding.

## 2017-08-17 NOTE — TELEPHONE ENCOUNTER
----- Message from Krys Eng sent at 8/17/2017  8:35 AM CDT -----  Contact: self  Pt delivered on 06/08/17, pt states her post partum depression is getting worse, pt canbe reached at [phone].

## 2017-08-18 ENCOUNTER — TELEPHONE (OUTPATIENT)
Dept: OBSTETRICS AND GYNECOLOGY | Facility: CLINIC | Age: 32
End: 2017-08-18

## 2017-08-18 NOTE — TELEPHONE ENCOUNTER
----- Message from Tameka Dennis NP sent at 8/17/2017 10:11 AM CDT -----  Regarding: needs 1 week f/u with Jeffry####  Hello,     This pt has severe PP depression and was seen by me today in urgent. I increased her zoloft today, but she needs to f/u in 1 week and needs to be seen by Dr. Teran herself. Please make appt-does not matter location.    Thanks  Tameka

## 2017-08-18 NOTE — TELEPHONE ENCOUNTER
Patient scheduled for postpartum depression follow up. Patient scheduled for Aug 23, 2017. Patient verbalized understanding.

## 2017-08-23 ENCOUNTER — POSTPARTUM VISIT (OUTPATIENT)
Dept: OBSTETRICS AND GYNECOLOGY | Facility: CLINIC | Age: 32
End: 2017-08-23
Payer: COMMERCIAL

## 2017-08-23 VITALS
WEIGHT: 144.81 LBS | HEIGHT: 64 IN | SYSTOLIC BLOOD PRESSURE: 102 MMHG | BODY MASS INDEX: 24.72 KG/M2 | DIASTOLIC BLOOD PRESSURE: 72 MMHG

## 2017-08-23 PROCEDURE — 99999 PR PBB SHADOW E&M-EST. PATIENT-LVL III: CPT | Mod: PBBFAC,,, | Performed by: OBSTETRICS & GYNECOLOGY

## 2017-08-23 PROCEDURE — 99214 OFFICE O/P EST MOD 30 MIN: CPT | Mod: S$GLB,,, | Performed by: OBSTETRICS & GYNECOLOGY

## 2017-08-23 PROCEDURE — 3074F SYST BP LT 130 MM HG: CPT | Mod: S$GLB,,, | Performed by: OBSTETRICS & GYNECOLOGY

## 2017-08-23 PROCEDURE — 3008F BODY MASS INDEX DOCD: CPT | Mod: S$GLB,,, | Performed by: OBSTETRICS & GYNECOLOGY

## 2017-08-23 PROCEDURE — 3078F DIAST BP <80 MM HG: CPT | Mod: S$GLB,,, | Performed by: OBSTETRICS & GYNECOLOGY

## 2017-08-23 NOTE — LETTER
August 23, 2017    Sarah Lee  [address]  [address]  [facility] [facility] [address]         [facility] [address]  [address]  [place] [address]  Phone: [phone]  Fax: [phone] August 23, 2017     Patient: Sarah Lee   YOB: 1985   Date of Visit: 8/23/2017       To Whom It May Concern:    It is my medical opinion that Sarah Lee may return to work on 9-.    If you have any questions or concerns, please don't hesitate to call.    Sincerely,        Vicki Teran MD

## 2017-08-23 NOTE — PROGRESS NOTES
" Obstetrics and Gynecology         CC: PP depression     HPI: Pt is a 32 y.o.  female who presents for a f/u for STABLE  PP depression. Pt is currently on zoloft 150 mg PO daily. She does not think it is helping. She has been me multiple times for the same sx, has been working with therapists and has follow up with psych. She went to the ED on 17 where she was then admitted to a mental hospital for one week. She felt better during her stay since she did not have to deal with the kids or anyone else at the time. States things went back to how they were prior to hospitalization once she got home. Reports episodes of anger, mood swings, and crying spells. Denies any SI/HI or thoughts of wanting to harm her children. The FOB of her infant supports the baby financially, but does not help with . Both of her parents are . She does have siblings but they are busy with work during the day. Feels "overwhelmed". Older son is in school during the day. She was seeing a therapist in [place] once a week prior to hospitalization-no longer going but is interested in seeing someone else. She does feel safe to go home with her children and to properly take care of them.      Having trouble with her job. She states some of her problems now is anxiety about bills and her job. She is not currently working.  Extended leave set up for about four more weeks from now. SHe has follow up with psych and therpy.  Number provided for "Fussy baby".         -admitted as an inpatient at [facility] [place] [facility] for 1 week  -Zoloft increased to 150 mg today     ROS:  GENERAL: depressed, mood swings Denies fever or chills.   SKIN: Denies rash or lesions.   HEAD: Denies head injury or headache.   NODES: Denies enlarged lymph nodes.   CHEST: Denies chest pain or shortness of breath.   CARDIOVASCULAR: Denies palpitations or left sided chest pain.   ABDOMEN: No abdominal pain, constipation, diarrhea, nausea, " vomiting or rectal bleeding.   URINARY: No dysuria, hematuria, or burning on urination.  REPRODUCTIVE: See HPI.   BREASTS: Denies pain, lumps, or nipple discharge.   HEMATOLOGIC: No easy bruisability or excessive bleeding.   MUSCULOSKELETAL: Denies joint pain or swelling.   NEUROLOGIC: Denies syncope or weakness.   PSYCHIATRIC: Denies depression, anxiety or mood swings.     PE:   APPEARANCE: Well nourished, well developed, Black or  female in no acute distress. + depressed mood, cries easily  PELVIC: deferred     Diagnosis:  1. Postpartum depression     Plan:          Orders Placed This Encounter             -Zoloft increased to 150 mg today  -Follow-up schedule with psych and therapsit  -reached out to , Kavita Cabrera to assist in appts  -psych appt scheduled for 9/7/17 @ 0900 with Chang Deleon NP- [facility] psych dept (4th floor [facility])  -gave crisis line # to call [phone]       -Strict precaution to go straight to nearest ED with any SI/HI towards herself, children, or anyone else.

## 2017-09-07 ENCOUNTER — OFFICE VISIT (OUTPATIENT)
Dept: PSYCHIATRY | Facility: CLINIC | Age: 32
End: 2017-09-07
Payer: COMMERCIAL

## 2017-09-07 VITALS
SYSTOLIC BLOOD PRESSURE: 126 MMHG | HEIGHT: 64 IN | HEART RATE: 95 BPM | WEIGHT: 147 LBS | BODY MASS INDEX: 25.1 KG/M2 | DIASTOLIC BLOOD PRESSURE: 83 MMHG

## 2017-09-07 DIAGNOSIS — F41.8 POSTPARTUM ANXIETY: ICD-10-CM

## 2017-09-07 DIAGNOSIS — G47.00 INSOMNIA DISORDER, WITH NON-SLEEP DISORDER MENTAL COMORBIDITY: ICD-10-CM

## 2017-09-07 DIAGNOSIS — F51.5 NIGHTMARES: ICD-10-CM

## 2017-09-07 PROCEDURE — 3079F DIAST BP 80-89 MM HG: CPT | Mod: S$GLB,,, | Performed by: NURSE PRACTITIONER

## 2017-09-07 PROCEDURE — 99999 PR PBB SHADOW E&M-EST. PATIENT-LVL II: CPT | Mod: PBBFAC,,, | Performed by: NURSE PRACTITIONER

## 2017-09-07 PROCEDURE — 3008F BODY MASS INDEX DOCD: CPT | Mod: S$GLB,,, | Performed by: NURSE PRACTITIONER

## 2017-09-07 PROCEDURE — 3074F SYST BP LT 130 MM HG: CPT | Mod: S$GLB,,, | Performed by: NURSE PRACTITIONER

## 2017-09-07 PROCEDURE — 99215 OFFICE O/P EST HI 40 MIN: CPT | Mod: S$GLB,,, | Performed by: NURSE PRACTITIONER

## 2017-09-07 RX ORDER — LAMOTRIGINE 25 MG/1
25 TABLET ORAL NIGHTLY
Qty: 30 TABLET | Refills: 3 | Status: SHIPPED | OUTPATIENT
Start: 2017-09-07 | End: 2017-10-25 | Stop reason: SDUPTHER

## 2017-09-07 RX ORDER — SERTRALINE HYDROCHLORIDE 100 MG/1
200 TABLET, FILM COATED ORAL DAILY
Qty: 60 TABLET | Refills: 3 | Status: SHIPPED | OUTPATIENT
Start: 2017-09-07 | End: 2017-10-25

## 2017-09-07 RX ORDER — CLONAZEPAM 0.5 MG/1
0.5 TABLET ORAL DAILY PRN
Qty: 30 TABLET | Refills: 3 | Status: SHIPPED | OUTPATIENT
Start: 2017-09-07 | End: 2017-10-25 | Stop reason: SDUPTHER

## 2017-09-07 RX ORDER — PRAZOSIN HYDROCHLORIDE 1 MG/1
1 CAPSULE ORAL NIGHTLY
Qty: 30 CAPSULE | Refills: 3 | Status: SHIPPED | OUTPATIENT
Start: 2017-09-07 | End: 2017-10-25 | Stop reason: SDUPTHER

## 2017-09-07 NOTE — PROGRESS NOTES
"Outpatient Psychiatry Initial Visit (MD/NP)    2017    Sarah Lee, a 32 y.o. female, presenting for initial evaluation visit. Met with patient.    Reason for Encounter: Referral from Dr. Dennis (OBGYN). Patient complains of post partum depression.    Note from Dr. Teran (OBGYN) on 17:   Pt is a 32 y.o.  female who presents for a f/u for STABLE  PP depression. Pt is currently on zoloft 150 mg PO daily. She does not think it is helping. She has been me multiple times for the same sx, has been working with therapists and has follow up with psych. She went to the ED on 17 where she was then admitted to a mental hospital for one week. She felt better during her stay since she did not have to deal with the kids or anyone else at the time. States things went back to how they were prior to hospitalization once she got home. Reports episodes of anger, mood swings, and crying spells. Denies any SI/HI or thoughts of wanting to harm her children. The FOB of her infant supports the baby financially, but does not help with . Both of her parents are . She does have siblings but they are busy with work during the day. Feels "overwhelmed". Older son is in school during the day. She was seeing a therapist in [place] once a week prior to hospitalization-no longer going but is interested in seeing someone else. She does feel safe to go home with her children and to properly take care of them.      Having trouble with her job. She states some of her problems now is anxiety about bills and her job. She is not currently working.  Extended leave set up for about four more weeks from now. SHe has follow up with psych and therpy.  Number provided for "Fussy baby".       -admitted as an inpatient at [facility] [place] on [facility] for 1 week  -Zoloft increased to 150 mg today    History of Present Illness:    Pt presents for evaluation and treatment of PP depression.  Onset soon after " "delivery of her son on 17. Pt denies past history of anxiety, depression, mood disorder, or PPD from her first child birthed 4 years ago.  Pt currently on Zoloft 150 mg daily and has been taking this medication for over a month.  Pt reports feeling mild improvement in mood without complaint of side-effects.  Currently endorses chronic symptoms of: insomnia, lability of sadness and anger, appetite dysregulated, social withdrawal, excessive worrying, irritability, and random crying spells.  Denies suicidal thoughts.  Pt reports that her mood changes and unaligned with her normal mood and personality.      Pt denies use of tobacco, alcohol, or elicit drugs.  Both of her parents are .  Mother  in  and father in .  Pt is engaged to her children's father but they live separately.  Pt has very limited support network.  Pt identified her main psychosocial stressors as, "not having support and my job calling me all the time to see when I'm coming back".  Pt was inpatient for a week at [facility] [place] and she reports having frequent nightmares about her experience of being inpatient.  Pt states that nothing bad happened to her during the hospital stay, but she was scared of the psychotic patients on the unit.        Review Of Systems:     GENERAL:  No weight gain or loss  SKIN:  No rashes or lacerations  HEAD:  No headaches  EYES:  No exophthalmos, jaundice or blindness  EARS:  No dizziness, tinnitus or hearing loss  NOSE:  No changes in smell  MOUTH & THROAT:  No dyskinetic movements or obvious goiter  CHEST:  No shortness of breath, hyperventilation or cough  CARDIOVASCULAR:  No tachycardia or chest pain  ABDOMEN:  No nausea, vomiting, pain, constipation or diarrhea  URINARY:  No frequency, dysuria or sexual dysfunction  ENDOCRINE:  No polydipsia, polyuria  MUSCULOSKELETAL:  No pain or stiffness of the joints  NEUROLOGIC:  No weakness, sensory changes, seizures, confusion, memory loss, tremor " "or other abnormal movements      Current Evaluation:     Nutritional Screening: Considering the patient's height and weight, medications, medical history and preferences, should a referral be made to the dietitian? no    Constitutional  Vitals:  Most recent vital signs, dated less than 90 days prior to this appointment, were reviewed.  Vitals:    09/07/17 0909   BP: 126/83   Pulse: 95   Weight: 66.7 kg (147 lb)   Height: 5' 4" (1.626 m)        General:  unremarkable, age appropriate     Musculoskeletal  Muscle Strength/Tone:  no tremor, no tic   Gait & Station:  non-ataxic     Psychiatric  Speech:  no latency; no press   Mood & Affect:  steady  congruent and appropriate   Thought Process:  normal and logical   Associations:  intact   Thought Content:  normal, no suicidality, no homicidality, delusions, or paranoia   Insight:  has awareness of illness   Judgement: behavior is adequate to circumstances, age appropriate   Orientation:  grossly intact, person, place, situation   Memory: intact for content of interview, able to remember recent events- yes, able to remember remote events- yes   Language: grossly intact   Attention Span & Concentration:  able to focus   Fund of Knowledge:  intact and appropriate to age and level of education, familiar with aspects of current personal life       Relevant Elements of Neurological Exam: normal gait    Functioning in Relationships:  Spouse/partner: in relationship for 4 years and recently engaged.  Reports he is financially supportive.  Peers: limited  Employers: currently on [employer] from [employer].    Laboratory Data  No visits with results within 1 Month(s) from this visit.   Latest known visit with results is:   Admission on 08/01/2017, Discharged on 08/02/2017   Component Date Value Ref Range Status    WBC 08/01/2017 5.89  3.90 - 12.70 K/uL Final    RBC 08/01/2017 5.01  4.00 - 5.40 M/uL Final    Hemoglobin 08/01/2017 12.2  12.0 - 16.0 g/dL Final    Hematocrit 08/01/2017 37.3 "  37.0 - 48.5 % Final    MCV 08/01/2017 75* 82 - 98 fL Final    MCH 08/01/2017 24.4* 27.0 - 31.0 pg Final    MCHC 08/01/2017 32.7  32.0 - 36.0 g/dL Final    RDW 08/01/2017 14.8* 11.5 - 14.5 % Final    Platelets 08/01/2017 264  150 - 350 K/uL Final    MPV 08/01/2017 9.0* 9.2 - 12.9 fL Final    Gran # 08/01/2017 4.1  1.8 - 7.7 K/uL Final    Lymph # 08/01/2017 1.3  1.0 - 4.8 K/uL Final    Mono # 08/01/2017 0.4  0.3 - 1.0 K/uL Final    Eos # 08/01/2017 0.1  0.0 - 0.5 K/uL Final    Baso # 08/01/2017 0.03  0.00 - 0.20 K/uL Final    Gran% 08/01/2017 69.6  38.0 - 73.0 % Final    Lymph% 08/01/2017 21.6  18.0 - 48.0 % Final    Mono% 08/01/2017 7.1  4.0 - 15.0 % Final    Eosinophil% 08/01/2017 1.2  0.0 - 8.0 % Final    Basophil% 08/01/2017 0.5  0.0 - 1.9 % Final    Platelet Estimate 08/01/2017 Appears normal   Final    Aniso 08/01/2017 Slight   Final    Differential Method 08/01/2017 Automated   Final    Sodium 08/01/2017 140  136 - 145 mmol/L Final    Potassium 08/01/2017 3.9  3.5 - 5.1 mmol/L Final    Chloride 08/01/2017 103  95 - 110 mmol/L Final    CO2 08/01/2017 27  23 - 29 mmol/L Final    Glucose 08/01/2017 103  70 - 110 mg/dL Final    BUN, Bld 08/01/2017 10  6 - 20 mg/dL Final    Creatinine 08/01/2017 0.7  0.5 - 1.4 mg/dL Final    Calcium 08/01/2017 9.4  8.7 - 10.5 mg/dL Final    Total Protein 08/01/2017 7.9  6.0 - 8.4 g/dL Final    Albumin 08/01/2017 4.0  3.5 - 5.2 g/dL Final    Total Bilirubin 08/01/2017 0.8  0.1 - 1.0 mg/dL Final    Alkaline Phosphatase 08/01/2017 110  55 - 135 U/L Final    AST 08/01/2017 21  10 - 40 U/L Final    ALT 08/01/2017 22  10 - 44 U/L Final    Anion Gap 08/01/2017 10  8 - 16 mmol/L Final    eGFR if African American 08/01/2017 >60  >60 mL/min/1.73 m^2 Final    eGFR if non African American 08/01/2017 >60  >60 mL/min/1.73 m^2 Final    TSH 08/01/2017 0.665  0.400 - 4.000 uIU/mL Final    Specimen UA 08/01/2017 Urine, Clean Catch   Final    Color, UA  08/01/2017 Yellow  Yellow, Straw, Purnima Final    Appearance, UA 08/01/2017 Clear  Clear Final    pH, UA 08/01/2017 6.0  5.0 - 8.0 Final    Specific Gravity, UA 08/01/2017 1.025  1.005 - 1.030 Final    Protein, UA 08/01/2017 Negative  Negative Final    Glucose, UA 08/01/2017 Negative  Negative Final    Ketones, UA 08/01/2017 Negative  Negative Final    Bilirubin (UA) 08/01/2017 Negative  Negative Final    Occult Blood UA 08/01/2017 Trace* Negative Final    Nitrite, UA 08/01/2017 Negative  Negative Final    Urobilinogen, UA 08/01/2017 Negative  <2.0 EU/dL Final    Leukocytes, UA 08/01/2017 1+* Negative Final    Alcohol, Medical, Serum 08/01/2017 <10  <10 mg/dL Final    Acetaminophen (Tylenol), Serum 08/01/2017 <3.0* 10.0 - 20.0 ug/mL Final    POC Preg Test, Ur 08/01/2017 Negative  Negative Final     Acceptable 08/01/2017 Yes   Final    RBC, UA 08/01/2017 5* 0 - 4 /hpf Final    WBC, UA 08/01/2017 25* 0 - 5 /hpf Final    WBC Clumps, UA 08/01/2017 Rare  None-Rare Final    Bacteria, UA 08/01/2017 Few* None-Occ /hpf Final    Squam Epithel, UA 08/01/2017 1  /hpf Final    Hyaline Casts, UA 08/01/2017 4* 0-1/lpf /lpf Final    Granular Casts, UA 08/01/2017 1* None /lpf Final    Microscopic Comment 08/01/2017 SEE COMMENT   Final    Benzodiazepines 08/01/2017 Negative   Final    Methadone metabolites 08/01/2017 Negative   Final    Cocaine (Metab.) 08/01/2017 Negative   Final    Opiate Scrn, Ur 08/01/2017 Negative   Final    Barbiturate Screen, Ur 08/01/2017 Negative   Final    Amphetamine Screen, Ur 08/01/2017 Negative   Final    THC 08/01/2017 Negative   Final    Phencyclidine 08/01/2017 Negative   Final    Creatinine, Random Ur 08/01/2017 150.1  15.0 - 325.0 mg/dL Final    Toxicology Information 08/01/2017 SEE COMMENT   Final         Medications  Outpatient Encounter Prescriptions as of 9/7/2017   Medication Sig Dispense Refill    clonazePAM (KLONOPIN) 0.5 MG tablet Take 1  tablet (0.5 mg total) by mouth daily as needed for Anxiety. 30 tablet 3    lamotrigine (LAMICTAL) 25 MG tablet Take 1 tablet (25 mg total) by mouth every evening. 30 tablet 3    prazosin (MINIPRESS) 1 MG Cap Take 1 capsule (1 mg total) by mouth every evening. For nightmares 30 capsule 3    sertraline (ZOLOFT) 100 MG tablet Take 2 tablets (200 mg total) by mouth once daily. 60 tablet 3    [DISCONTINUED] sertraline (ZOLOFT) 50 MG tablet Take 3 tablets (150 mg total) by mouth once daily. 30 tablet 6     No facility-administered encounter medications on file as of 9/7/2017.            Assessment - Diagnosis - Goals:     Impression:       ICD-10-CM ICD-9-CM   1. Postpartum depression F53 648.44     311   2. Postpartum anxiety O99.345 648.44    F41.1 300.00   3. Insomnia disorder, with non-sleep disorder mental comorbidity G47.00 780.52   4. Nightmares F51.5 307.47       Strengths and Liabilities: Strength: Patient accepts guidance/feedback, Strength: Patient is expressive/articulate., Strength: Patient is intelligent., Strength: Patient is motivated for change., Liability: Patient has no suport network., Liability: Patient lacks coping skills.    Treatment Goals:  Specify outcomes written in observable, behavioral terms:   Anxiety: reducing negative automatic thoughts, reducing physical symptoms of anxiety and reducing time spent worrying (<30 minutes/day)  Depression: increasing energy, increasing interest in usual activities, increasing motivation, increasing self-reward for positive behaviors (one/day), increasing self-reward for positive thoughts (one/day) and increasing social contacts (three/week)    Treatment Plan/Recommendations:   · Medication Management: The risks and benefits of medication were discussed with the patient.  · The treatment plan and follow up plan were reviewed with the patient.   · Increase Zoloft 200 mg po daily  · Start Klonopin 0.5 mg po daily PRN agitation  · Start Lamictal 25 mg po  daily   · Start Prazosin 1 mg po  bedtime (nightmares)  · Provided work excuse letter  · Encouraged pt to continue individual therapy with her private therapist.  · Counseling focused on medication teaching, educating about post partum depression, building adaptive coping skills, parenting skills, and expanding her social support network.        Return to Clinic: 1 month    Counseling time: 36 minutes  Total time: 60 minutes  Consulting clinician was informed of the encounter and consult note.

## 2017-09-21 ENCOUNTER — PATIENT MESSAGE (OUTPATIENT)
Dept: PSYCHIATRY | Facility: CLINIC | Age: 32
End: 2017-09-21

## 2017-09-25 ENCOUNTER — TELEPHONE (OUTPATIENT)
Dept: PSYCHIATRY | Facility: HOSPITAL | Age: 32
End: 2017-09-25

## 2017-09-25 DIAGNOSIS — F41.8 POSTPARTUM ANXIETY: ICD-10-CM

## 2017-09-25 NOTE — TELEPHONE ENCOUNTER
Called pt in response to message.  Pt continues to report anxiety symptoms.  Tolerated medication changes.  Will place referral for individual psychotherapy to social work.

## 2017-09-26 ENCOUNTER — PATIENT MESSAGE (OUTPATIENT)
Dept: PSYCHIATRY | Facility: CLINIC | Age: 32
End: 2017-09-26

## 2017-10-25 ENCOUNTER — OFFICE VISIT (OUTPATIENT)
Dept: PSYCHIATRY | Facility: CLINIC | Age: 32
End: 2017-10-25
Payer: COMMERCIAL

## 2017-10-25 VITALS
WEIGHT: 144.38 LBS | SYSTOLIC BLOOD PRESSURE: 139 MMHG | BODY MASS INDEX: 24.65 KG/M2 | HEART RATE: 107 BPM | HEIGHT: 64 IN | DIASTOLIC BLOOD PRESSURE: 75 MMHG

## 2017-10-25 DIAGNOSIS — F51.5 NIGHTMARES: ICD-10-CM

## 2017-10-25 DIAGNOSIS — F41.8 POSTPARTUM ANXIETY: ICD-10-CM

## 2017-10-25 PROCEDURE — 99214 OFFICE O/P EST MOD 30 MIN: CPT | Mod: S$GLB,,, | Performed by: NURSE PRACTITIONER

## 2017-10-25 PROCEDURE — 99999 PR PBB SHADOW E&M-EST. PATIENT-LVL II: CPT | Mod: PBBFAC,,, | Performed by: NURSE PRACTITIONER

## 2017-10-25 PROCEDURE — 90833 PSYTX W PT W E/M 30 MIN: CPT | Mod: S$GLB,,, | Performed by: NURSE PRACTITIONER

## 2017-10-25 RX ORDER — ESCITALOPRAM OXALATE 10 MG/1
10 TABLET ORAL DAILY
Qty: 30 TABLET | Refills: 5 | Status: SHIPPED | OUTPATIENT
Start: 2017-10-25 | End: 2018-01-02 | Stop reason: SDUPTHER

## 2017-10-25 RX ORDER — LAMOTRIGINE 25 MG/1
50 TABLET ORAL NIGHTLY
Qty: 60 TABLET | Refills: 5 | Status: SHIPPED | OUTPATIENT
Start: 2017-10-25 | End: 2018-01-02 | Stop reason: SDUPTHER

## 2017-10-25 RX ORDER — PRAZOSIN HYDROCHLORIDE 2 MG/1
2 CAPSULE ORAL NIGHTLY
Qty: 30 CAPSULE | Refills: 5 | Status: SHIPPED | OUTPATIENT
Start: 2017-10-25 | End: 2018-01-02 | Stop reason: SDUPTHER

## 2017-10-25 RX ORDER — CLONAZEPAM 0.5 MG/1
TABLET ORAL
Qty: 90 TABLET | Refills: 5 | Status: SHIPPED | OUTPATIENT
Start: 2017-10-25 | End: 2018-01-02 | Stop reason: SDUPTHER

## 2017-10-26 NOTE — PROGRESS NOTES
Outpatient Psychiatry Follow-Up Visit (MD/NP)    10/25/2017    Clinical Status of Patient:  Outpatient (Ambulatory)    Chief Complaint:  Sarah Lee is a 32 y.o. female who presents today for follow-up of depression and anxiety.  Met with patient.      Interval History and Content of Current Session:  Interim Events/Subjective Report/Content of Current Session:     Current Psych Medication  · Zoloft 200 mg po daily  · Lamictal 25 mg po q hs  · Prazosin 1 mg po q hs  · Klonopin 0.5 mg po daily PRN    Pt reports Zoloft ineffective and possibly contributing to restlessness at night.  Pt stated that she feels dizzy and tired in the morning after taking all of her medications.  Apparently pt was unaware that many of her medications are to be taken at bedtime.  Pt also reports not bonding with her infant son.  Denies suicidal thoughts.  Discussed medication changes and printed a list of her medications with detailed instructions.  Pt acknowledged understanding of medication instructions.  Pt still unable to return to work.    Psychotherapy:  · Target symptoms: depression, anxiety   · Why chosen therapy is appropriate versus another modality: relevant to diagnosis  · Outcome monitoring methods: self-report, observation  · Therapeutic intervention type: insight oriented psychotherapy  · Topics discussed/themes: parenting issues, difficulty managing affect in interpersonal relationships, building skills sets for symptom management, symptom recognition  · The patient's response to the intervention is accepting. The patient's progress toward treatment goals is fair.   · Duration of intervention: 20 minutes.    Review of Systems   · PSYCHIATRIC: Pertinant items are noted in the narrative.  · CONSTITUTIONAL: No weight gain or loss.   · MUSCULOSKELETAL: No pain or stiffness of the joints.  · NEUROLOGIC: No weakness, sensory changes, seizures, confusion, memory loss, tremor or other abnormal movements.  · ENDOCRINE: No  "polydipsia or polyuria.  · INTEGUMENTARY: No rashes or lacerations.  · EYES: No exophthalmos, jaundice or blindness.  · ENT: No dizziness, tinnitus or hearing loss.  · RESPIRATORY: No shortness of breath.  · CARDIOVASCULAR: No tachycardia or chest pain.  · GASTROINTESTINAL: No nausea, vomiting, pain, constipation or diarrhea.  · GENITOURINARY: No frequency, dysuria or sexual dysfunction.  · HEMATOLOGIC/LYMPHATIC: No excessive bleeding, prolonged or excessive bleeding after dental extraction/injury.  · ALLERGIC/IMMUNOLOGIC: No allergic response to materials, foods or animals at this time.    Past Medical, Family and Social History: The patient's past medical, family and social history have been reviewed and updated as appropriate within the electronic medical record - see encounter notes.    Compliance: no    Side effects: None    Risk Parameters:  Patient reports no suicidal ideation  Patient reports no homicidal ideation  Patient reports no self-injurious behavior  Patient reports no violent behavior    Exam (detailed: at least 9 elements; comprehensive: all 15 elements)   Constitutional  Vitals:  Most recent vital signs, dated greater than 90 days prior to this appointment, were reviewed.   Vitals:    10/25/17 0855   BP: 139/75   Pulse: 107   Weight: 65.5 kg (144 lb 6.4 oz)   Height: 5' 4" (1.626 m)        General:  unremarkable, age appropriate     Musculoskeletal  Muscle Strength/Tone:  no tremor, no tic   Gait & Station:  non-ataxic     Psychiatric  Speech:  no latency; no press   Mood & Affect:  anxious  congruent and appropriate   Thought Process:  normal and logical   Associations:  intact   Thought Content:  normal, no suicidality, no homicidality, delusions, or paranoia   Insight:  intact   Judgement: behavior is adequate to circumstances   Orientation:  grossly intact   Memory: intact for content of interview   Language: grossly intact   Attention Span & Concentration:  able to focus   Fund of Knowledge:  " intact and appropriate to age and level of education     Assessment and Diagnosis   Status/Progress: Based on the examination today, the patient's problem(s) is/are failing to respond as expected to treatment.  New problems have not been presented today.   Co-morbidities and Lack of compliance are complicating management of the primary condition.  There are no active rule-out diagnoses for this patient at this time.     General Impression:       ICD-10-CM ICD-9-CM   1. Postpartum depression F53 648.44     311   2. Nightmares F51.5 307.47   3. Postpartum anxiety O99.345 648.44    F41.1 300.00       Intervention/Counseling/Treatment Plan   · Medication Management: The risks and benefits of medication were discussed with the patient.   · Start Lexapro 10 mg po daily  · Increase Lamictal to 50 mg po q hs  · Increase Klonopin 0.5 mg po q am and 1 mg po q hs  · Increase Prazosin 2 mg po q hs  · Reviewed medications with patient to improve compliance (pt was taking all her meds in the morning)  · Referral ordered for social work to individual therapy      Return to Clinic: 3 months

## 2017-11-06 ENCOUNTER — PATIENT MESSAGE (OUTPATIENT)
Dept: PSYCHIATRY | Facility: CLINIC | Age: 32
End: 2017-11-06

## 2017-11-15 ENCOUNTER — OFFICE VISIT (OUTPATIENT)
Dept: PSYCHIATRY | Facility: CLINIC | Age: 32
End: 2017-11-15
Payer: COMMERCIAL

## 2017-11-15 PROCEDURE — 90791 PSYCH DIAGNOSTIC EVALUATION: CPT | Mod: S$GLB,,, | Performed by: SOCIAL WORKER

## 2017-11-15 NOTE — PROGRESS NOTES
"Psychiatry Initial Visit (PhD/LCSW)  Diagnostic Interview - CPT 85984    Date: 11/15/2017    Site: [facility]    Referral source: Chang Arango    Clinical status of patient: Outpatient    Sarah Lee, a 32 y.o. female, for initial evaluation visit.  Met with patient.    Chief complaint/reason for encounter: depression    History of present illness:     Sarah reports that she is experiencing postpartum depression.  Symptoms include:  Crying, irritability, insomnia, inability to connect with the baby.  She reports that she did not have problems connecting with her first child, Master (5). She also reports conflict with her finace, Cielo, who she says is not helping with the baby, apart from offering financial support.        Pain: noncontributory    Symptoms:   · Mood: depressed mood, diminished interest, insomnia, fatigue, worthlessness/guilt, poor concentration, tearfulness and social isolation  · Anxiety: excessive anxiety/worry  · Substance abuse: denied  · Cognitive functioning: denied  · Health behaviors: noncontributory    Psychiatric history: prior inpatient treatment    Medical history: noncontributory    Family history of psychiatric illness: not known    Social history (marriage, employment, etc.):     Sarah grew up in [place]. Her father  in  of heart attack (right in front of her; fell on top of Master).  Her mother  of pancreatic cancer in  right in front of her as well. Her fiance's name is Cielo, and he is the father of both of her children.  They have been together 8 years.  They had a good relationship, but there has been conflict since Christian was born.  She and Cieol don't live together, and "he is no help, except financially."  Cielo is a landlord.  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 She says she has very little social support because "everyone works" but her sister, Kike (39), does visit her most " "evenings to help with the children.  She also has a brother, Toby (38) who lives in [place].    She was hospitalized for a week in August for postpartum depression.  She says that neither the hospitalization nor her medication have helped her.  She also says that her work was very stressful.  Christian was born at 36 weeks, and her ob/gyn said he was early due to work stressors.  Assessed for safety, and she denies homicidal ideation or intent to harm her children.  She says she is "trying to connect" with Christian, and she feels guilty that she cannot.  She feels that if Cielo helped more she might begin to connect, because she is resentful that Cielo is the one who really wanted the baby, but he isn't helping her with him.      Substance use:   Alcohol: none   Drugs: none   Tobacco: none   Caffeine: none  Current medications and drug reactions (include OTC, herbal): see medication list     Strengths and liabilities: Strength: Patient is expressive/articulate., Strength: Patient is motivated for change., Strength: Patient is physically healthy.    Current Evaluation:     Mental Status Exam:  General Appearance:  unremarkable, age appropriate   Speech: normal tone, normal rate, normal pitch, normal volume      Level of Cooperation: cooperative      Thought Processes: normal and logical   Mood: depressed      Thought Content: normal, no suicidality, no homicidality, delusions, or paranoia   Affect: blunted   Orientation: Oriented x3   Memory: recent >  intact   Attention Span & Concentration: intact   Fund of General Knowledge: intact and appropriate to age and level of education   Abstract Reasoning: not assessed   Judgment & Insight: fair     Language  intact     Diagnostic Impression - Plan:       ICD-10-CM ICD-9-CM   1. Postpartum depression F53 648.44     311       Plan:individual psychotherapy    Return to Clinic: 1 week    Length of Service (minutes): 45  "

## 2017-12-19 ENCOUNTER — PATIENT MESSAGE (OUTPATIENT)
Dept: PSYCHIATRY | Facility: CLINIC | Age: 32
End: 2017-12-19

## 2017-12-27 ENCOUNTER — PATIENT MESSAGE (OUTPATIENT)
Dept: PSYCHIATRY | Facility: CLINIC | Age: 32
End: 2017-12-27

## 2018-01-02 ENCOUNTER — OFFICE VISIT (OUTPATIENT)
Dept: PSYCHIATRY | Facility: CLINIC | Age: 33
End: 2018-01-02
Payer: MEDICAID

## 2018-01-02 ENCOUNTER — PATIENT MESSAGE (OUTPATIENT)
Dept: PSYCHIATRY | Facility: CLINIC | Age: 33
End: 2018-01-02

## 2018-01-02 VITALS
HEART RATE: 104 BPM | WEIGHT: 141 LBS | SYSTOLIC BLOOD PRESSURE: 125 MMHG | BODY MASS INDEX: 24.07 KG/M2 | HEIGHT: 64 IN | DIASTOLIC BLOOD PRESSURE: 75 MMHG

## 2018-01-02 DIAGNOSIS — F41.8 POSTPARTUM ANXIETY: ICD-10-CM

## 2018-01-02 DIAGNOSIS — G47.00 INSOMNIA DISORDER, WITH NON-SLEEP DISORDER MENTAL COMORBIDITY: ICD-10-CM

## 2018-01-02 DIAGNOSIS — F51.5 NIGHTMARES: ICD-10-CM

## 2018-01-02 DIAGNOSIS — F43.21 GRIEF: ICD-10-CM

## 2018-01-02 PROCEDURE — 90836 PSYTX W PT W E/M 45 MIN: CPT | Mod: SA,HB,S$PBB, | Performed by: NURSE PRACTITIONER

## 2018-01-02 PROCEDURE — 99999 PR PBB SHADOW E&M-EST. PATIENT-LVL II: CPT | Mod: PBBFAC,,, | Performed by: NURSE PRACTITIONER

## 2018-01-02 PROCEDURE — 99212 OFFICE O/P EST SF 10 MIN: CPT | Mod: PBBFAC | Performed by: NURSE PRACTITIONER

## 2018-01-02 PROCEDURE — 90836 PSYTX W PT W E/M 45 MIN: CPT | Mod: PBBFAC | Performed by: NURSE PRACTITIONER

## 2018-01-02 PROCEDURE — 99214 OFFICE O/P EST MOD 30 MIN: CPT | Mod: SA,HB,S$PBB, | Performed by: NURSE PRACTITIONER

## 2018-01-02 RX ORDER — CLONAZEPAM 1 MG/1
TABLET ORAL
Qty: 30 TABLET | Refills: 1 | Status: SHIPPED | OUTPATIENT
Start: 2018-01-02 | End: 2018-01-29 | Stop reason: SDUPTHER

## 2018-01-02 RX ORDER — PRAZOSIN HYDROCHLORIDE 2 MG/1
2 CAPSULE ORAL NIGHTLY
Qty: 30 CAPSULE | Refills: 5 | Status: SHIPPED | OUTPATIENT
Start: 2018-01-02 | End: 2018-01-29 | Stop reason: SDUPTHER

## 2018-01-02 RX ORDER — LAMOTRIGINE 100 MG/1
100 TABLET ORAL NIGHTLY
Qty: 30 TABLET | Refills: 2 | Status: SHIPPED | OUTPATIENT
Start: 2018-01-02 | End: 2018-01-29 | Stop reason: SDUPTHER

## 2018-01-02 RX ORDER — ESCITALOPRAM OXALATE 20 MG/1
20 TABLET ORAL DAILY
Qty: 30 TABLET | Refills: 2 | Status: SHIPPED | OUTPATIENT
Start: 2018-01-02 | End: 2018-01-29 | Stop reason: SDUPTHER

## 2018-01-02 NOTE — PROGRESS NOTES
"Outpatient Psychiatry Follow-Up Visit (MD/NP)    2018    Clinical Status of Patient:  Outpatient (Ambulatory)    Chief Complaint:  Sarah Lee is a 32 y.o. female who presents today for follow-up of depression and anxiety.  Met with patient.  Last visit was on 10/25/17.  Chart and  reviewed.    Interval History and Content of Current Session:  Current Psych Medication  · Lexapro 10 mg po daily  · Lamictal 50 mg po q hs  · Prazosin 2 mg po q hs  · Increase Klonopin 0.5 mg po q am and 1 mg po q hs    Pt was evaluated by social work on 11/15/17 for psychotherapy consultation with Trista Dubois LCSW.  Reviewed electronic record which recommended follow-up in 1 week.  I asked the patient why she has not follow-up since initial visit and she stated that she was told to bring her partner/BF and he refused to go with her.  I explained to her that she still should go for individual psychotherapy sessions and that it was not intended to be just couples counseling.  Pt apologized for misunderstanding and stated that she would schedule appointment ASAP.    During today's f/u evaluation she stated, "my anger is getting worse, I'm not bonding with my children, my grandmother  in my arms on Thanksgiving day, and I'm still crying all the time every day."   Pt currently endorses insomnia (average 2hr/night sleep), mood lability ranging from anger to dysphoria, appetite dysregulation, anhedonia, avolition, excessive worry/apprehension, and daily panic episodes (reports average 2 episodes per day).  Denies SI/HI and denies AH/VH.  Pt had both children present during evaluation.  Pt was holding her  (now 7 months old) in a non nurturing manner as if she was holding an object.  Pt denies physically hitting/spanking the children but admits to yelling and becoming easily and excessively frustrated with them.  Children appeared groomed and wearing clean clothes.  Pt reports feeling despondent and emotionally " "detached to her children.      Pt reports efficacy with Prazosin in controlling nightmares.  She denies noticeable improvement in mood from Lexapro and Lamictal.  Denies side effects.  Pt has been taking medications at appropriate time of day which was a problem addressed during last visit.  She failed on Zoloft and was switched to Lexapro.      Pt discussed recent situational stressors which are complicating treatment of symptoms.  She states that she avoids leaving her house and relies on her older sister, Desire, for social support.  Her sister helps her with cooking, cleaning, and grocery shopping.  Pt stated that her only attempt towards engaging with family/friends outside of her home was on Thanksgiving day.  She stated that they went to family's house for  and her grandmother .  Pt stated "she  in front of me".  During initial visit pt opened up about the death of her parents.  Mother  in  and father in .  Pt reports that her significant other has  from her due to current emotional state.  Pt also states that she has financial problems which stem from being out of work and not receiving short-term disability payments for the last few months.  Pt has defaulted on her bills and rent payment and may be facing eviction.      Discussed pt's available support network and resources.  She expressed a desire to get well so that she can return to work.  She stated that her children's father is available to assist in childcare responsibilities when she is capable of working.  Pt also has enrolled in Medicaid, W[employer], and food stamps.  We reviewed her disability claim documents and updated goals for treatment and anticipated return to work status.  Pt agreed to plan and acknowledged understanding of goals, recommendations, and importance of compliance.  Pt's deviation from previous goals/recommendations appear to be associated with lack of understanding and not from volitional " noncompliance.      New goals are to achieve stability < 3 months with anticipated return to work date before or on 4/02/18.  1) Pt will set appointment for psychotherapy follow-up with DESTINEE Wolf LCSW and attend individual sessions every 1-3 weeks.  2) Pt will follow up with me in 4-5 weeks.  3) Pt acknowledged understanding of medication adjustments.   4) Pt agreed to expand her social support network.  She stated a desire to connect with her local [employer].      Psychotherapy:  · Target symptoms: depression, anxiety , mood swings, grief  · Why chosen therapy is appropriate versus another modality: relevant to diagnosis  · Outcome monitoring methods: self-report, observation  · Therapeutic intervention type: insight oriented psychotherapy, behavior modifying psychotherapy  · Topics discussed/themes: parenting issues, difficulty managing affect in interpersonal relationships, building skills sets for symptom management, symptom recognition  · The patient's response to the intervention is accepting. The patient's progress toward treatment goals is fair.   · Duration of intervention: 40 minutes.    Review of Systems   · PSYCHIATRIC: Pertinant items are noted in the narrative.  · CONSTITUTIONAL: No weight gain or loss.   · MUSCULOSKELETAL: No pain or stiffness of the joints.  · NEUROLOGIC: No weakness, sensory changes, seizures, confusion, memory loss, tremor or other abnormal movements.  · ENDOCRINE: No polydipsia or polyuria.  · INTEGUMENTARY: No rashes or lacerations.  · EYES: No exophthalmos, jaundice or blindness.  · ENT: No dizziness, tinnitus or hearing loss.  · RESPIRATORY: No shortness of breath.  · CARDIOVASCULAR: No tachycardia or chest pain.  · GASTROINTESTINAL: No nausea, vomiting, pain, constipation or diarrhea.  · GENITOURINARY: No frequency, dysuria or sexual dysfunction.  · HEMATOLOGIC/LYMPHATIC: No excessive bleeding, prolonged or excessive bleeding after dental  "extraction/injury.  · ALLERGIC/IMMUNOLOGIC: No allergic response to materials, foods or animals at this time.    Past Medical, Family and Social History: The patient's past medical, family and social history have been reviewed and updated as appropriate within the electronic medical record - see encounter notes.    Compliance: no    Side effects: None    Risk Parameters:  Patient reports no suicidal ideation  Patient reports no homicidal ideation  Patient reports no self-injurious behavior  Patient reports no violent behavior    Exam (detailed: at least 9 elements; comprehensive: all 15 elements)   Constitutional  Vitals:  Most recent vital signs, dated greater than 90 days prior to this appointment, were reviewed.   Vitals:    01/02/18 1024   BP: 125/75   Pulse: 104   Weight: 64 kg (141 lb)   Height: 5' 4" (1.626 m)        General:  unremarkable, age appropriate     Musculoskeletal  Muscle Strength/Tone:  no tremor, no tic   Gait & Station:  non-ataxic     Psychiatric  Speech:  no latency; no press   Mood & Affect:  depressed, dysthymic, sad  sad   Thought Process:  normal and logical   Associations:  intact   Thought Content:  normal, no suicidality, no homicidality, delusions, or paranoia   Insight:  intact   Judgement: behavior is adequate to circumstances   Orientation:  grossly intact   Memory: intact for content of interview   Language: grossly intact   Attention Span & Concentration:  able to focus   Fund of Knowledge:  intact and appropriate to age and level of education     Assessment and Diagnosis   Status/Progress: Based on the examination today, the patient's problem(s) is/are failing to respond as expected to treatment.  New problems have been presented today (grief).   Co-morbidities and Lack of compliance are complicating management of the primary condition.  There are no active rule-out diagnoses for this patient at this time.     General Impression:       ICD-10-CM ICD-9-CM   1. Postpartum " depression F53 648.44     311   2. Postpartum anxiety O99.345 648.44    F41.1 300.00   3. Grief F43.20 309.0   4. Insomnia disorder, with non-sleep disorder mental comorbidity G47.00 780.52   5. Nightmares F51.5 307.47       Intervention/Counseling/Treatment Plan   · Medication Management: The risks and benefits of medication were discussed with the patient.   · Increase to Lexapro 20 mg po daily  · Increase Lamictal to 100 mg po q hs  · Decrease to Klonopin 1 mg po q hs  · Continue Prazosin 2 mg po q hs  · Reviewed medications with patient to improve compliance (pt was taking all her meds in the morning which was discovered on last visit)  · Continue individual therapy with Trista Dubois LCSW.  Pt instructed to set appointment ASAP and f/u every 1-3 weeks.  (pt set earliest available appt on 2/07/18)  · Completed and faxed disability claim to [employer]  · Pt encouraged to expand social support network.   · Pt agreed to updated tx plan and acknowledged understanding  · Reviewed emergency resources of suicide hotline 1(052) 019-XOMG, 366, and Emergency Dept in case of crisis or suicidal thoughts.      Return to Clinic: 1 month (appt set on 1/29/18)

## 2018-01-03 ENCOUNTER — PATIENT MESSAGE (OUTPATIENT)
Dept: PSYCHIATRY | Facility: CLINIC | Age: 33
End: 2018-01-03

## 2018-01-04 PROBLEM — F43.21 GRIEF: Status: ACTIVE | Noted: 2018-01-04

## 2018-01-29 ENCOUNTER — OFFICE VISIT (OUTPATIENT)
Dept: PSYCHIATRY | Facility: CLINIC | Age: 33
End: 2018-01-29
Payer: MEDICAID

## 2018-01-29 VITALS
HEART RATE: 100 BPM | BODY MASS INDEX: 24.59 KG/M2 | DIASTOLIC BLOOD PRESSURE: 69 MMHG | SYSTOLIC BLOOD PRESSURE: 123 MMHG | HEIGHT: 64 IN | WEIGHT: 144 LBS

## 2018-01-29 DIAGNOSIS — F43.21 GRIEF: ICD-10-CM

## 2018-01-29 DIAGNOSIS — F41.8 POSTPARTUM ANXIETY: ICD-10-CM

## 2018-01-29 DIAGNOSIS — G47.00 INSOMNIA DISORDER, WITH NON-SLEEP DISORDER MENTAL COMORBIDITY: ICD-10-CM

## 2018-01-29 DIAGNOSIS — F51.5 NIGHTMARES: ICD-10-CM

## 2018-01-29 PROCEDURE — 99213 OFFICE O/P EST LOW 20 MIN: CPT | Mod: SA,HB,S$PBB, | Performed by: NURSE PRACTITIONER

## 2018-01-29 PROCEDURE — 99213 OFFICE O/P EST LOW 20 MIN: CPT | Mod: PBBFAC | Performed by: NURSE PRACTITIONER

## 2018-01-29 PROCEDURE — 90833 PSYTX W PT W E/M 30 MIN: CPT | Mod: PBBFAC | Performed by: NURSE PRACTITIONER

## 2018-01-29 PROCEDURE — 90833 PSYTX W PT W E/M 30 MIN: CPT | Mod: SA,HB,S$PBB, | Performed by: NURSE PRACTITIONER

## 2018-01-29 PROCEDURE — 99999 PR PBB SHADOW E&M-EST. PATIENT-LVL III: CPT | Mod: PBBFAC,,, | Performed by: NURSE PRACTITIONER

## 2018-01-29 RX ORDER — ESCITALOPRAM OXALATE 20 MG/1
20 TABLET ORAL DAILY
Qty: 30 TABLET | Refills: 5 | Status: SHIPPED | OUTPATIENT
Start: 2018-01-29 | End: 2018-03-13 | Stop reason: SDUPTHER

## 2018-01-29 RX ORDER — LAMOTRIGINE 100 MG/1
100 TABLET ORAL NIGHTLY
Qty: 30 TABLET | Refills: 5 | Status: SHIPPED | OUTPATIENT
Start: 2018-01-29 | End: 2018-03-13 | Stop reason: SDUPTHER

## 2018-01-29 RX ORDER — PRAZOSIN HYDROCHLORIDE 2 MG/1
2 CAPSULE ORAL NIGHTLY
Qty: 30 CAPSULE | Refills: 5 | Status: SHIPPED | OUTPATIENT
Start: 2018-01-29 | End: 2018-03-13 | Stop reason: SDUPTHER

## 2018-01-29 RX ORDER — CLONAZEPAM 1 MG/1
TABLET ORAL
Qty: 30 TABLET | Refills: 3 | Status: SHIPPED | OUTPATIENT
Start: 2018-01-29 | End: 2018-03-13 | Stop reason: SDUPTHER

## 2018-01-29 RX ORDER — LAMOTRIGINE 25 MG/1
25 TABLET ORAL DAILY
Qty: 30 TABLET | Refills: 5 | Status: SHIPPED | OUTPATIENT
Start: 2018-01-29 | End: 2018-03-13 | Stop reason: DRUGHIGH

## 2018-01-29 NOTE — PROGRESS NOTES
Outpatient Psychiatry Follow-Up Visit (MD/NP)    2018    Clinical Status of Patient:  Outpatient (Ambulatory)    Chief Complaint:  Sarah Lee is a 32 y.o. female who presents today for follow-up of depression and anxiety.  Met with patient.  Last visit was on 18.  Chart reviewed.    Interval History and Content of Current Session:  Current Psych Medication  · Increase to Lexapro 20 mg po daily  · Increase Lamictal to 100 mg po q hs  · Decrease to Klonopin 1 mg po q hs  · Continue Prazosin 2 mg po q hs    Pt presents brighert affect.  Pt reports improved mood and anxiety since her last visit.  Responding well to increase of Lexapro and Lamictal.  Pt reports improved bonding with her children.  She reports trouble with sleep cycle.  Pt states that sometimes she oversleeps.  On three occassions she didn't wake up to bring her 6 y/o son to school bus. Pt goes to bed at 8 pm which is her child's bedtime.  Interrupted sleep pattern appear consistent with challenges of having a .  Educated pt on proper sleep hygiene.    Recommended [facility] program to assist pt in having structure in her day while obtaining coping skills training in group setting.  I offered pt a brochure and she agreed to call and schedule phone interview.     Updated goals to achieve stability < 3 months with anticipated return to work date before or on 18.  1) Pt will set appointment for psychotherapy follow-up with Trista Dubois LCSW, (scheduled for 18)  2) Pt will follow up with me in 4-5 weeks.  3) Pt acknowledged understanding of medication adjustments.   4) Pt agreed to expand her social support network.  She stated a desire to connect with her local [employer].    5) Pt will follow-up with BMU referral    Psychotherapy:  · Target symptoms: depression, anxiety , mood swings, grief  · Why chosen therapy is appropriate versus another modality: relevant to diagnosis  · Outcome monitoring methods: self-report,  observation  · Therapeutic intervention type: insight oriented psychotherapy, behavior modifying psychotherapy  · Topics discussed/themes: parenting issues, difficulty managing affect in interpersonal relationships, building skills sets for symptom management, symptom recognition  · The patient's response to the intervention is accepting. The patient's progress toward treatment goals is good.   · Duration of intervention: 20 minutes.    Review of Systems   · PSYCHIATRIC: Pertinant items are noted in the narrative.  · CONSTITUTIONAL: No weight gain or loss.   · MUSCULOSKELETAL: No pain or stiffness of the joints.  · NEUROLOGIC: No weakness, sensory changes, seizures, confusion, memory loss, tremor or other abnormal movements.  · ENDOCRINE: No polydipsia or polyuria.  · INTEGUMENTARY: No rashes or lacerations.  · EYES: No exophthalmos, jaundice or blindness.  · ENT: No dizziness, tinnitus or hearing loss.  · RESPIRATORY: No shortness of breath.  · CARDIOVASCULAR: No tachycardia or chest pain.  · GASTROINTESTINAL: No nausea, vomiting, pain, constipation or diarrhea.  · GENITOURINARY: No frequency, dysuria or sexual dysfunction.  · HEMATOLOGIC/LYMPHATIC: No excessive bleeding, prolonged or excessive bleeding after dental extraction/injury.  · ALLERGIC/IMMUNOLOGIC: No allergic response to materials, foods or animals at this time.    Past Medical, Family and Social History: The patient's past medical, family and social history have been reviewed and updated as appropriate within the electronic medical record - see encounter notes.    Compliance: yes    Side effects: None    Risk Parameters:  Patient reports no suicidal ideation  Patient reports no homicidal ideation  Patient reports no self-injurious behavior  Patient reports no violent behavior    Exam (detailed: at least 9 elements; comprehensive: all 15 elements)   Constitutional  Vitals:  Most recent vital signs, dated less than 90 days prior to this appointment, were  "reviewed.   Vitals:    01/29/18 1452   BP: 123/69   Pulse: 100   Weight: 65.3 kg (144 lb)   Height: 5' 4" (1.626 m)        General:  unremarkable, age appropriate     Musculoskeletal  Muscle Strength/Tone:  no tremor, no tic   Gait & Station:  non-ataxic     Psychiatric  Speech:  no latency; no press   Mood & Affect:  euthymic  congruent and appropriate, bright   Thought Process:  normal and logical   Associations:  intact   Thought Content:  normal, no suicidality, no homicidality, delusions, or paranoia   Insight:  intact   Judgement: behavior is adequate to circumstances   Orientation:  grossly intact   Memory: intact for content of interview   Language: grossly intact   Attention Span & Concentration:  able to focus   Fund of Knowledge:  intact and appropriate to age and level of education     Assessment and Diagnosis   Status/Progress: Based on the examination today, the patient's problem(s) is/are improved.  New problems have not been presented today.   Co-morbidities and Lack of compliance are not complicating management of the primary condition.  There are no active rule-out diagnoses for this patient at this time.     General Impression:       ICD-10-CM ICD-9-CM   1. Postpartum depression F53 648.44     311   2. Postpartum anxiety O99.345 648.44    F41.1 300.00   3. Nightmares F51.5 307.47   4. Insomnia disorder, with non-sleep disorder mental comorbidity G47.00 780.52   5. Grief F43.20 309.0       Intervention/Counseling/Treatment Plan   · Medication Management: The risks and benefits of medication were discussed with the patient.   · continue Lexapro 20 mg po daily  · Increase to Lamictal to 125 mg po q hs (100 mg + 25 mg tablets)  · Continue  Klonopin 1 mg po q hs  · Continue Prazosin 2 mg po q hs  · Continue individual therapy with Trista Dubois LCSW.  Pt instructed to set appointment ASAP and f/u every 1-3 weeks.  (pt set earliest available appt on 2/07/18)  · Referral to BMU .   · Pt agreed to updated " tx plan and acknowledged understanding  · Reviewed emergency resources of suicide hotline 1(122) 480-BBYX, 410, and Emergency Dept in case of crisis or suicidal thoughts.      Return to Clinic: 1 month

## 2018-02-07 ENCOUNTER — OFFICE VISIT (OUTPATIENT)
Dept: PSYCHIATRY | Facility: CLINIC | Age: 33
End: 2018-02-07
Payer: MEDICAID

## 2018-02-07 PROCEDURE — 90834 PSYTX W PT 45 MINUTES: CPT | Mod: PBBFAC | Performed by: SOCIAL WORKER

## 2018-02-07 PROCEDURE — 90834 PSYTX W PT 45 MINUTES: CPT | Mod: AJ,HB,S$PBB, | Performed by: SOCIAL WORKER

## 2018-02-07 NOTE — PROGRESS NOTES
Individual Psychotherapy (PhD/LCSW)    2/7/2018    Site:  [facility]         Therapeutic Intervention: Met with patient.  Outpatient - Supportive psychotherapy 45 min - CPT Code 70167    Chief complaint/reason for encounter: depression and mood swings     Interval history and content of current session:     Sarah reports that she has been feeling better, and she has managed to bond with her baby.  Unlike last session, she held him and responded when he expressed distress.  She reports that her fiance has been spending more time with the family.  She is still angry with him for not helping out when Master was first born, but she is trying to let go of that angry and appreciate the fact that he spends some time most days with the children.  They are planning on moving in together in September.    She is on leave from work until April.      She is sleeping better, and reports that she is having fewer mood swings.     Treatment plan:  · Target symptoms: depression, mood swings  · Why chosen therapy is appropriate versus another modality: relevant to diagnosis  · Outcome monitoring methods: self-report  · Therapeutic intervention type: supportive psychotherapy    Risk parameters:  Patient reports no suicidal ideation  Patient reports no homicidal ideation  Patient reports no self-injurious behavior  Patient reports no violent behavior    Verbal deficits: None    Patient's response to intervention:  The patient's response to intervention is accepting.    Progress toward goals and other mental status changes:  The patient's progress toward goals is good.    Diagnosis:     ICD-10-CM ICD-9-CM   1. Postpartum depression F53 648.44     311       Plan:  individual psychotherapy    Return to clinic: as needed    Length of Service (minutes): 45

## 2018-02-22 ENCOUNTER — OFFICE VISIT (OUTPATIENT)
Dept: PSYCHIATRY | Facility: CLINIC | Age: 33
End: 2018-02-22
Payer: MEDICAID

## 2018-02-22 PROCEDURE — 90834 PSYTX W PT 45 MINUTES: CPT | Mod: PBBFAC | Performed by: SOCIAL WORKER

## 2018-02-22 PROCEDURE — 90834 PSYTX W PT 45 MINUTES: CPT | Mod: AJ,HB,S$PBB, | Performed by: SOCIAL WORKER

## 2018-02-22 NOTE — PROGRESS NOTES
Individual Psychotherapy (PhD/LCSW)    2/22/2018    Site:  [facility]         Therapeutic Intervention: Met with patient.  Outpatient - Supportive psychotherapy 45 min - CPT Code 68580    Chief complaint/reason for encounter: depression and mood swings     Interval history and content of current session:     Sarah reports she still plans to be on leave from work until April.  She says she is having fewer mood swings, and she is bonding well with the baby now.  She is still having trouble getting enough sleep at night and is currently only sleeping about 3 hours a night.  Discussed relationship and communication issues.  Practiced progressive muscle relaxation.    Treatment plan:  · Target symptoms: depression, mood swings  · Why chosen therapy is appropriate versus another modality: relevant to diagnosis  · Outcome monitoring methods: self-report  · Therapeutic intervention type: supportive psychotherapy    Risk parameters:  Patient reports no suicidal ideation  Patient reports no homicidal ideation  Patient reports no self-injurious behavior  Patient reports no violent behavior    Verbal deficits: None    Patient's response to intervention:  The patient's response to intervention is accepting.    Progress toward goals and other mental status changes:  The patient's progress toward goals is good.    Diagnosis:     ICD-10-CM ICD-9-CM   1. Postpartum depression F53 648.44     311       Plan:  individual psychotherapy    Return to clinic: as needed    Length of Service (minutes): 45

## 2018-03-09 ENCOUNTER — OFFICE VISIT (OUTPATIENT)
Dept: PSYCHIATRY | Facility: CLINIC | Age: 33
End: 2018-03-09
Payer: MEDICAID

## 2018-03-09 PROCEDURE — 90834 PSYTX W PT 45 MINUTES: CPT | Mod: HP,HB,S$PBB, | Performed by: SOCIAL WORKER

## 2018-03-09 PROCEDURE — 90834 PSYTX W PT 45 MINUTES: CPT | Mod: PBBFAC | Performed by: SOCIAL WORKER

## 2018-03-11 NOTE — PROGRESS NOTES
Individual Psychotherapy (PhD/LCSW)    3/9/2018    Site:  [facility]         Therapeutic Intervention: Met with patient.  Outpatient - Supportive psychotherapy 45 min - CPT Code 67369    Chief complaint/reason for encounter: depression and mood swings     Interval history and content of current session:     Sarah reports she still plans to be on leave from work until April.  She says she is having fewer mood swings, and she is bonding well with the baby now.  She is still having trouble getting enough sleep at night.  She also discussed some physical symptoms which she feels are related to her medication (feeling dizzy and faint, decreased libido).  She stated that she has been embarrassed to discuss this with Shady Arango Np.  Normalized this emotion, but let her know that Mr. Arango knows that this is a potential side effect, and he is well-equipped to understand and advise her about it.  Discussed relationship and communication issues.     Note:  She brought both of her children to session.  At one point, I had to ask her repeatedly to remove the baby from the sand tray area because the toys are not safe for infants.  She did not seem to notice that he repeatedly returned to that area.    Treatment plan:  · Target symptoms: depression, mood swings  · Why chosen therapy is appropriate versus another modality: relevant to diagnosis  · Outcome monitoring methods: self-report  · Therapeutic intervention type: supportive psychotherapy    Risk parameters:  Patient reports no suicidal ideation  Patient reports no homicidal ideation  Patient reports no self-injurious behavior  Patient reports no violent behavior    Verbal deficits: None    Patient's response to intervention:  The patient's response to intervention is accepting.    Progress toward goals and other mental status changes:  The patient's progress toward goals is good.    Diagnosis:     ICD-10-CM ICD-9-CM   1. Post partum depression F53 648.44      311       Plan:  individual psychotherapy    Return to clinic: as needed    Length of Service (minutes): 45

## 2018-03-13 ENCOUNTER — OFFICE VISIT (OUTPATIENT)
Dept: PSYCHIATRY | Facility: CLINIC | Age: 33
End: 2018-03-13
Payer: MEDICAID

## 2018-03-13 VITALS — SYSTOLIC BLOOD PRESSURE: 118 MMHG | HEIGHT: 64 IN | DIASTOLIC BLOOD PRESSURE: 70 MMHG | HEART RATE: 87 BPM

## 2018-03-13 DIAGNOSIS — G47.00 INSOMNIA DISORDER, WITH NON-SLEEP DISORDER MENTAL COMORBIDITY: ICD-10-CM

## 2018-03-13 DIAGNOSIS — F51.5 NIGHTMARES: ICD-10-CM

## 2018-03-13 DIAGNOSIS — F41.8 POSTPARTUM ANXIETY: ICD-10-CM

## 2018-03-13 PROCEDURE — 90833 PSYTX W PT W E/M 30 MIN: CPT | Mod: AF,HB,S$PBB, | Performed by: NURSE PRACTITIONER

## 2018-03-13 PROCEDURE — 90833 PSYTX W PT W E/M 30 MIN: CPT | Mod: PBBFAC | Performed by: NURSE PRACTITIONER

## 2018-03-13 PROCEDURE — 99214 OFFICE O/P EST MOD 30 MIN: CPT | Mod: AF,HB,S$PBB, | Performed by: NURSE PRACTITIONER

## 2018-03-13 PROCEDURE — 99212 OFFICE O/P EST SF 10 MIN: CPT | Mod: PBBFAC | Performed by: NURSE PRACTITIONER

## 2018-03-13 PROCEDURE — 99999 PR PBB SHADOW E&M-EST. PATIENT-LVL II: CPT | Mod: PBBFAC,,, | Performed by: NURSE PRACTITIONER

## 2018-03-13 RX ORDER — ESCITALOPRAM OXALATE 20 MG/1
20 TABLET ORAL DAILY
Qty: 30 TABLET | Refills: 5 | Status: SHIPPED | OUTPATIENT
Start: 2018-03-13 | End: 2018-04-04 | Stop reason: SDUPTHER

## 2018-03-13 RX ORDER — CLONAZEPAM 0.5 MG/1
TABLET ORAL
Qty: 60 TABLET | Refills: 3 | Status: SHIPPED | OUTPATIENT
Start: 2018-03-13 | End: 2018-04-04 | Stop reason: SDUPTHER

## 2018-03-13 RX ORDER — PRAZOSIN HYDROCHLORIDE 1 MG/1
1 CAPSULE ORAL NIGHTLY
Qty: 30 CAPSULE | Refills: 2 | Status: SHIPPED | OUTPATIENT
Start: 2018-03-13 | End: 2018-04-04

## 2018-03-13 RX ORDER — LAMOTRIGINE 100 MG/1
100 TABLET ORAL NIGHTLY
Qty: 30 TABLET | Refills: 5 | Status: SHIPPED | OUTPATIENT
Start: 2018-03-13 | End: 2018-04-04 | Stop reason: SDUPTHER

## 2018-03-13 RX ORDER — TRAZODONE HYDROCHLORIDE 100 MG/1
TABLET ORAL
Qty: 45 TABLET | Refills: 5 | Status: SHIPPED | OUTPATIENT
Start: 2018-03-13 | End: 2018-04-04

## 2018-03-13 NOTE — PROGRESS NOTES
Outpatient Psychiatry Follow-Up Visit (MD/NP)    3/13/2018    Clinical Status of Patient:  Outpatient (Ambulatory)    Chief Complaint:  Sarah Lee is a 32 y.o. female who presents today for follow-up of depression and anxiety.  Met with patient.  Last visit was on 1/02/18.  Chart reviewed.    Interval History and Content of Current Session:  Current Psych Medication  · continue Lexapro 20 mg po daily  · Increase to Lamictal to 125 mg po q hs (100 mg + 25 mg tablets)  · Continue  Klonopin 1 mg po q hs  · Continue Prazosin 2 mg po q hs      Note from Therapy session with Katherine Nam LCSW on 3/09/18:   She says she is having fewer mood swings, and she is bonding well with the baby now.  She is still having trouble getting enough sleep at night.  She also discussed some physical symptoms which she feels are related to her medication (feeling dizzy and faint, decreased libido).  She stated that she has been embarrassed to discuss this with Shady Arango Np.  Normalized this emotion, but let her know that Mr. Arango knows that this is a potential side effect, and he is well-equipped to understand and advise her about it.  Discussed relationship and communication issues      Will transition by to work with target date April 2nd with half-day (4-5 hr) shift accommodation 7am-12pm or 8am-1pm for 30-60 days.  Pt reports that she still is not sleeping well and feels faint in the morning.  Will decrease Prazosin and consider stopping next visit.  Denies nightmares at this time.  Pt also reports breakthrough anxiety and irritability towards her 5 year old son.  Pt's children were present during visit and their behavior appears to be a challenge for Sarah to parent by herself.  Pt agreed that transitioning back to work may help provide balance instead of being at home all day  Pt denies SI/HI/AVH.         Psychotherapy:  · Target symptoms: depression, anxiety , mood swings  · Why chosen therapy is appropriate versus  another modality: relevant to diagnosis  · Outcome monitoring methods: self-report, observation  · Therapeutic intervention type: insight oriented psychotherapy, behavior modifying psychotherapy  · Topics discussed/themes: parenting issues, difficulty managing affect in interpersonal relationships, building skills sets for symptom management, symptom recognition  · The patient's response to the intervention is accepting. The patient's progress toward treatment goals is good.   · Duration of intervention: 20 minutes.    Review of Systems   · PSYCHIATRIC: Pertinant items are noted in the narrative.  · CONSTITUTIONAL: No weight gain or loss.   · MUSCULOSKELETAL: No pain or stiffness of the joints.  · NEUROLOGIC: No weakness, sensory changes, seizures, confusion, memory loss, tremor or other abnormal movements.  · ENDOCRINE: No polydipsia or polyuria.  · INTEGUMENTARY: No rashes or lacerations.  · EYES: No exophthalmos, jaundice or blindness.  · ENT: No dizziness, tinnitus or hearing loss.  · RESPIRATORY: No shortness of breath.  · CARDIOVASCULAR: No tachycardia or chest pain.  · GASTROINTESTINAL: No nausea, vomiting, pain, constipation or diarrhea.  · GENITOURINARY: No frequency, dysuria or sexual dysfunction.  · HEMATOLOGIC/LYMPHATIC: No excessive bleeding, prolonged or excessive bleeding after dental extraction/injury.  · ALLERGIC/IMMUNOLOGIC: No allergic response to materials, foods or animals at this time.    Past Medical, Family and Social History: The patient's past medical, family and social history have been reviewed and updated as appropriate within the electronic medical record - see encounter notes.    Compliance: yes    Side effects: None    Risk Parameters:  Patient reports no suicidal ideation  Patient reports no homicidal ideation  Patient reports no self-injurious behavior  Patient reports no violent behavior    Exam (detailed: at least 9 elements; comprehensive: all 15 elements)  "  Constitutional  Vitals:  Most recent vital signs, dated greater than 90 days prior to this appointment, were reviewed.   Vitals:    03/13/18 1423   BP: 118/70   Pulse: 87   Height: 5' 4" (1.626 m)        General:  unremarkable, age appropriate     Musculoskeletal  Muscle Strength/Tone:  no tremor, no tic   Gait & Station:  non-ataxic     Psychiatric  Speech:  no latency; no press   Mood & Affect:  euthymic  congruent and appropriate, bright   Thought Process:  normal and logical   Associations:  intact   Thought Content:  normal, no suicidality, no homicidality, delusions, or paranoia   Insight:  intact   Judgement: behavior is adequate to circumstances   Orientation:  grossly intact   Memory: intact for content of interview   Language: grossly intact   Attention Span & Concentration:  able to focus   Fund of Knowledge:  intact and appropriate to age and level of education     Assessment and Diagnosis   Status/Progress: Based on the examination today, the patient's problem(s) is/are adequately but not ideally controlled.  New problems have not been presented today.   Co-morbidities and Lack of compliance are not complicating management of the primary condition.  There are no active rule-out diagnoses for this patient at this time.     General Impression:       ICD-10-CM ICD-9-CM   1. Postpartum depression F53 648.44     311   2. Nightmares F51.5 307.47   3. Postpartum anxiety O99.345 648.44    F41.1 300.00   4. Insomnia disorder, with non-sleep disorder mental comorbidity G47.00 780.52       Intervention/Counseling/Treatment Plan   · Medication Management: The risks and benefits of medication were discussed with the patient.   · continue Lexapro 20 mg po daily  · Decrease back to Lamictal to 100 mg po q hs   · Switch to Klonopin 0.5 mg po BID PRN anxiety  · Decrease to Prazosin 1 mg po q hs  · Start Trazodone 100 mg (0.5-1.5 tabs) po q hs PRN insomnia  · Will consider Wellbutrin next encounter for decreased libido " "due to SSRI's next visit.  · Continue individual therapy with Trista Dubois LCSW.    · Completed FMLA paperwork and "return to work" letter  · Pt agreed to updated tx plan and acknowledged understanding  · Reviewed emergency resources of suicide hotline 1(006) 191-CTHF, 549, and Emergency Dept in case of crisis or suicidal thoughts.      Return to Clinic: 1 month   "

## 2018-03-14 ENCOUNTER — PATIENT MESSAGE (OUTPATIENT)
Dept: PSYCHIATRY | Facility: CLINIC | Age: 33
End: 2018-03-14

## 2018-03-23 ENCOUNTER — OFFICE VISIT (OUTPATIENT)
Dept: PSYCHIATRY | Facility: CLINIC | Age: 33
End: 2018-03-23
Payer: MEDICAID

## 2018-03-23 PROCEDURE — 90834 PSYTX W PT 45 MINUTES: CPT | Mod: PBBFAC | Performed by: SOCIAL WORKER

## 2018-03-23 PROCEDURE — 90834 PSYTX W PT 45 MINUTES: CPT | Mod: AJ,HB,S$PBB, | Performed by: SOCIAL WORKER

## 2018-03-23 NOTE — PROGRESS NOTES
"Individual Psychotherapy (PhD/LCSW)    3/23/2018    Site:  [facility]         Therapeutic Intervention: Met with patient.  Outpatient - Supportive psychotherapy 45 min - CPT Code 81606    Chief complaint/reason for encounter: depression and mood swings     Interval history and content of current session:     Sarah reports she learned 2 days ago that her fiance has been involved with another woman, and that woman is now pregnant with his child.  He has apologized and wants to stay together, but she says she is not sure if she wants to remain in that relationship.  She said, "it's all I can think about.  I'm crying all the time.  My medication isn't helping at all, and I'm not sleeping at all."  She also says her appetite has decreased significantly, though she is eating.  She says she has attempted to use the relaxation techniques we practiced in previous session, but they are not working.  She is supposed to start work on April 2, but she says she isn't sure if she will be able to that.  We discussed whether work might be a good distraction from her personal life, but she didn't seem to feel that this is that case.    Sarah brought her infant and her school-aged child to session, so she was unable to speak freely.  We discussed how this negatively affects therapy.      Recommended [facility]. Sarah indicated that she now thinks that this would be a good option.  I gave her a brochure and suggested she call [facility] and message Shady Arango NP to explore this possibility further.          Treatment plan:  · Target symptoms: depression, mood swings  · Why chosen therapy is appropriate versus another modality: relevant to diagnosis  · Outcome monitoring methods: self-report  · Therapeutic intervention type: supportive psychotherapy    Risk parameters:  Patient reports no suicidal ideation  Patient reports no homicidal ideation  Patient reports no self-injurious behavior  Patient reports no violent " behavior    Verbal deficits: None    Patient's response to intervention:  The patient's response to intervention is accepting.    Progress toward goals and other mental status changes:  The patient's progress toward goals is good.    Diagnosis:     ICD-10-CM ICD-9-CM   1. Post partum depression F53 648.44     311       Plan:  individual psychotherapy    Return to clinic: as needed    Length of Service (minutes): 45

## 2018-04-04 ENCOUNTER — OFFICE VISIT (OUTPATIENT)
Dept: PSYCHIATRY | Facility: CLINIC | Age: 33
End: 2018-04-04
Payer: MEDICAID

## 2018-04-04 VITALS
BODY MASS INDEX: 23.86 KG/M2 | HEIGHT: 64 IN | HEART RATE: 83 BPM | DIASTOLIC BLOOD PRESSURE: 70 MMHG | WEIGHT: 139.75 LBS | SYSTOLIC BLOOD PRESSURE: 110 MMHG

## 2018-04-04 DIAGNOSIS — G47.00 INSOMNIA DISORDER, WITH NON-SLEEP DISORDER MENTAL COMORBIDITY: Primary | ICD-10-CM

## 2018-04-04 DIAGNOSIS — F41.8 POSTPARTUM ANXIETY: ICD-10-CM

## 2018-04-04 PROCEDURE — 99213 OFFICE O/P EST LOW 20 MIN: CPT | Mod: PBBFAC | Performed by: NURSE PRACTITIONER

## 2018-04-04 PROCEDURE — 99999 PR PBB SHADOW E&M-EST. PATIENT-LVL III: CPT | Mod: PBBFAC,,, | Performed by: NURSE PRACTITIONER

## 2018-04-04 PROCEDURE — 90833 PSYTX W PT W E/M 30 MIN: CPT | Mod: PBBFAC | Performed by: NURSE PRACTITIONER

## 2018-04-04 PROCEDURE — 90833 PSYTX W PT W E/M 30 MIN: CPT | Mod: SA,HB,S$PBB, | Performed by: NURSE PRACTITIONER

## 2018-04-04 PROCEDURE — 99213 OFFICE O/P EST LOW 20 MIN: CPT | Mod: SA,HB,S$PBB, | Performed by: NURSE PRACTITIONER

## 2018-04-04 RX ORDER — ESCITALOPRAM OXALATE 20 MG/1
20 TABLET ORAL DAILY
Qty: 30 TABLET | Refills: 5 | Status: SHIPPED | OUTPATIENT
Start: 2018-04-04 | End: 2018-05-14 | Stop reason: SDUPTHER

## 2018-04-04 RX ORDER — MIRTAZAPINE 15 MG/1
15 TABLET, ORALLY DISINTEGRATING ORAL NIGHTLY
Qty: 30 TABLET | Refills: 5 | Status: SHIPPED | OUTPATIENT
Start: 2018-04-04 | End: 2018-05-14 | Stop reason: SDUPTHER

## 2018-04-04 RX ORDER — CLONAZEPAM 0.5 MG/1
0.5 TABLET ORAL 3 TIMES DAILY PRN
Qty: 60 TABLET | Refills: 3 | Status: SHIPPED | OUTPATIENT
Start: 2018-04-04 | End: 2018-05-14 | Stop reason: SDUPTHER

## 2018-04-04 RX ORDER — LAMOTRIGINE 100 MG/1
100 TABLET ORAL NIGHTLY
Qty: 30 TABLET | Refills: 5 | Status: SHIPPED | OUTPATIENT
Start: 2018-04-04 | End: 2018-05-14 | Stop reason: SDUPTHER

## 2018-04-04 NOTE — PROGRESS NOTES
"Outpatient Psychiatry Follow-Up Visit (MD/NP)    4/4/2018    Clinical Status of Patient:  Outpatient (Ambulatory)    Chief Complaint:  Sarah Lee is a 32 y.o. female who presents today for follow-up of depression and anxiety.  Met with patient.      Last Visit: was on 3/13/18.  Chart reviewed.    Interval History and Content of Current Session:  · Current Psych Medication as of last visit  · Continue Lexapro 20 mg po daily  · Decrease back to Lamictal to 100 mg po q hs   · Switch to Klonopin 0.5 mg po BID PRN anxiety  · Decrease to Prazosin 1 mg po q hs  · Start Trazodone 100 mg (0.5-1.5 tabs) po q hs PRN insomnia  · Will consider Wellbutrin next encounter for decreased libido due to SSRI's next visit.      Pt reports that she discovered a few weeks ago that her fiance has been involved with another woman, and that woman is now pregnant with his child.  He has apologized and wants to stay together, but she says she is not sure if she wants to remain in that relationship.  She said, "it's all I can think about.  I'm crying all the time.  My medication isn't helping at all, and I'm not sleeping at all."  She also reports decreased appetite and increased insomnia.  Pt taking Klonopin BID and reports some relief. She denies side effects to medications.  She returned to work and is transitioning well despite new onset of situational stressors.  Denies SI/HI/AVH.      Recommended medication adjustments. Pt acknowledged understanding.    Psychotherapy:  · Target symptoms: depression, anxiety   · Why chosen therapy is appropriate versus another modality: relevant to diagnosis  · Outcome monitoring methods: self-report, observation  · Therapeutic intervention type: insight oriented psychotherapy, behavior modifying psychotherapy  · Topics discussed/themes: relationships difficulties, parenting issues, difficulty managing affect in interpersonal relationships, building skills sets for symptom management, symptom " "recognition  · The patient's response to the intervention is accepting. The patient's progress toward treatment goals is fair.   · Duration of intervention: 18 minutes.    Review of Systems   · PSYCHIATRIC: Pertinant items are noted in the narrative.  · CONSTITUTIONAL: No weight gain or loss.   · MUSCULOSKELETAL: No pain or stiffness of the joints.  · NEUROLOGIC: No weakness, sensory changes, seizures, confusion, memory loss, tremor or other abnormal movements.  · ENDOCRINE: No polydipsia or polyuria.  · INTEGUMENTARY: No rashes or lacerations.  · EYES: No exophthalmos, jaundice or blindness.  · ENT: No dizziness, tinnitus or hearing loss.  · RESPIRATORY: No shortness of breath.  · CARDIOVASCULAR: No tachycardia or chest pain.  · GASTROINTESTINAL: No nausea, vomiting, pain, constipation or diarrhea.  · GENITOURINARY: No frequency, dysuria or sexual dysfunction.  · HEMATOLOGIC/LYMPHATIC: No excessive bleeding, prolonged or excessive bleeding after dental extraction/injury.  · ALLERGIC/IMMUNOLOGIC: No allergic response to materials, foods or animals at this time.    Past Medical, Family and Social History: The patient's past medical, family and social history have been reviewed and updated as appropriate within the electronic medical record - see encounter notes.    Compliance: yes    Side effects: None    Risk Parameters:  Patient reports no suicidal ideation  Patient reports no homicidal ideation  Patient reports no self-injurious behavior  Patient reports no violent behavior    Exam (detailed: at least 9 elements; comprehensive: all 15 elements)   Constitutional  Vitals:  Most recent vital signs, dated greater than 90 days prior to this appointment, were reviewed.   Vitals:    04/04/18 0810   BP: 110/70   Pulse: 83   Weight: 63.4 kg (139 lb 12.4 oz)   Height: 5' 4" (1.626 m)        General:  unremarkable, age appropriate     Musculoskeletal  Muscle Strength/Tone:  no tremor, no tic   Gait & Station:  non-ataxic "     Psychiatric  Speech:  no latency; no press   Mood & Affect:  sad  congruent and appropriate   Thought Process:  normal and logical   Associations:  intact   Thought Content:  normal, no suicidality, no homicidality, delusions, or paranoia   Insight:  intact   Judgement: behavior is adequate to circumstances   Orientation:  grossly intact   Memory: intact for content of interview   Language: grossly intact   Attention Span & Concentration:  able to focus   Fund of Knowledge:  intact and appropriate to age and level of education     Assessment and Diagnosis   Status/Progress: Based on the examination today, the patient's problem(s) is/are worsening.  New problems have been presented today (situational stressors regarding her child's father).   Co-morbidities and Lack of compliance are not complicating management of the primary condition.  There are no active rule-out diagnoses for this patient at this time.     General Impression:       ICD-10-CM ICD-9-CM   1. Insomnia disorder, with non-sleep disorder mental comorbidity G47.00 780.52   2. Postpartum depression F53 648.44     311   3. Postpartum anxiety O99.345 648.44    F41.1 300.00       Intervention/Counseling/Treatment Plan   · Medication Management: The risks and benefits of medication were discussed with the patient.   · continue Lexapro 20 mg po daily  · Continue Lamictal to 100 mg po q hs   · Increase to Klonopin 0.5 mg po TID PRN anxiety  · Discontinue Prazosin 1 mg po q hs  · Discontinue Trazodone 100 mg (0.5-1.5 tabs) po q hs PRN insomnia  · Start Remeron 15 mg po q hs   · Continue individual therapy with Trista Dubois LCSW.    · Pt agreed to updated tx plan and acknowledged understanding  · Reviewed emergency resources of suicide hotline [phone]) 816-HVNX, 735, and Emergency Dept in case of crisis or suicidal thoughts.    Return to Clinic: 1 month

## 2018-04-22 ENCOUNTER — PATIENT MESSAGE (OUTPATIENT)
Dept: PSYCHIATRY | Facility: CLINIC | Age: 33
End: 2018-04-22

## 2018-04-29 NOTE — TELEPHONE ENCOUNTER
I haven't received anything from [facility] on this patient.  Do you know if they sent anything?

## 2018-05-08 ENCOUNTER — PATIENT MESSAGE (OUTPATIENT)
Dept: PSYCHIATRY | Facility: CLINIC | Age: 33
End: 2018-05-08

## 2018-05-13 NOTE — PROGRESS NOTES
"Outpatient Psychiatry Follow-Up Visit (MD/NP)    5/14/2018    Clinical Status of Patient:  Outpatient (Ambulatory)    Chief Complaint:  Sarah Lee is a 32 y.o. female who presents today for follow-up of depression and anxiety.  Met with patient.      Last Visit: was on 4/04/18.  Chart reviewed.    Interval History and Content of Current Session:  · Current Psych Medication as of last visit  · continue Lexapro 20 mg po daily  · Continue Lamictal to 100 mg po q hs   · Increase to Klonopin 0.5 mg po TID PRN anxiety  · Discontinue Prazosin 1 mg po q hs  · Discontinue Trazodone 100 mg (0.5-1.5 tabs) po q hs PRN insomnia  · Start Remeron 15 mg po q hs       Pt presents bright affect and euthymic mood.  Stated, " I feel much better".  Reports frequent mild panic attacks at work. Instructed pt to  take breaks at work when she gets overwhelmed to perform deep breathing exercises and use Klonopin for more severe panic episodes.  Currently averaging Klonopin TID due to anxiety while transitioning back to work.  Stated that her mood is overall improved now that she is working and her youngest son is sleeping better which allows her to sleep better.  Appetite has improved with Remeron. Denies SI/HI/AVH.  Pt back at work since 4/02 part-time status and plan to return to full-time status on 6/02/18.  Pt agreeable with plan.      Psychotherapy:  · Target symptoms: depression, anxiety   · Why chosen therapy is appropriate versus another modality: relevant to diagnosis  · Outcome monitoring methods: self-report, observation  · Therapeutic intervention type: insight oriented psychotherapy, behavior modifying psychotherapy  · Topics discussed/themes: relationships difficulties, parenting issues, difficulty managing affect in interpersonal relationships, building skills sets for symptom management, symptom recognition  · The patient's response to the intervention is accepting. The patient's progress toward treatment goals is " fair.   · Duration of intervention: 18 minutes.    Review of Systems   · PSYCHIATRIC: Pertinant items are noted in the narrative.  · CONSTITUTIONAL: No weight gain or loss.   · MUSCULOSKELETAL: No pain or stiffness of the joints.  · NEUROLOGIC: No weakness, sensory changes, seizures, confusion, memory loss, tremor or other abnormal movements.  · ENDOCRINE: No polydipsia or polyuria.  · INTEGUMENTARY: No rashes or lacerations.  · EYES: No exophthalmos, jaundice or blindness.  · ENT: No dizziness, tinnitus or hearing loss.  · RESPIRATORY: No shortness of breath.  · CARDIOVASCULAR: No tachycardia or chest pain.  · GASTROINTESTINAL: No nausea, vomiting, pain, constipation or diarrhea.  · GENITOURINARY: No frequency, dysuria or sexual dysfunction.  · HEMATOLOGIC/LYMPHATIC: No excessive bleeding, prolonged or excessive bleeding after dental extraction/injury.  · ALLERGIC/IMMUNOLOGIC: No allergic response to materials, foods or animals at this time.    Past Medical, Family and Social History: The patient's past medical, family and social history have been reviewed and updated as appropriate within the electronic medical record - see encounter notes.    Compliance: yes    Side effects: None    Risk Parameters:  Patient reports no suicidal ideation  Patient reports no homicidal ideation  Patient reports no self-injurious behavior  Patient reports no violent behavior    Exam (detailed: at least 9 elements; comprehensive: all 15 elements)   Constitutional  Vitals:  Most recent vital signs, dated greater than 90 days prior to this appointment, were reviewed.   Vitals:    05/14/18 1341   BP: (!) 148/70   Pulse: 98   Weight: 64.1 kg (141 lb 5 oz)      General:  unremarkable, age appropriate     Musculoskeletal  Muscle Strength/Tone:  no tremor, no tic   Gait & Station:  non-ataxic     Psychiatric  Speech:  no latency; no press   Mood & Affect:  sad  congruent and appropriate   Thought Process:  normal and logical   Associations:   intact   Thought Content:  normal, no suicidality, no homicidality, delusions, or paranoia   Insight:  intact   Judgement: behavior is adequate to circumstances   Orientation:  grossly intact   Memory: intact for content of interview   Language: grossly intact   Attention Span & Concentration:  able to focus   Fund of Knowledge:  intact and appropriate to age and level of education     Assessment and Diagnosis   Status/Progress: Based on the examination today, the patient's problem(s) is/are improved and adequately but not ideally controlled.  New problems have been presented today (panic attacks) .   Co-morbidities and Lack of compliance are not complicating management of the primary condition.  There are no active rule-out diagnoses for this patient at this time.     General Impression:       ICD-10-CM ICD-9-CM   1. Generalized anxiety disorder with panic attacks F41.1 300.02    F41.0 300.01   2. Postpartum depression F53 648.44     311   3. Insomnia disorder, with non-sleep disorder mental comorbidity G47.00 780.52   4. Postpartum anxiety O99.345 648.44    F41.1 300.00       Intervention/Counseling/Treatment Plan   · Medication Management: The risks and benefits of medication were discussed with the patient.   · continue Lexapro 20 mg po daily  · Continue Lamictal to 100 mg po q hs   · Continueo Klonopin 0.5 mg po TID PRN panic attacks  · Continue Remeron 15 mg po q hs   · Continue individual therapy with Trista Dubois LCSW.    · Pt agreed to updated tx plan and acknowledged understanding  · Composed letter to return to full-time status at work without restrictions on 6/02/18  · Reviewed emergency resources of suicide hotline 8(976) 103-OGLK, 311, and Emergency Dept in case of crisis or suicidal thoughts.    Return to Clinic: 6 months

## 2018-05-14 ENCOUNTER — OFFICE VISIT (OUTPATIENT)
Dept: PSYCHIATRY | Facility: CLINIC | Age: 33
End: 2018-05-14
Payer: MEDICAID

## 2018-05-14 VITALS
HEART RATE: 98 BPM | BODY MASS INDEX: 24.26 KG/M2 | DIASTOLIC BLOOD PRESSURE: 70 MMHG | WEIGHT: 141.31 LBS | SYSTOLIC BLOOD PRESSURE: 148 MMHG

## 2018-05-14 DIAGNOSIS — F41.0 GENERALIZED ANXIETY DISORDER WITH PANIC ATTACKS: Primary | ICD-10-CM

## 2018-05-14 DIAGNOSIS — G47.00 INSOMNIA DISORDER, WITH NON-SLEEP DISORDER MENTAL COMORBIDITY: ICD-10-CM

## 2018-05-14 DIAGNOSIS — F41.8 POSTPARTUM ANXIETY: ICD-10-CM

## 2018-05-14 DIAGNOSIS — F41.1 GENERALIZED ANXIETY DISORDER WITH PANIC ATTACKS: Primary | ICD-10-CM

## 2018-05-14 PROCEDURE — 99214 OFFICE O/P EST MOD 30 MIN: CPT | Mod: AF,HB,S$PBB, | Performed by: NURSE PRACTITIONER

## 2018-05-14 PROCEDURE — 99213 OFFICE O/P EST LOW 20 MIN: CPT | Mod: PBBFAC | Performed by: NURSE PRACTITIONER

## 2018-05-14 PROCEDURE — 90833 PSYTX W PT W E/M 30 MIN: CPT | Mod: AF,HB,S$PBB, | Performed by: NURSE PRACTITIONER

## 2018-05-14 PROCEDURE — 99999 PR PBB SHADOW E&M-EST. PATIENT-LVL III: CPT | Mod: PBBFAC,,, | Performed by: NURSE PRACTITIONER

## 2018-05-14 PROCEDURE — 90833 PSYTX W PT W E/M 30 MIN: CPT | Mod: PBBFAC | Performed by: NURSE PRACTITIONER

## 2018-05-14 RX ORDER — MIRTAZAPINE 15 MG/1
15 TABLET, ORALLY DISINTEGRATING ORAL NIGHTLY
Qty: 30 TABLET | Refills: 5 | Status: SHIPPED | OUTPATIENT
Start: 2018-05-14 | End: 2021-09-07 | Stop reason: SDUPTHER

## 2018-05-14 RX ORDER — CLONAZEPAM 0.5 MG/1
0.5 TABLET ORAL 3 TIMES DAILY PRN
Qty: 60 TABLET | Refills: 3 | Status: SHIPPED | OUTPATIENT
Start: 2018-05-14 | End: 2021-09-07 | Stop reason: SDUPTHER

## 2018-05-14 RX ORDER — ESCITALOPRAM OXALATE 20 MG/1
20 TABLET ORAL DAILY
Qty: 30 TABLET | Refills: 5 | Status: SHIPPED | OUTPATIENT
Start: 2018-05-14 | End: 2021-09-07

## 2018-05-14 RX ORDER — LAMOTRIGINE 100 MG/1
100 TABLET ORAL NIGHTLY
Qty: 30 TABLET | Refills: 5 | Status: SHIPPED | OUTPATIENT
Start: 2018-05-14 | End: 2021-09-07

## 2021-09-07 ENCOUNTER — OFFICE VISIT (OUTPATIENT)
Dept: PSYCHIATRY | Facility: CLINIC | Age: 36
End: 2021-09-07
Payer: COMMERCIAL

## 2021-09-07 DIAGNOSIS — F41.0 PANIC DISORDER: ICD-10-CM

## 2021-09-07 DIAGNOSIS — G47.00 INSOMNIA DISORDER, WITH NON-SLEEP DISORDER MENTAL COMORBIDITY: ICD-10-CM

## 2021-09-07 PROCEDURE — 99204 PR OFFICE/OUTPT VISIT, NEW, LEVL IV, 45-59 MIN: ICD-10-PCS | Mod: 95,,, | Performed by: NURSE PRACTITIONER

## 2021-09-07 PROCEDURE — 99204 OFFICE O/P NEW MOD 45 MIN: CPT | Mod: 95,,, | Performed by: NURSE PRACTITIONER

## 2021-09-07 RX ORDER — CLONAZEPAM 0.5 MG/1
0.5 TABLET ORAL 2 TIMES DAILY PRN
Qty: 60 TABLET | Refills: 3 | Status: SHIPPED | OUTPATIENT
Start: 2021-09-07 | End: 2021-09-13 | Stop reason: SDUPTHER

## 2021-09-07 RX ORDER — ESCITALOPRAM OXALATE 10 MG/1
10 TABLET ORAL DAILY
Qty: 30 TABLET | Refills: 11 | Status: SHIPPED | OUTPATIENT
Start: 2021-09-07 | End: 2021-09-13

## 2021-09-07 RX ORDER — MIRTAZAPINE 15 MG/1
15 TABLET, ORALLY DISINTEGRATING ORAL NIGHTLY
Qty: 30 TABLET | Refills: 5 | Status: SHIPPED | OUTPATIENT
Start: 2021-09-07 | End: 2021-09-13 | Stop reason: SDUPTHER

## 2021-09-13 ENCOUNTER — OFFICE VISIT (OUTPATIENT)
Dept: PSYCHIATRY | Facility: CLINIC | Age: 36
End: 2021-09-13
Payer: COMMERCIAL

## 2021-09-13 VITALS
HEART RATE: 84 BPM | DIASTOLIC BLOOD PRESSURE: 68 MMHG | WEIGHT: 130.38 LBS | BODY MASS INDEX: 21.7 KG/M2 | SYSTOLIC BLOOD PRESSURE: 125 MMHG

## 2021-09-13 DIAGNOSIS — F32.A DEPRESSION, UNSPECIFIED DEPRESSION TYPE: ICD-10-CM

## 2021-09-13 DIAGNOSIS — F41.0 PANIC DISORDER: Primary | ICD-10-CM

## 2021-09-13 DIAGNOSIS — G47.00 INSOMNIA DISORDER, WITH NON-SLEEP DISORDER MENTAL COMORBIDITY: ICD-10-CM

## 2021-09-13 PROCEDURE — 99213 PR OFFICE/OUTPT VISIT, EST, LEVL III, 20-29 MIN: ICD-10-PCS | Mod: S$GLB,,, | Performed by: NURSE PRACTITIONER

## 2021-09-13 PROCEDURE — 99999 PR PBB SHADOW E&M-EST. PATIENT-LVL III: ICD-10-PCS | Mod: PBBFAC,,, | Performed by: NURSE PRACTITIONER

## 2021-09-13 PROCEDURE — 99999 PR PBB SHADOW E&M-EST. PATIENT-LVL III: CPT | Mod: PBBFAC,,, | Performed by: NURSE PRACTITIONER

## 2021-09-13 PROCEDURE — 90833 PSYTX W PT W E/M 30 MIN: CPT | Mod: S$GLB,,, | Performed by: NURSE PRACTITIONER

## 2021-09-13 PROCEDURE — 90833 PR PSYCHOTHERAPY W/PATIENT W/E&M, 30 MIN (ADD ON): ICD-10-PCS | Mod: S$GLB,,, | Performed by: NURSE PRACTITIONER

## 2021-09-13 PROCEDURE — 99213 OFFICE O/P EST LOW 20 MIN: CPT | Mod: S$GLB,,, | Performed by: NURSE PRACTITIONER

## 2021-09-13 RX ORDER — ESCITALOPRAM OXALATE 20 MG/1
20 TABLET ORAL DAILY
Qty: 90 TABLET | Refills: 3 | Status: SHIPPED | OUTPATIENT
Start: 2021-09-13 | End: 2022-09-22 | Stop reason: SDUPTHER

## 2021-09-13 RX ORDER — MIRTAZAPINE 15 MG/1
15 TABLET, ORALLY DISINTEGRATING ORAL NIGHTLY
Qty: 30 TABLET | Refills: 5 | Status: SHIPPED | OUTPATIENT
Start: 2021-09-13 | End: 2021-12-02 | Stop reason: ALTCHOICE

## 2021-09-13 RX ORDER — CLONAZEPAM 0.5 MG/1
0.5 TABLET ORAL 2 TIMES DAILY PRN
Qty: 60 TABLET | Refills: 3 | Status: SHIPPED | OUTPATIENT
Start: 2021-09-13 | End: 2021-12-02 | Stop reason: ALTCHOICE

## 2021-09-19 PROBLEM — F32.A DEPRESSION: Status: ACTIVE | Noted: 2021-09-19

## 2021-09-19 PROBLEM — F41.0 PANIC DISORDER: Status: ACTIVE | Noted: 2021-09-19

## 2021-09-22 NOTE — PROGRESS NOTES
"CC: Post-partum follow-up     Sarah Lee is a 32 y.o. female  who presents for post-partum visit.  She is sufferring with PP depression and I have been following her weekly  She was put on zoloft  and increased to 100 mg daily  and is seeing therapist weekly.  Her sx are severe.   She is more agitated today.   She has more concerns that she is overwhelmed with caring for her children  SHE HAS NO PLAN TO HURT HERSELF OR THE BABY but she does feel she cannot take the screaming and " does not know what she will do".  Her family was keeping the kids away from her but she has them back.  She has been calling psych and cannot get an appt        Pregnancy was complicated by:  PP depression         /76    Ht 5' 5" (1.651 m)    Wt 67.2 kg (148 lb 2.4 oz)    LMP 2017 (Exact Date)    Breastfeeding? No    BMI 24.65 kg/m²    BSA 1.76 m²      ROS:  GENERAL: No fever, chills, fatigability.  VULVAR: No pain, no lesions and no itching.  VAGINAL: No relaxation, no itching, no discharge, no abnormal bleeding and no lesions.  ABDOMEN: No abdominal pain. Denies nausea. Denies vomiting. No diarrhea. No constipation  BREAST: Denies pain. No lumps.  URINARY: No incontinence, no nocturia, no frequency and no dysuria.  CARDIOVASCULAR: No chest pain. No shortness of breath. No leg cramps.  NEUROLOGICAL: No headaches. No vision changes.     PE:  APPEARANCE: Well nourished, well developed, in no acute distress.  AFFECT: WNL, alert and oriented x 3.  SKIN: No lesions.  NECK: Neck symmetric without masses or thyromegaly.  NODES: No inguinal, cervical, axillary or femoral lymph node enlargement.  CHEST: Good respiratory effort.   ABDOMEN: Soft. No tenderness or masses. No hepatosplenomegaly. No hernias.  BREASTS: Symmetrical, no skin changes or visible lesions. No palpable masses, nipple discharge bilaterally.  PELVIC: ATROPHIC EXTERNAL FEMALE GENITALIA without lesions. Normal hair distribution. Adequate perineal body, " normal urethral meatus. Vagina dry without lesions or discharge. CERVIX STENOTIC without lesions, discharge or tenderness. No significant cystocele or rectocele. Bimanual exam shows uterus to be normal size, regular, mobile and nontender. Adnexa without masses or tenderness.  EXTREMITIES: No edema.            IMP:  Severe PP depression  Doing well S/P   Instructions / precautions reviewed     Rx continue on  zoloft 100 mg daily     PLAN:    Patient is going straight to the ER for severe pp depression, worry about possible PP psychosis  Concerned for the patient-  Called multiple  Social work numbers, OB ED and instructed to go to ER  She will comply with instructions-  And GO TO ER IMMEDIATELY         Hydroxyzine Counseling: Patient advised that the medication is sedating and not to drive a car after taking this medication.  Patient informed of potential adverse effects including but not limited to dry mouth, urinary retention, and blurry vision.  The patient verbalized understanding of the proper use and possible adverse effects of hydroxyzine.  All of the patient's questions and concerns were addressed.

## 2021-12-02 ENCOUNTER — OFFICE VISIT (OUTPATIENT)
Dept: GASTROENTEROLOGY | Facility: CLINIC | Age: 36
End: 2021-12-02
Payer: COMMERCIAL

## 2021-12-02 VITALS
HEIGHT: 65 IN | HEART RATE: 80 BPM | WEIGHT: 131.81 LBS | SYSTOLIC BLOOD PRESSURE: 118 MMHG | DIASTOLIC BLOOD PRESSURE: 78 MMHG | BODY MASS INDEX: 21.96 KG/M2

## 2021-12-02 DIAGNOSIS — K25.9 GASTRIC ULCER, UNSPECIFIED CHRONICITY, UNSPECIFIED WHETHER GASTRIC ULCER HEMORRHAGE OR PERFORATION PRESENT: ICD-10-CM

## 2021-12-02 DIAGNOSIS — R74.8 ELEVATED LIVER ENZYMES: ICD-10-CM

## 2021-12-02 DIAGNOSIS — K21.9 GASTROESOPHAGEAL REFLUX DISEASE, UNSPECIFIED WHETHER ESOPHAGITIS PRESENT: Primary | ICD-10-CM

## 2021-12-02 PROCEDURE — 99999 PR PBB SHADOW E&M-EST. PATIENT-LVL III: CPT | Mod: PBBFAC,,, | Performed by: STUDENT IN AN ORGANIZED HEALTH CARE EDUCATION/TRAINING PROGRAM

## 2021-12-02 PROCEDURE — 99999 PR PBB SHADOW E&M-EST. PATIENT-LVL III: ICD-10-PCS | Mod: PBBFAC,,, | Performed by: STUDENT IN AN ORGANIZED HEALTH CARE EDUCATION/TRAINING PROGRAM

## 2021-12-02 PROCEDURE — 99204 PR OFFICE/OUTPT VISIT, NEW, LEVL IV, 45-59 MIN: ICD-10-PCS | Mod: S$GLB,,, | Performed by: STUDENT IN AN ORGANIZED HEALTH CARE EDUCATION/TRAINING PROGRAM

## 2021-12-02 PROCEDURE — 99204 OFFICE O/P NEW MOD 45 MIN: CPT | Mod: S$GLB,,, | Performed by: STUDENT IN AN ORGANIZED HEALTH CARE EDUCATION/TRAINING PROGRAM

## 2021-12-02 RX ORDER — OMEPRAZOLE 40 MG/1
40 CAPSULE, DELAYED RELEASE ORAL
Qty: 60 CAPSULE | Refills: 2 | Status: SHIPPED | OUTPATIENT
Start: 2021-12-02 | End: 2023-01-12 | Stop reason: SDUPTHER

## 2021-12-13 ENCOUNTER — LAB VISIT (OUTPATIENT)
Dept: LAB | Facility: HOSPITAL | Age: 36
End: 2021-12-13
Attending: FAMILY MEDICINE
Payer: COMMERCIAL

## 2021-12-13 ENCOUNTER — OFFICE VISIT (OUTPATIENT)
Dept: INTERNAL MEDICINE | Facility: CLINIC | Age: 36
End: 2021-12-13
Payer: COMMERCIAL

## 2021-12-13 VITALS
HEART RATE: 83 BPM | WEIGHT: 127.88 LBS | BODY MASS INDEX: 21.83 KG/M2 | OXYGEN SATURATION: 98 % | HEIGHT: 64 IN | DIASTOLIC BLOOD PRESSURE: 70 MMHG | SYSTOLIC BLOOD PRESSURE: 118 MMHG

## 2021-12-13 DIAGNOSIS — Z13.220 SCREENING CHOLESTEROL LEVEL: ICD-10-CM

## 2021-12-13 DIAGNOSIS — R20.2 TINGLING IN EXTREMITIES: ICD-10-CM

## 2021-12-13 DIAGNOSIS — F41.0 PANIC DISORDER: ICD-10-CM

## 2021-12-13 DIAGNOSIS — R20.2 TINGLING IN EXTREMITIES: Primary | ICD-10-CM

## 2021-12-13 DIAGNOSIS — Z11.59 NEED FOR HEPATITIS C SCREENING TEST: ICD-10-CM

## 2021-12-13 LAB
25(OH)D3+25(OH)D2 SERPL-MCNC: 32 NG/ML (ref 30–96)
ALBUMIN SERPL BCP-MCNC: 4.3 G/DL (ref 3.5–5.2)
ALP SERPL-CCNC: 66 U/L (ref 55–135)
ALT SERPL W/O P-5'-P-CCNC: 29 U/L (ref 10–44)
ANION GAP SERPL CALC-SCNC: 12 MMOL/L (ref 8–16)
AST SERPL-CCNC: 25 U/L (ref 10–40)
BILIRUB SERPL-MCNC: 0.6 MG/DL (ref 0.1–1)
BUN SERPL-MCNC: 10 MG/DL (ref 6–20)
CALCIUM SERPL-MCNC: 9.6 MG/DL (ref 8.7–10.5)
CHLORIDE SERPL-SCNC: 102 MMOL/L (ref 95–110)
CHOLEST SERPL-MCNC: 135 MG/DL (ref 120–199)
CHOLEST/HDLC SERPL: 2.6 {RATIO} (ref 2–5)
CO2 SERPL-SCNC: 23 MMOL/L (ref 23–29)
CREAT SERPL-MCNC: 0.8 MG/DL (ref 0.5–1.4)
ERYTHROCYTE [DISTWIDTH] IN BLOOD BY AUTOMATED COUNT: 13 % (ref 11.5–14.5)
EST. GFR  (AFRICAN AMERICAN): >60 ML/MIN/1.73 M^2
EST. GFR  (NON AFRICAN AMERICAN): >60 ML/MIN/1.73 M^2
ESTIMATED AVG GLUCOSE: 94 MG/DL (ref 68–131)
GLUCOSE SERPL-MCNC: 85 MG/DL (ref 70–110)
HBA1C MFR BLD: 4.9 % (ref 4–5.6)
HCT VFR BLD AUTO: 40.2 % (ref 37–48.5)
HDLC SERPL-MCNC: 51 MG/DL (ref 40–75)
HDLC SERPL: 37.8 % (ref 20–50)
HGB BLD-MCNC: 13 G/DL (ref 12–16)
LDLC SERPL CALC-MCNC: 69.4 MG/DL (ref 63–159)
MAGNESIUM SERPL-MCNC: 1.8 MG/DL (ref 1.6–2.6)
MCH RBC QN AUTO: 27.5 PG (ref 27–31)
MCHC RBC AUTO-ENTMCNC: 32.3 G/DL (ref 32–36)
MCV RBC AUTO: 85 FL (ref 82–98)
NONHDLC SERPL-MCNC: 84 MG/DL
PLATELET # BLD AUTO: 331 K/UL (ref 150–450)
PMV BLD AUTO: 9.7 FL (ref 9.2–12.9)
POTASSIUM SERPL-SCNC: 4.6 MMOL/L (ref 3.5–5.1)
PROT SERPL-MCNC: 7.6 G/DL (ref 6–8.4)
RBC # BLD AUTO: 4.72 M/UL (ref 4–5.4)
SODIUM SERPL-SCNC: 137 MMOL/L (ref 136–145)
TRIGL SERPL-MCNC: 73 MG/DL (ref 30–150)
TSH SERPL DL<=0.005 MIU/L-ACNC: 0.65 UIU/ML (ref 0.4–4)
VIT B12 SERPL-MCNC: 1476 PG/ML (ref 210–950)
WBC # BLD AUTO: 7.23 K/UL (ref 3.9–12.7)

## 2021-12-13 PROCEDURE — 99204 PR OFFICE/OUTPT VISIT, NEW, LEVL IV, 45-59 MIN: ICD-10-PCS | Mod: S$GLB,,, | Performed by: FAMILY MEDICINE

## 2021-12-13 PROCEDURE — 36415 COLL VENOUS BLD VENIPUNCTURE: CPT | Performed by: FAMILY MEDICINE

## 2021-12-13 PROCEDURE — 99999 PR PBB SHADOW E&M-EST. PATIENT-LVL III: CPT | Mod: PBBFAC,,, | Performed by: FAMILY MEDICINE

## 2021-12-13 PROCEDURE — 83036 HEMOGLOBIN GLYCOSYLATED A1C: CPT | Performed by: FAMILY MEDICINE

## 2021-12-13 PROCEDURE — 99204 OFFICE O/P NEW MOD 45 MIN: CPT | Mod: S$GLB,,, | Performed by: FAMILY MEDICINE

## 2021-12-13 PROCEDURE — 83735 ASSAY OF MAGNESIUM: CPT | Performed by: FAMILY MEDICINE

## 2021-12-13 PROCEDURE — 85027 COMPLETE CBC AUTOMATED: CPT | Performed by: FAMILY MEDICINE

## 2021-12-13 PROCEDURE — 99999 PR PBB SHADOW E&M-EST. PATIENT-LVL III: ICD-10-PCS | Mod: PBBFAC,,, | Performed by: FAMILY MEDICINE

## 2021-12-13 PROCEDURE — 82607 VITAMIN B-12: CPT | Performed by: FAMILY MEDICINE

## 2021-12-13 PROCEDURE — 86803 HEPATITIS C AB TEST: CPT | Performed by: FAMILY MEDICINE

## 2021-12-13 PROCEDURE — 80061 LIPID PANEL: CPT | Performed by: FAMILY MEDICINE

## 2021-12-13 PROCEDURE — 80053 COMPREHEN METABOLIC PANEL: CPT | Performed by: FAMILY MEDICINE

## 2021-12-13 PROCEDURE — 84443 ASSAY THYROID STIM HORMONE: CPT | Performed by: FAMILY MEDICINE

## 2021-12-13 PROCEDURE — 82306 VITAMIN D 25 HYDROXY: CPT | Performed by: FAMILY MEDICINE

## 2021-12-14 ENCOUNTER — PATIENT MESSAGE (OUTPATIENT)
Dept: INTERNAL MEDICINE | Facility: CLINIC | Age: 36
End: 2021-12-14
Payer: COMMERCIAL

## 2021-12-14 LAB — HCV AB SERPL QL IA: NEGATIVE

## 2022-01-03 ENCOUNTER — PATIENT MESSAGE (OUTPATIENT)
Dept: GASTROENTEROLOGY | Facility: CLINIC | Age: 37
End: 2022-01-03
Payer: COMMERCIAL

## 2022-02-23 ENCOUNTER — OFFICE VISIT (OUTPATIENT)
Dept: URGENT CARE | Facility: CLINIC | Age: 37
End: 2022-02-23
Payer: COMMERCIAL

## 2022-02-23 VITALS
DIASTOLIC BLOOD PRESSURE: 65 MMHG | HEIGHT: 64 IN | SYSTOLIC BLOOD PRESSURE: 138 MMHG | TEMPERATURE: 99 F | OXYGEN SATURATION: 99 % | WEIGHT: 124 LBS | RESPIRATION RATE: 16 BRPM | HEART RATE: 98 BPM | BODY MASS INDEX: 21.17 KG/M2

## 2022-02-23 DIAGNOSIS — R10.2 PELVIC PAIN: Primary | ICD-10-CM

## 2022-02-23 DIAGNOSIS — A64 STD (FEMALE): ICD-10-CM

## 2022-02-23 DIAGNOSIS — N73.9 PID (PELVIC INFLAMMATORY DISEASE): ICD-10-CM

## 2022-02-23 DIAGNOSIS — R11.0 NAUSEA: ICD-10-CM

## 2022-02-23 LAB
B-HCG UR QL: NEGATIVE
BILIRUB UR QL STRIP: NEGATIVE
CTP QC/QA: YES
GLUCOSE UR QL STRIP: NEGATIVE
KETONES UR QL STRIP: POSITIVE
LEUKOCYTE ESTERASE UR QL STRIP: NEGATIVE
PH, POC UA: 5.5
POC BLOOD, URINE: POSITIVE
POC NITRATES, URINE: NEGATIVE
PROT UR QL STRIP: POSITIVE
SP GR UR STRIP: 1.02 (ref 1–1.03)
UROBILINOGEN UR STRIP-ACNC: POSITIVE (ref 0.1–1.1)

## 2022-02-23 PROCEDURE — 81003 URINALYSIS AUTO W/O SCOPE: CPT | Mod: QW,S$GLB,, | Performed by: FAMILY MEDICINE

## 2022-02-23 PROCEDURE — 87801 DETECT AGNT MULT DNA AMPLI: CPT | Performed by: FAMILY MEDICINE

## 2022-02-23 PROCEDURE — 81003 POCT URINALYSIS, DIPSTICK, AUTOMATED, W/O SCOPE: ICD-10-PCS | Mod: QW,S$GLB,, | Performed by: FAMILY MEDICINE

## 2022-02-23 PROCEDURE — 96372 PR INJECTION,THERAP/PROPH/DIAG2ST, IM OR SUBCUT: ICD-10-PCS | Mod: S$GLB,,, | Performed by: FAMILY MEDICINE

## 2022-02-23 PROCEDURE — 81025 URINE PREGNANCY TEST: CPT | Mod: S$GLB,,, | Performed by: FAMILY MEDICINE

## 2022-02-23 PROCEDURE — 96372 THER/PROPH/DIAG INJ SC/IM: CPT | Mod: S$GLB,,, | Performed by: FAMILY MEDICINE

## 2022-02-23 PROCEDURE — 81025 POCT URINE PREGNANCY: ICD-10-PCS | Mod: S$GLB,,, | Performed by: FAMILY MEDICINE

## 2022-02-23 PROCEDURE — 87481 CANDIDA DNA AMP PROBE: CPT | Mod: 59 | Performed by: FAMILY MEDICINE

## 2022-02-23 PROCEDURE — 99214 PR OFFICE/OUTPT VISIT, EST, LEVL IV, 30-39 MIN: ICD-10-PCS | Mod: 25,S$GLB,, | Performed by: FAMILY MEDICINE

## 2022-02-23 PROCEDURE — 99214 OFFICE O/P EST MOD 30 MIN: CPT | Mod: 25,S$GLB,, | Performed by: FAMILY MEDICINE

## 2022-02-23 PROCEDURE — 87086 URINE CULTURE/COLONY COUNT: CPT | Performed by: FAMILY MEDICINE

## 2022-02-23 RX ORDER — DOXYCYCLINE HYCLATE 100 MG
100 TABLET ORAL 2 TIMES DAILY
Qty: 28 TABLET | Refills: 0 | Status: SHIPPED | OUTPATIENT
Start: 2022-02-23 | End: 2022-02-23

## 2022-02-23 RX ORDER — METRONIDAZOLE 500 MG/1
500 TABLET ORAL EVERY 12 HOURS
Qty: 14 TABLET | Refills: 0 | Status: SHIPPED | OUTPATIENT
Start: 2022-02-23 | End: 2022-02-23

## 2022-02-23 RX ORDER — DOXYCYCLINE HYCLATE 100 MG
100 TABLET ORAL 2 TIMES DAILY
Qty: 28 TABLET | Refills: 0 | Status: SHIPPED | OUTPATIENT
Start: 2022-02-23 | End: 2022-03-09

## 2022-02-23 RX ORDER — METRONIDAZOLE 500 MG/1
500 TABLET ORAL EVERY 12 HOURS
Qty: 14 TABLET | Refills: 0 | Status: SHIPPED | OUTPATIENT
Start: 2022-02-23 | End: 2022-03-02

## 2022-02-23 RX ORDER — CEFTRIAXONE 500 MG/1
500 INJECTION, POWDER, FOR SOLUTION INTRAMUSCULAR; INTRAVENOUS
Status: COMPLETED | OUTPATIENT
Start: 2022-02-23 | End: 2022-02-23

## 2022-02-23 RX ADMIN — CEFTRIAXONE 500 MG: 500 INJECTION, POWDER, FOR SOLUTION INTRAMUSCULAR; INTRAVENOUS at 06:02

## 2022-02-23 NOTE — PROGRESS NOTES
"Subjective:       Patient ID: Sarah Lee is a 36 y.o. female.    Vitals:  height is 5' 4" (1.626 m) and weight is 56.2 kg (124 lb). Her temperature is 99.2 °F (37.3 °C). Her blood pressure is 138/65 and her pulse is 98. Her respiration is 16 and oxygen saturation is 99%.     Chief Complaint: Abdominal Cramping    Pt started having pelvic pain two days ago accompanied by urine frequency, nausea, fatigue and mild back pain for last few days. States the pain is located to pelvic areas both sides. Pain intensity is mild to moderate.  Patient reports recent unprotected sex and suspects an STD.  Denies fever, chills, dysuria, , diarrhea, constipation.  Denies any significant vaginal discharge.    Abdominal Cramping  The pain is located in the LLQ, RLQ, left flank and right flank. The pain is at a severity of 4/10. The pain is moderate. The quality of the pain is aching. The abdominal pain radiates to the back. Associated symptoms include frequency, headaches, myalgias and nausea. Pertinent negatives include no vomiting. Nothing aggravates the pain. The pain is relieved by nothing. She has tried nothing for the symptoms. The treatment provided no relief.       Gastrointestinal: Positive for nausea. Negative for vomiting.   Genitourinary: Positive for frequency.   Musculoskeletal: Positive for muscle ache.   Neurological: Positive for headaches.       Objective:      Physical Exam   Constitutional: She is oriented to person, place, and time. She appears well-developed. She is cooperative.  Non-toxic appearance. She does not appear ill. No distress.   HENT:   Head: Normocephalic and atraumatic.   Ears:   Right Ear: Hearing, tympanic membrane, external ear and ear canal normal. impacted cerumen  Left Ear: Hearing, tympanic membrane, external ear and ear canal normal. impacted cerumen  Nose: Nose normal. No mucosal edema, rhinorrhea, nasal deformity or congestion. No epistaxis. Right sinus exhibits no maxillary sinus " Patient janine fairchild [phone]   tenderness and no frontal sinus tenderness. Left sinus exhibits no maxillary sinus tenderness and no frontal sinus tenderness.   Mouth/Throat: Uvula is midline, oropharynx is clear and moist and mucous membranes are normal. No trismus in the jaw. Normal dentition. No uvula swelling. No oropharyngeal exudate or posterior oropharyngeal erythema.   Eyes: Conjunctivae and lids are normal. Right eye exhibits no discharge. Left eye exhibits no discharge. No scleral icterus.   Neck: Trachea normal and phonation normal. Neck supple.   Cardiovascular: Normal rate, regular rhythm, normal heart sounds and normal pulses.   Pulmonary/Chest: Effort normal and breath sounds normal. No respiratory distress.   Abdominal: Normal appearance and bowel sounds are normal. She exhibits no distension and no mass. Soft. flat abdomenThere is abdominal tenderness. There is no rebound, no guarding, no left CVA tenderness and no right CVA tenderness.      Comments: +ve TTP to pelvic bessie b/l. No RLQ TTP. BS +ve   Genitourinary:         Comments: Pt denied pelvic exam.      Musculoskeletal: Normal range of motion.         General: No deformity. Normal range of motion.      Cervical back: She exhibits no tenderness.   Lymphadenopathy:     She has no cervical adenopathy.   Neurological: She is alert and oriented to person, place, and time. She exhibits normal muscle tone. Coordination normal.   Skin: Skin is warm, dry, intact, not diaphoretic and not pale.   Psychiatric: Her speech is normal and behavior is normal. Judgment and thought content normal.   Nursing note and vitals reviewed.        Assessment:       1. Pelvic pain    2. Nausea    3. PID (pelvic inflammatory disease)    4. STD (female)          Plan:     Pt's symptoms are suggestive of mild PID. Discussed results/diagnosis/plan with patient in clinic. Advised to f/u with PCP within 2-5 days. Advised pt to f/u with OBGyn. Pt states she has her OPBGyn and will call for aptt.    ER  precautions given if symptoms get any worse. All questions answered. Patient verbally understood and agreed with treatment plan. advised pt to f/u with OBGyn. Pt states she has her OPBGyn and will call for aptt.     Pelvic pain  -     POCT Urinalysis, Dipstick, Automated, W/O Scope  -     POCT urine pregnancy  -     Urine culture  -     C. trachomatis/N. gonorrhoeae by AMP DNA [facility]; Vagina  -     Bv, Trich, Candida by DNA Probe (Swab Only)    Nausea  -     Urine culture    PID (pelvic inflammatory disease)    STD (female)    Other orders  -     cefTRIAXone injection 500 mg  -     Discontinue: doxycycline (VIBRA-TABS) 100 MG tablet; Take 1 tablet (100 mg total) by mouth 2 (two) times daily. for 14 days  Dispense: 28 tablet; Refill: 0  -     Discontinue: metroNIDAZOLE (FLAGYL) 500 MG tablet; Take 1 tablet (500 mg total) by mouth every 12 (twelve) hours. for 7 days  Dispense: 14 tablet; Refill: 0  -     doxycycline (VIBRA-TABS) 100 MG tablet; Take 1 tablet (100 mg total) by mouth 2 (two) times daily. for 14 days  Dispense: 28 tablet; Refill: 0  -     metroNIDAZOLE (FLAGYL) 500 MG tablet; Take 1 tablet (500 mg total) by mouth every 12 (twelve) hours. for 7 days  Dispense: 14 tablet; Refill: 0                Pelvic Pain Discharge Instructions   About this topic   Pelvic pain is pain below the belly button and above the legs. It may be acute and last a few hours or a few days or much longer. If the pain lasts longer than 3 months it is chronic pelvic pain. Your pain may be sharp, dull, or cramping. It may be there all the time or may come and go. Sometimes, the pain builds up over a short time. You may feel the pain throughout this area of your body or in one specific area. It may be mild, moderate, or severe.  Pain can cause upset stomach and throwing up. When you are in pain you may not feel hungry. You may feel nervous. Pain may be a warning sign that something is wrong inside the body. Acute pelvic pain may be  caused by a very serious health problem.  How your pelvic pain is treated is based on many things. Some of them are the kind of pain, how bad it is, and what is causing the pain. Treatment may include drugs or surgery.       What care is needed at home?   For some causes of pelvic pain, you may need to remain in bed or avoid sexual activity.  Ask your doctor what you need to do when you go home. Make sure you ask questions if you do not understand what the doctor says.  What follow-up care is needed?   Your doctor may ask you to make visits to the office to check on your progress. Be sure to keep these visits.  What drugs may be needed?   The doctor may order drugs to:  · Help with pain  · Fight an infection  · Balance hormones  · Relax muscles  · Lower stress or anxiety or help with depression  · Treat or prevent constipation  Will physical activity be limited?   Based on what is causing your pain, your activity may be limited. Talk with your doctor about the right amount of activity for you.  What can be done to prevent this health problem?   · There is nothing you can do to prevent some of these problems.  · Always practice safe sex by using condoms.  · Manage digestive problems like constipation.  When do I need to call the doctor?   · Signs of infection. These include a fever of 100.4°F (38°C) or higher, chills, pain, heavy discharge or bleeding or blood with passing urine.  · Very bad upset stomach, throwing up, belly pain, and not being able to eat or drink anything  · Very bad pelvic pain, sometimes even with standing or walking  · You are not feeling better in 2 to 3 days or you are feeling worse  Teach Back: Helping You Understand   The Teach Back Method helps you understand the information we are giving you. After you talk with the staff, tell them in your own words what you learned. This helps to make sure the staff has described each thing clearly. It also helps to explain things that may have been  confusing. Before going home, make sure you can do these:  · I can tell you about my pain.  · I can tell you what changes I need to make with my activities.  · I can tell you what I will do if I have very bad belly or pelvic pain.  Where can I learn more?   [facility] Choices  http://www.nhs.[place]/conditions/pelvic-pain/Pages/Introduction.aspx   Last Reviewed Date   2021-09-15  Consumer Information Use and Disclaimer   This information is not specific medical advice and does not replace information you receive from your health care provider. This is only a brief summary of general information. It does NOT include all information about conditions, illnesses, injuries, tests, procedures, treatments, therapies, discharge instructions or life-style choices that may apply to you. You must talk with your health care provider for complete information about your health and treatment options. This information should not be used to decide whether or not to accept your health care providers advice, instructions or recommendations. Only your health care provider has the knowledge and training to provide advice that is right for you.  Copyright   Copyright © 2021 [employer] and its affiliates and/or licensors. All rights reserved.               Pelvic Inflammatory Disease Discharge Instructions   About this topic   Pelvic inflammatory disease is also called PID. It is swelling of your reproductive organs. These organs are in the lower belly or pelvis. They are:  · Ovaries which store the eggs  · Fallopian tubes that connect the ovaries and the uterus  · Uterus or womb that holds a baby when a woman is pregnant  · Vagina which joins the uterus and the outside of the body  PID is caused by an infection you can catch when you have sex. If it is not treated, the organs can become swollen, sore, and scarred. It may leave you not able to have a baby from the scars on your fallopian tubes. The infection can spread to different areas in the  body and cause problems, if it is not treated.     What care is needed at home?   · Ask your doctor what you need to do when you go home. Make sure you ask questions if you do not understand what the doctor says. This way you will know what you need to do.  · Tell anyone you had sex with in the past 3 to 6 months to get tested. They may need treatment as well.  What follow-up care is needed?   · Your doctor may ask you to make visits to the office to check on your progress. Be sure to keep these visits.  · Your doctor will tell you if other tests are needed.  What drugs may be needed?   The doctor may order drugs to:  · Help with pain and swelling  · Fight an infection  Even if signs go away, finish taking all of your drugs as ordered. This is very important.  Will physical activity be limited?   · The pain caused by PID can change your daily routine.  · Sex may be painful.  · Do not have sex until you have finished the treatment and the doctor has told you it is safe to do so.  What problems could happen?   · Problems getting pregnant  · Chronic pelvic pain  · Abnormal menstrual periods  · Pregnancy outside of the womb. This is an ectopic pregnancy.  · PID could happen again.  · Infection can spread if not treated.  · Bladder infection  What can be done to prevent this health problem?   · Avoid douching. It can cause changes in your vagina that help germs grow.  · The only sure way to keep from getting or passing on a sexually-transmitted infection is to not have sexual contact with any person. This infection may be spread even if you do not have any signs of illness.  · Avoid contact with any sex partner known to have an infection.  · If you have sex, use latex condoms each time to lower spread of infection.  · Get a regular check-up for STDs.  When do I need to call the doctor?   · Signs of infection. These include a fever of 100.4°F (38°C) or higher, chills, very bad sore throat, pain with passing urine, blood  in urine, mouth sores, a wound that will not heal, or anal itching or pain.  · Smelly vaginal discharge  · Loose stools  · Throwing up that will not stop  Teach Back: Helping You Understand   The Teach Back Method helps you understand the information we are giving you. After you talk with the staff, tell them in your own words what you learned. This helps to make sure the staff has described each thing clearly. It also helps to explain things that may have been confusing. Before going home, make sure you can do these:  · I can tell you about my condition.  · I can tell you what may help prevent this health problem.  · I can tell you what I will do if I have a fever, chills, pain with passing urine, or smelly vaginal discharge.  Where can I learn more?   Centers for Disease Control and Prevention  https://www.cdc.gov/std/pid/stdfact-pid.htm   [employer]  http://kidshealth.org/parent/infections/std/pelvic_inflammatory_disease.html    Department of Health and Human Services  https://www.womenshealth.gov/a-z-topics/pelvic-inflammatory-disease   Last Reviewed Date   2021-05-19  Consumer Information Use and Disclaimer   This information is not specific medical advice and does not replace information you receive from your health care provider. This is only a brief summary of general information. It does NOT include all information about conditions, illnesses, injuries, tests, procedures, treatments, therapies, discharge instructions or life-style choices that may apply to you. You must talk with your health care provider for complete information about your health and treatment options. This information should not be used to decide whether or not to accept your health care providers advice, instructions or recommendations. Only your health care provider has the knowledge and training to provide advice that is right for you.  Copyright   Copyright © 2021 [employer] and its affiliates and/or licensors. All rights  reserved.               Sexually-Transmitted Diseases Discharge Instructions   About this topic   A sexually transmitted disease or STD is sometimes known as an STI or sexually transmitted infection. You get an STD from a bacteria, virus, or other germ during sex. Most STDs are passed from a person with the infection to someone else through sharing body fluids. This can happen through vaginal, anal, or oral sex or through other types of sex play.  Most STDs are easy to spread to others through sex or sharing body fluids. Pregnant people can also pass some infections to their baby. You may need antibiotics to treat your infection. If so, it is important to take all of your antibiotics even if you start to feel better. Be sure to follow the doctors orders about having your sex partners checked and treated as well.     What care is needed at home?   · Ask your doctor what you need to do when you go home. Make sure you ask questions if you do not understand what the doctor says.  · Take all your medicines as ordered.  · Before you have sex again, be sure:  ? You and your partner(s) have taken all your medicine.  ? You are free from infection.  · Get tested again after a few months. It is possible to get an STD again, even after treatment.  · You can lower your risk of getting and spreading STDs by:  ? Use latex condoms every time you have sex.  ? Limit the number of sex partners you have.  ? Avoid sharing sex toys with different sex partners. If you do share, wash each toy or cover them with a condom before use.  What follow-up care is needed?   · Your doctor may ask you to make visits to the office to check on your progress. Be sure to keep these visits.  · If you are infected, your partner should get tested too. Talk to your sex partner so that they can get tested. Make sure that your sex partners get treatment too.  · If you still have signs after your drugs, you may need to be tested again. You may have developed  drug resistance. Ask your doctor about it.  · You may need to be retested after your treatment. It is possible that you can have reinfection, or the infections might harm other parts of your body.  · Do not donate blood or other body parts.  · Tell all of your other doctors if you have an STD or are HIV-positive. This includes dentists and other specialists.  What drugs may be needed?   The doctor may order drugs to:  · Kill bacteria, viruses, and parasites  · Ease itching  · Help with pain and swelling  Will physical activity be limited?   · Talk to your doctor about kissing or having close intimate contact with your partner.  · Talk to your doctor about when it is safe to have sex again.  What problems could happen?   · Certain types of STDs may lead to cervical cancer and other cancers of the affected area.  · STDs may cause problems with your reproductive system. You may have swelling or trouble getting pregnant.  · Infection can spread to the heart, brain, and spinal cord  If you have an STD while you are pregnant, it may cause:  · Miscarriage  · The baby to be born early or have serious problems  · A baby may be born dead (stillbirth)  · You to need a  section to lower the chance of the baby getting the infection  What can be done to prevent this health problem?   · The only guaranteed way to keep from getting or passing on a sexually-transmitted infection is to not have sexual contact with anyone. This infection may be spread even if you do not have any signs of illness.  · If you are pregnant, get tested and get prompt treatment for gonorrhea infection. This will help avoid passing it to your baby.  · Get a regular check-up for STDs.  · Some types of STDs have vaccines to help prevent infections. Ask your doctor about them.  When do I need to call the doctor?   · You have sudden severe belly pain, or the belly pain is constant.  · Your belly becomes very hard or swollen.  · You get a high fever or  shaking chills, even though you have taken your medicine.  · You have a fever of 100.4°F (38°C) or higher or chills.  · You have any of these symptoms after you finish treatment:  ? Burning or pain when you pass urine  ? Discharge or foul smell from your genitals  ? Pain during sex  ? Vaginal or rectal itching  ? Rectal discharge or bleeding  ? Blisters, warts, rashes, or sores on your mouth, lips, or genitals  · You have pain with passing urine or have blood in your urine.  Teach Back: Helping You Understand   The Teach Back Method helps you understand the information we are giving you. After you talk with the staff, tell them in your own words what you learned. This helps to make sure the staff has described each thing clearly. It also helps to explain things that may have been confusing. Before going home, make sure you can do these:  · I can tell you about my condition.  · I can tell you how to keep from getting more STDs.  · I can tell you what I will do if I have a sore, pain, or burning in my genital area.  Where can I learn more?   [employer]  https://www.betterhealth.[place].gov.[place]/health/ConditionsAndTreatments/sexually-transmissible-infections-stis   Centers for Disease Control and Prevention  http://www.cdc.gov/std/default.htm   [facility]  http://kidshealth.org/parent/infections/std/talk_child_stds.html   Last Reviewed Date   2021-06-21  Consumer Information Use and Disclaimer   This information is not specific medical advice and does not replace information you receive from your health care provider. This is only a brief summary of general information. It does NOT include all information about conditions, illnesses, injuries, tests, procedures, treatments, therapies, discharge instructions or life-style choices that may apply to you. You must talk with your health care provider for complete information about your health and treatment options. This information should not be used to decide whether  or not to accept your health care providers advice, instructions or recommendations. Only your health care provider has the knowledge and training to provide advice that is right for you.  Copyright   Copyright © 2021 [employer] and its affiliates and/or licensors. All rights reserved.

## 2022-02-24 ENCOUNTER — TELEPHONE (OUTPATIENT)
Dept: OBSTETRICS AND GYNECOLOGY | Facility: CLINIC | Age: 37
End: 2022-02-24
Payer: COMMERCIAL

## 2022-02-24 ENCOUNTER — TELEPHONE (OUTPATIENT)
Dept: URGENT CARE | Facility: CLINIC | Age: 37
End: 2022-02-24
Payer: COMMERCIAL

## 2022-02-25 LAB — BACTERIA UR CULT: NORMAL

## 2022-02-25 NOTE — TELEPHONE ENCOUNTER
Called For follow-up.  Patient is feeling much better.  Advised to follow-up with PCP and OBGyn. Answered all questions.

## 2022-02-28 ENCOUNTER — TELEPHONE (OUTPATIENT)
Dept: URGENT CARE | Facility: CLINIC | Age: 37
End: 2022-02-28
Payer: COMMERCIAL

## 2022-03-02 LAB
BACTERIAL VAGINOSIS DNA: POSITIVE
CANDIDA GLABRATA DNA: NEGATIVE
CANDIDA KRUSEI DNA: NEGATIVE
CANDIDA RRNA VAG QL PROBE: NEGATIVE
T VAGINALIS RRNA GENITAL QL PROBE: NEGATIVE

## 2022-03-04 ENCOUNTER — TELEPHONE (OUTPATIENT)
Dept: URGENT CARE | Facility: CLINIC | Age: 37
End: 2022-03-04

## 2022-03-11 ENCOUNTER — TELEPHONE (OUTPATIENT)
Dept: URGENT CARE | Facility: CLINIC | Age: 37
End: 2022-03-11
Payer: COMMERCIAL

## 2022-03-12 ENCOUNTER — TELEPHONE (OUTPATIENT)
Dept: URGENT CARE | Facility: CLINIC | Age: 37
End: 2022-03-12
Payer: COMMERCIAL

## 2022-03-12 NOTE — TELEPHONE ENCOUNTER
Second attempt to contact patient regarding her lab results from previous visit 02/23/2022.  Patient did not answer the phone.  Voicemail was left her to contact Clinic.  She has not reviewed her vaginal swab results via my chart.  Positive for BV and was treated appropriately on previous visit.  Urine culture negative.  Was recommended to follow-up PCP and OBGYN if she had any other issues on previous visit.    Results for orders placed or performed in visit on 02/23/22   Urine culture    Specimen: Urine   Result Value Ref Range    Urine Culture, Routine No significant growth    Bv, Trich, Candida by DNA Probe (Swab Only)    Specimen: Vagina; Genital   Result Value Ref Range    Trichomonas vaginalis Negative Negative    Candida sp Negative Negative    Candida glabrata DNA Negative Negative    Candida krusei DNA Negative Negative    Bacterial vaginosis DNA Positive (A) Negative   POCT Urinalysis, Dipstick, Automated, W/O Scope   Result Value Ref Range    POC Blood, Urine Positive (A) Negative    POC Bilirubin, Urine Negative Negative    POC Urobilinogen, Urine Positive 0.1 - 1.1    POC Ketones, Urine Positive (A) Negative    POC Protein, Urine Positive (A) Negative    POC Nitrates, Urine Negative Negative    POC Glucose, Urine Negative Negative    pH, UA 5.5     POC Specific Gravity, Urine 1.025 1.003 - 1.029    POC Leukocytes, Urine Negative Negative   POCT urine pregnancy   Result Value Ref Range    POC Preg Test, Ur Negative Negative     Acceptable Yes

## 2022-03-12 NOTE — TELEPHONE ENCOUNTER
With temperature.  BV noted on vaginal swab.  Patient treated appropriately at time of visit.  Third attempt to call patient.  Left message for callback.

## 2022-03-23 ENCOUNTER — OFFICE VISIT (OUTPATIENT)
Dept: URGENT CARE | Facility: CLINIC | Age: 37
End: 2022-03-23
Payer: COMMERCIAL

## 2022-03-23 VITALS
HEIGHT: 64 IN | SYSTOLIC BLOOD PRESSURE: 130 MMHG | RESPIRATION RATE: 17 BRPM | DIASTOLIC BLOOD PRESSURE: 77 MMHG | OXYGEN SATURATION: 100 % | WEIGHT: 124 LBS | BODY MASS INDEX: 21.17 KG/M2 | TEMPERATURE: 99 F | HEART RATE: 94 BPM

## 2022-03-23 DIAGNOSIS — N92.6 MISSED PERIOD: Primary | ICD-10-CM

## 2022-03-23 DIAGNOSIS — S46.811A STRAIN OF RIGHT TRAPEZIUS MUSCLE, INITIAL ENCOUNTER: ICD-10-CM

## 2022-03-23 DIAGNOSIS — Z3A.01 LESS THAN 8 WEEKS GESTATION OF PREGNANCY: ICD-10-CM

## 2022-03-23 LAB
B-HCG UR QL: POSITIVE
CTP QC/QA: YES

## 2022-03-23 PROCEDURE — 99213 OFFICE O/P EST LOW 20 MIN: CPT | Mod: S$GLB,,, | Performed by: PHYSICIAN ASSISTANT

## 2022-03-23 PROCEDURE — 99213 PR OFFICE/OUTPT VISIT, EST, LEVL III, 20-29 MIN: ICD-10-PCS | Mod: S$GLB,,, | Performed by: PHYSICIAN ASSISTANT

## 2022-03-23 PROCEDURE — 81025 POCT URINE PREGNANCY: ICD-10-PCS | Mod: S$GLB,,, | Performed by: PHYSICIAN ASSISTANT

## 2022-03-23 PROCEDURE — 81025 URINE PREGNANCY TEST: CPT | Mod: S$GLB,,, | Performed by: PHYSICIAN ASSISTANT

## 2022-03-23 NOTE — PATIENT INSTRUCTIONS
- Rest.    - Drink plenty of fluids.    - Acetaminophen (tylenol) as directed as needed for pain. Avoid tylenol if you have a history of liver disease. Do not take ibuprofen or aleve.     - low heat to affected area can help    - establish care with obgyn. You can call your OBGYN, or call [phone] to schedule appointment through referral.     - begin prenatal vitamin daily    - Follow up with your PCP or specialty clinic as directed in the next 1-2 weeks if not improved or as needed.  You can call [phone] to schedule an appointment with the appropriate provider.    - Go to the ER or seek medical attention immediately if you develop new or worsening symptoms.     - You must understand that you have received an Urgent Care treatment only and that you may be released before all of your medical problems are known or treated.   - You, the patient, will arrange for follow up care as instructed.   - If your condition worsens or fails to improve we recommend that you receive another evaluation at the ER immediately or contact your PCP to discuss your concerns or return here.

## 2022-03-23 NOTE — PROGRESS NOTES
"Subjective:       Patient ID: Sarah Lee is a 36 y.o. female.    Vitals:  height is 5' 4.02" (1.626 m) and weight is 56.2 kg (124 lb). Her oral temperature is 98.8 °F (37.1 °C). Her blood pressure is 130/77 and her pulse is 94. Her respiration is 17 and oxygen saturation is 100%.     Chief Complaint: Neck Pain    Patient presents for UPT. Pt reports that took a home UPT test this morning with a positive result, patient would like to be retested here. LMP 2/20/22. No abdominal pain, bleeding.      Patient also reports pain (stiffness) to right side of neck/upper back/shoulder that began yesterday.  No injury or trauma. No hx neck surgery or major injury. No swelling or rash. Pt notes that it feels stiff, thinks it may be the way that she lies/sleeps. No numbness, tingling, weakness. Aggravated by ROM of neck. Pt states pain is mild, feels more stiff. Wanted to get it checked out because she was already here for upt. No fever.     Other  This is a new problem. The current episode started yesterday. Associated symptoms include neck pain. Pertinent negatives include no abdominal pain, arthralgias, chest pain, chills, congestion, coughing, diaphoresis, fatigue, fever, headaches, nausea, numbness, rash, sore throat, vomiting or weakness. Nothing aggravates the symptoms. She has tried acetaminophen for the symptoms.       Constitution: Negative for chills, sweating, fatigue and fever.   HENT: Negative for ear pain, congestion and sore throat.    Neck: Positive for neck pain. Negative for neck stiffness and neck swelling.   Cardiovascular: Negative for chest pain, leg swelling, palpitations and sob on exertion.   Eyes: Negative for eye itching, eye pain, eye redness and photophobia.   Respiratory: Negative for cough, sputum production and shortness of breath.    Gastrointestinal: Negative for abdominal pain, nausea, vomiting and diarrhea.   Genitourinary: Negative for dysuria, frequency, urgency, flank pain and " hematuria.   Musculoskeletal: Positive for pain. Negative for trauma, joint pain, abnormal ROM of joint and back pain.   Skin: Negative for color change and rash.   Neurological: Negative for dizziness, light-headedness, headaches, disorientation, numbness and tingling.   Psychiatric/Behavioral: Negative for disorientation.       Objective:      Physical Exam   Constitutional: She is oriented to person, place, and time. She appears well-developed. She is cooperative.  Non-toxic appearance. She does not appear ill. No distress.   HENT:   Head: Normocephalic and atraumatic.   Nose: Nose normal.   Mouth/Throat: Oropharynx is clear and moist and mucous membranes are normal.   Eyes: Conjunctivae and lids are normal.   Neck: Trachea normal and phonation normal. Neck supple.      Comments: Full ROM. No midline ttp.    Cardiovascular: Normal rate, regular rhythm, normal heart sounds and normal pulses.   Pulmonary/Chest: Effort normal and breath sounds normal.   Abdominal: Normal appearance and bowel sounds are normal. She exhibits no abdominal bruit, no pulsatile midline mass and no mass. Soft.   Musculoskeletal:         General: No deformity.      Thoracic back: Normal.      Lumbar back: Normal.        Back:    Neurological: She is alert and oriented to person, place, and time. She has normal strength and normal reflexes. No sensory deficit.   Skin: Skin is warm, dry, intact and not diaphoretic.   Psychiatric: Her speech is normal and behavior is normal. Judgment and thought content normal.   Nursing note and vitals reviewed.          Results for orders placed or performed in visit on 03/23/22   POCT urine pregnancy   Result Value Ref Range    POC Preg Test, Ur Positive (A) Negative     Acceptable Yes        Assessment:       1. Missed period    2. Less than 8 weeks gestation of pregnancy    3. Strain of right trapezius muscle, initial encounter          Plan:       Instructed follow up with obgyn to  establish care, notify obgyn of pregnancy, likely initial visit week 8. Avoid nsaid. Ask obgyn before beginning any new medications. Begin prenatal. Shoulder stiffness appears muscular, discussed home care, treatment options.   Missed period  -     POCT urine pregnancy    Less than 8 weeks gestation of pregnancy  -     Ambulatory referral/consult to Obstetrics / Gynecology    Strain of right trapezius muscle, initial encounter      Patient Instructions   - Rest.    - Drink plenty of fluids.    - Acetaminophen (tylenol) as directed as needed for pain. Avoid tylenol if you have a history of liver disease. Do not take ibuprofen or aleve.     - low heat to affected area can help    - establish care with obgyn. You can call your OBGYN, or call [phone] to schedule appointment through referral.     - begin prenatal vitamin daily    - Follow up with your PCP or specialty clinic as directed in the next 1-2 weeks if not improved or as needed.  You can call [phone] to schedule an appointment with the appropriate provider.    - Go to the ER or seek medical attention immediately if you develop new or worsening symptoms.     - You must understand that you have received an Urgent Care treatment only and that you may be released before all of your medical problems are known or treated.   - You, the patient, will arrange for follow up care as instructed.   - If your condition worsens or fails to improve we recommend that you receive another evaluation at the ER immediately or contact your PCP to discuss your concerns or return here.

## 2022-03-25 ENCOUNTER — TELEPHONE (OUTPATIENT)
Dept: URGENT CARE | Facility: CLINIC | Age: 37
End: 2022-03-25
Payer: COMMERCIAL

## 2022-04-04 ENCOUNTER — NURSE TRIAGE (OUTPATIENT)
Dept: ADMINISTRATIVE | Facility: CLINIC | Age: 37
End: 2022-04-04
Payer: COMMERCIAL

## 2022-04-04 NOTE — TELEPHONE ENCOUNTER
OOC Rn  Patient Swelling  in left foot, x 2-3 days.  5-6 weeks pregnant.   Tingling feet, aches and pains in shoulder.   Dr. cole Baptiste.  Aches in shoulder and neck.  Care advise go to nearest ED now.   For any new or worsening symptoms.   Call me OOC RN.     Reason for Disposition   Thigh, calf, or ankle swelling in both legs, but one side is definitely more swollen    Additional Information   Negative: SEVERE difficulty breathing (e.g., struggling for each breath, speaks in single words)   Negative: Sounds like a life-threatening emergency to the triager   Negative: SEVERE leg swelling (e.g., swelling extends above knee, entire leg is swollen)   Negative: Chest pain   Negative: Difficulty breathing of new-onset or worsening   Negative: Pregnant 20 or more weeks AND blurred vision or visual changes   Negative: Pregnant 20 or more weeks AND severe headache and not relieved with acetaminophen (e.g., Tylenol)   Negative: Pregnant > 24 weeks and baby is moving less today (e.g., kick count < 5 in 1 hour or < 10 in 2 hours)   Negative: Pregnant 20 or more weeks and upper abdominal pain lasting > 1 hour   Negative: Pregnant 20 or more weeks and new hand or face swelling   Negative: Thigh or calf pain and only 1 side and present > 1 hour   Negative: Thigh, calf, or ankle swelling in only one leg    Protocols used: PREGNANCY - LEG SWELLING AND EDEMA-A-OH

## 2022-04-09 ENCOUNTER — NURSE TRIAGE (OUTPATIENT)
Dept: ADMINISTRATIVE | Facility: CLINIC | Age: 37
End: 2022-04-09
Payer: COMMERCIAL

## 2022-04-10 NOTE — TELEPHONE ENCOUNTER
Reason for Disposition   Last bowel movement (BM) > 4 days ago    Additional Information   Negative: [1] Abdomen pain AND [2] pregnant 20 or more weeks   Negative: [1] Abdomen pain AND [2] pregnant < 20 weeks   Negative: Rectal bleeding or blood in stool is main symptom   Negative: Leakage of fluid from vagina   Negative: [1] Pregnant 23 or more weeks AND [2] baby is moving less today (e.g., kick count < 5 in 1 hour or < 10 in 2 hours)   Negative: Severe rectal pain   Negative: Patient sounds very sick or weak to the triager   Negative: [1] Vomiting AND [2] abdomen looks much more swollen than usual   Negative: [1] Vomiting AND [2] contains bile (green color)    Protocols used: PREGNANCY - CONSTIPATION-A-AH  6 weeks preg.   pt called re what can she take for constipation?. constipated x 2 weeks. last BM was today but pt admits she only passed a tiny piece. last formed stool two weeks ago. denies abd pain +some flatus. some nausea, no vomting. rec to see dr within 24 hours. Pt offered and accepted O[facility]. Call back with questions

## 2022-04-26 ENCOUNTER — LAB VISIT (OUTPATIENT)
Dept: LAB | Facility: OTHER | Age: 37
End: 2022-04-26
Attending: OBSTETRICS & GYNECOLOGY
Payer: COMMERCIAL

## 2022-04-26 ENCOUNTER — PROCEDURE VISIT (OUTPATIENT)
Dept: OBSTETRICS AND GYNECOLOGY | Facility: CLINIC | Age: 37
End: 2022-04-26
Payer: COMMERCIAL

## 2022-04-26 ENCOUNTER — INITIAL PRENATAL (OUTPATIENT)
Dept: OBSTETRICS AND GYNECOLOGY | Facility: CLINIC | Age: 37
End: 2022-04-26
Payer: COMMERCIAL

## 2022-04-26 DIAGNOSIS — Z87.59 HISTORY OF POSTPARTUM DEPRESSION: ICD-10-CM

## 2022-04-26 DIAGNOSIS — O09.529 ANTEPARTUM MULTIGRAVIDA OF ADVANCED MATERNAL AGE: ICD-10-CM

## 2022-04-26 DIAGNOSIS — Z86.59 HISTORY OF POSTPARTUM DEPRESSION: ICD-10-CM

## 2022-04-26 DIAGNOSIS — O21.9 NAUSEA/VOMITING IN PREGNANCY: ICD-10-CM

## 2022-04-26 DIAGNOSIS — O09.529 ANTEPARTUM MULTIGRAVIDA OF ADVANCED MATERNAL AGE: Primary | ICD-10-CM

## 2022-04-26 LAB
ABO + RH BLD: NORMAL
BASOPHILS # BLD AUTO: 0.04 K/UL (ref 0–0.2)
BASOPHILS NFR BLD: 0.4 % (ref 0–1.9)
BLD GP AB SCN CELLS X3 SERPL QL: NORMAL
DIFFERENTIAL METHOD: ABNORMAL
EOSINOPHIL # BLD AUTO: 0.2 K/UL (ref 0–0.5)
EOSINOPHIL NFR BLD: 1.4 % (ref 0–8)
ERYTHROCYTE [DISTWIDTH] IN BLOOD BY AUTOMATED COUNT: 13 % (ref 11.5–14.5)
HCT VFR BLD AUTO: 36.9 % (ref 37–48.5)
HGB BLD-MCNC: 12.5 G/DL (ref 12–16)
IMM GRANULOCYTES # BLD AUTO: 0.04 K/UL (ref 0–0.04)
IMM GRANULOCYTES NFR BLD AUTO: 0.4 % (ref 0–0.5)
LYMPHOCYTES # BLD AUTO: 1.3 K/UL (ref 1–4.8)
LYMPHOCYTES NFR BLD: 12.3 % (ref 18–48)
MCH RBC QN AUTO: 28.4 PG (ref 27–31)
MCHC RBC AUTO-ENTMCNC: 33.9 G/DL (ref 32–36)
MCV RBC AUTO: 84 FL (ref 82–98)
MONOCYTES # BLD AUTO: 0.8 K/UL (ref 0.3–1)
MONOCYTES NFR BLD: 7.5 % (ref 4–15)
NEUTROPHILS # BLD AUTO: 8.5 K/UL (ref 1.8–7.7)
NEUTROPHILS NFR BLD: 78 % (ref 38–73)
NRBC BLD-RTO: 0 /100 WBC
PLATELET # BLD AUTO: 288 K/UL (ref 150–450)
PMV BLD AUTO: 9.8 FL (ref 9.2–12.9)
RBC # BLD AUTO: 4.4 M/UL (ref 4–5.4)
WBC # BLD AUTO: 10.84 K/UL (ref 3.9–12.7)

## 2022-04-26 PROCEDURE — 86787 VARICELLA-ZOSTER ANTIBODY: CPT | Performed by: OBSTETRICS & GYNECOLOGY

## 2022-04-26 PROCEDURE — 87077 CULTURE AEROBIC IDENTIFY: CPT | Performed by: OBSTETRICS & GYNECOLOGY

## 2022-04-26 PROCEDURE — 87086 URINE CULTURE/COLONY COUNT: CPT | Performed by: OBSTETRICS & GYNECOLOGY

## 2022-04-26 PROCEDURE — 87340 HEPATITIS B SURFACE AG IA: CPT | Performed by: OBSTETRICS & GYNECOLOGY

## 2022-04-26 PROCEDURE — 87624 HPV HI-RISK TYP POOLED RSLT: CPT | Performed by: OBSTETRICS & GYNECOLOGY

## 2022-04-26 PROCEDURE — 99999 PR PBB SHADOW E&M-EST. PATIENT-LVL II: CPT | Mod: PBBFAC,,, | Performed by: OBSTETRICS & GYNECOLOGY

## 2022-04-26 PROCEDURE — 0500F PR INITIAL PRENATAL CARE VISIT: ICD-10-PCS | Mod: S$GLB,,, | Performed by: OBSTETRICS & GYNECOLOGY

## 2022-04-26 PROCEDURE — 87186 SC STD MICRODIL/AGAR DIL: CPT | Performed by: OBSTETRICS & GYNECOLOGY

## 2022-04-26 PROCEDURE — 87591 N.GONORRHOEAE DNA AMP PROB: CPT | Performed by: OBSTETRICS & GYNECOLOGY

## 2022-04-26 PROCEDURE — 86592 SYPHILIS TEST NON-TREP QUAL: CPT | Performed by: OBSTETRICS & GYNECOLOGY

## 2022-04-26 PROCEDURE — 99999 PR PBB SHADOW E&M-EST. PATIENT-LVL II: ICD-10-PCS | Mod: PBBFAC,,, | Performed by: OBSTETRICS & GYNECOLOGY

## 2022-04-26 PROCEDURE — 88175 CYTOPATH C/V AUTO FLUID REDO: CPT | Performed by: OBSTETRICS & GYNECOLOGY

## 2022-04-26 PROCEDURE — 76801 OB US < 14 WKS SINGLE FETUS: CPT | Mod: S$GLB,,, | Performed by: OBSTETRICS & GYNECOLOGY

## 2022-04-26 PROCEDURE — 86803 HEPATITIS C AB TEST: CPT | Performed by: OBSTETRICS & GYNECOLOGY

## 2022-04-26 PROCEDURE — 87088 URINE BACTERIA CULTURE: CPT | Performed by: OBSTETRICS & GYNECOLOGY

## 2022-04-26 PROCEDURE — 76801 US OB/GYN PROCEDURE (VIEWPOINT): ICD-10-PCS | Mod: S$GLB,,, | Performed by: OBSTETRICS & GYNECOLOGY

## 2022-04-26 PROCEDURE — 86762 RUBELLA ANTIBODY: CPT | Performed by: OBSTETRICS & GYNECOLOGY

## 2022-04-26 PROCEDURE — 0500F INITIAL PRENATAL CARE VISIT: CPT | Mod: S$GLB,,, | Performed by: OBSTETRICS & GYNECOLOGY

## 2022-04-26 PROCEDURE — 85025 COMPLETE CBC W/AUTO DIFF WBC: CPT | Performed by: OBSTETRICS & GYNECOLOGY

## 2022-04-26 PROCEDURE — 87389 HIV-1 AG W/HIV-1&-2 AB AG IA: CPT | Performed by: OBSTETRICS & GYNECOLOGY

## 2022-04-26 PROCEDURE — 86850 RBC ANTIBODY SCREEN: CPT | Performed by: OBSTETRICS & GYNECOLOGY

## 2022-04-26 PROCEDURE — 87491 CHLMYD TRACH DNA AMP PROBE: CPT | Performed by: OBSTETRICS & GYNECOLOGY

## 2022-04-26 RX ORDER — ONDANSETRON 4 MG/1
4 TABLET, ORALLY DISINTEGRATING ORAL EVERY 6 HOURS PRN
Qty: 30 TABLET | Refills: 2 | Status: SHIPPED | OUTPATIENT
Start: 2022-04-26 | End: 2023-04-03

## 2022-04-26 NOTE — PROGRESS NOTES
Reason for visit: Initial Prenatal Visit      HPI:    36 y.o. female  presents for confirmation of pregnancy or to establish OB care. This pregnancy is planned. No mood issues. Discontinued depression medications 8-9 months ago.    New patient: Prior patient of Dr. Teran.    Patient's last menstrual period was 2022.    Nausea:  Yes  Vomiting: No  Cramping: No  Bleeding:  No    Denies family history of bleeding disorders, birth defects, or mental disability  Prior Pre-E with past 2 deliveries and was induced at 37 weeks; both    Last delivery c/b severe post partum depression    PMedHx: gastic ulcer w/ upper GI bleed    Reviewed:    Pap: 2015 -NILM    Past medical, surgical, social, and family history: Reviewed and updated in EMR.  Medications: Reviewed and updated in EMR.  Allergies: Patient has no known allergies.    OB History    Para Term  AB Living   4 2 1 1 1 2   SAB IAB Ectopic Multiple Live Births     1   0 2      # Outcome Date GA Lbr Alin/2nd Weight Sex Delivery Anes PTL Lv   4 Current            3 Term 17 37w1d / 00:30 2.92 kg (6 lb 7 oz) M Vag-Forceps EPI N WILLIAM      Complications: Shoulder Dystocia   2  / 36w6d 10:00 / 00:31 2.478 kg (5 lb 7.4 oz) M Vag-Spont EPI Y WILLIAM      Complications: Pre-eclampsia   1 IAB  7w0d             Birth Comments: D&E       Prior records and results: reviewed  Outside records: n/a  Independent interpretation of tests: n/a  Discussion with another healthcare professional: n/a      Vitals: LMP 2022     Physical Exam  Genitourinary:      Vulva normal.      Right Labia: No rash, lesions or Bartholin's cyst.     Left Labia: No lesions, Bartholin's cyst or rash.     No vaginal discharge or bleeding.        Right Adnexa: not tender and not full.     Left Adnexa: not tender and not full.     No cervical motion tenderness, discharge or lesion.      Uterus is not enlarged or tender.      Pelvic exam was performed with patient in  the lithotomy position.   Exam conducted with a chaperone present.           Assessment and Plan:     Antepartum multigravida of advanced maternal age  -     Liquid-Based Pap Smear, Screening  -     HPV High Risk Genotypes, PCR  -     C. trachomatis/N. gonorrhoeae by AMP DNA  -     Urine culture  -     Type & Screen; Future; Expected date: 04/26/2022  -     CBC Auto Differential; Future; Expected date: 04/26/2022  -     Hepatitis B Surface Antigen; Future; Expected date: 04/26/2022  -     RPR; Future; Expected date: 04/26/2022  -     HIV 1/2 Ag/Ab (4th Gen); Future; Expected date: 04/26/2022  -     Hepatitis C Antibody; Future; Expected date: 04/26/2022  -     Varicella Zoster Antibody, IgG; Future; Expected date: 04/26/2022  -     Rubella Antibody, IgG; Future; Expected date: 04/26/2022  -     US OB/GYN Procedure ([facility]); Future    Nausea/vomiting in pregnancy  -     ondansetron (ZOFRAN-ODT) 4 MG TbDL; Take 1 tablet (4 mg total) by mouth every 6 (six) hours as needed (Nausea and vomiting).  Dispense: 30 tablet; Refill: 2    History of postpartum depression         UPT positive  o Dating  - LMP=2/23/22--> PRANAV=11/30/22 --> EGA=8w6d  - Dating u/s ordered  o Initial prenatal labs -ordered  o Aneuploidy screening: patient desires, will perform after dating US and 10 wk  o PNV - pt taking  -  Recommended baby aspirin starting at 12wk due to hx of preE, will check that it is safe with hx of gastric ulcers  -  Mood good at this time. States she does not need any meds. CTM closely.      NEW PREGNANCY COUNSELING  Patient was counseled today on:  - Routine prenatal blood tests including HIV and anticipated course of prenatal care  - Prenatal vitamins and folic acid  - Weight gain, nutrition, and exercise  - Seafood and mercury  - Properly heating deli and prepared meats and avoiding unrefrigerated deli  meats, cheeses, and milk products,   - Avoiding cat litter and raw meats due to risk of Toxoplasmosis precautions   -  Accuracy of the LMP-based PRANAV and the value of an early TV-u/s  - Aneuploidy and neural tube screening -- cffDNA, sequential screening, and AFP screen at 15 weeks  - OTC medication in the first trimester  - Harmful effects of smoking, etOH, and recreational drugs  - MFM u/s  at 18-20 weeks.  - Common complaints of pregnancy  - Seat belt use  - Childbirth classes and hospital facilities  - All questions were answered    - Pain and bleeding precautions given    - Return to clinic in 4 weeks      Total time of 40 minutes, including face-to-face time and non-face-to-face time preparing to see the patient (eg, review of tests), obtaining and/or reviewing separately obtained history, documenting clinical information in the electronic or other health record, independently interpreting results, communicating results to the patient/family/caregiver, or care coordination.    Dulce Hollingsworth MS3    Seen and examined.  Agree with note.  All questions answered  ALEX Baptiste MD

## 2022-04-27 ENCOUNTER — PATIENT MESSAGE (OUTPATIENT)
Dept: OBSTETRICS AND GYNECOLOGY | Facility: CLINIC | Age: 37
End: 2022-04-27
Payer: COMMERCIAL

## 2022-04-27 LAB
RPR SER QL: NORMAL
RUBV IGG SER-ACNC: 30.6 IU/ML
RUBV IGG SER-IMP: REACTIVE

## 2022-04-28 LAB
BACTERIA UR CULT: ABNORMAL
C TRACH DNA SPEC QL NAA+PROBE: NOT DETECTED
N GONORRHOEA DNA SPEC QL NAA+PROBE: NOT DETECTED
VARICELLA INTERPRETATION: POSITIVE
VARICELLA ZOSTER IGG: 3.29 ISR (ref 0–0.9)

## 2022-04-29 LAB
HBV SURFACE AG SERPL QL IA: NEGATIVE
HCV AB SERPL QL IA: NEGATIVE

## 2022-05-02 LAB — HIV 1+2 AB+HIV1 P24 AG SERPL QL IA: NEGATIVE

## 2022-05-03 LAB
HPV HR 12 DNA SPEC QL NAA+PROBE: NEGATIVE
HPV16 AG SPEC QL: NEGATIVE
HPV18 DNA SPEC QL NAA+PROBE: NEGATIVE

## 2022-05-05 LAB
FINAL PATHOLOGIC DIAGNOSIS: NORMAL
Lab: NORMAL

## 2022-05-16 ENCOUNTER — PATIENT MESSAGE (OUTPATIENT)
Dept: ADMINISTRATIVE | Facility: OTHER | Age: 37
End: 2022-05-16
Payer: COMMERCIAL

## 2022-05-16 ENCOUNTER — TELEPHONE (OUTPATIENT)
Dept: OBSTETRICS AND GYNECOLOGY | Facility: CLINIC | Age: 37
End: 2022-05-16
Payer: COMMERCIAL

## 2022-05-16 NOTE — TELEPHONE ENCOUNTER
----- Message from Ying Johnson sent at 5/16/2022 10:24 AM CDT -----  Regarding: results  Name of Who is Calling:  KATHLENE CHAVEZ [[id number]]        What is the request in detail: Patient is requesting a c all back in reference to test results           Can the clinic reply by [facility]: no           What Number to Call Back if not in [facility]:[phone]

## 2022-05-18 ENCOUNTER — PATIENT MESSAGE (OUTPATIENT)
Dept: OBSTETRICS AND GYNECOLOGY | Facility: CLINIC | Age: 37
End: 2022-05-18
Payer: COMMERCIAL

## 2022-05-23 ENCOUNTER — PATIENT MESSAGE (OUTPATIENT)
Dept: ADMINISTRATIVE | Facility: OTHER | Age: 37
End: 2022-05-23
Payer: COMMERCIAL

## 2022-05-25 ENCOUNTER — ROUTINE PRENATAL (OUTPATIENT)
Dept: OBSTETRICS AND GYNECOLOGY | Facility: CLINIC | Age: 37
End: 2022-05-25
Payer: COMMERCIAL

## 2022-05-25 VITALS
DIASTOLIC BLOOD PRESSURE: 76 MMHG | WEIGHT: 138.88 LBS | SYSTOLIC BLOOD PRESSURE: 110 MMHG | BODY MASS INDEX: 23.83 KG/M2

## 2022-05-25 DIAGNOSIS — O09.299 HX OF PREECLAMPSIA, PRIOR PREGNANCY, CURRENTLY PREGNANT: Primary | ICD-10-CM

## 2022-05-25 DIAGNOSIS — Z34.82 ENCOUNTER FOR SUPERVISION OF OTHER NORMAL PREGNANCY IN SECOND TRIMESTER: ICD-10-CM

## 2022-05-25 PROBLEM — F41.8 POSTPARTUM ANXIETY: Status: RESOLVED | Noted: 2018-05-14 | Resolved: 2022-05-25

## 2022-05-25 PROCEDURE — 99999 PR PBB SHADOW E&M-EST. PATIENT-LVL II: CPT | Mod: PBBFAC,,, | Performed by: ADVANCED PRACTICE MIDWIFE

## 2022-05-25 PROCEDURE — 99999 PR PBB SHADOW E&M-EST. PATIENT-LVL II: ICD-10-PCS | Mod: PBBFAC,,, | Performed by: ADVANCED PRACTICE MIDWIFE

## 2022-05-25 PROCEDURE — 0502F SUBSEQUENT PRENATAL CARE: CPT | Mod: S$GLB,,, | Performed by: ADVANCED PRACTICE MIDWIFE

## 2022-05-25 PROCEDURE — 0502F PR SUBSEQUENT PRENATAL CARE: ICD-10-PCS | Mod: S$GLB,,, | Performed by: ADVANCED PRACTICE MIDWIFE

## 2022-05-25 RX ORDER — NAPROXEN SODIUM 220 MG/1
81 TABLET, FILM COATED ORAL DAILY
Qty: 60 TABLET | Refills: 4 | Status: SHIPPED | OUTPATIENT
Start: 2022-05-25 | End: 2023-05-25

## 2022-05-26 ENCOUNTER — PATIENT MESSAGE (OUTPATIENT)
Dept: MATERNAL FETAL MEDICINE | Facility: CLINIC | Age: 37
End: 2022-05-26
Payer: COMMERCIAL

## 2022-05-26 ENCOUNTER — TELEPHONE (OUTPATIENT)
Dept: MATERNAL FETAL MEDICINE | Facility: CLINIC | Age: 37
End: 2022-05-26
Payer: COMMERCIAL

## 2022-06-22 ENCOUNTER — ROUTINE PRENATAL (OUTPATIENT)
Dept: OBSTETRICS AND GYNECOLOGY | Facility: CLINIC | Age: 37
End: 2022-06-22
Payer: COMMERCIAL

## 2022-06-22 VITALS — BODY MASS INDEX: 24.96 KG/M2 | SYSTOLIC BLOOD PRESSURE: 122 MMHG | DIASTOLIC BLOOD PRESSURE: 84 MMHG | WEIGHT: 145.5 LBS

## 2022-06-22 DIAGNOSIS — O09.529 ANTEPARTUM MULTIGRAVIDA OF ADVANCED MATERNAL AGE: Primary | ICD-10-CM

## 2022-06-22 PROCEDURE — 99999 PR PBB SHADOW E&M-EST. PATIENT-LVL II: ICD-10-PCS | Mod: PBBFAC,,, | Performed by: OBSTETRICS & GYNECOLOGY

## 2022-06-22 PROCEDURE — 0502F SUBSEQUENT PRENATAL CARE: CPT | Mod: S$GLB,,, | Performed by: OBSTETRICS & GYNECOLOGY

## 2022-06-22 PROCEDURE — 0502F PR SUBSEQUENT PRENATAL CARE: ICD-10-PCS | Mod: S$GLB,,, | Performed by: OBSTETRICS & GYNECOLOGY

## 2022-06-22 PROCEDURE — 99999 PR PBB SHADOW E&M-EST. PATIENT-LVL II: CPT | Mod: PBBFAC,,, | Performed by: OBSTETRICS & GYNECOLOGY

## 2022-06-22 PROCEDURE — 99212 OFFICE O/P EST SF 10 MIN: CPT | Mod: PBBFAC,TH,PN | Performed by: OBSTETRICS & GYNECOLOGY

## 2022-06-22 NOTE — PROGRESS NOTES
Pregnancy dating, labs, ultrasound reports, prenatal testing, and problem list; prior records and results; and available outside records were reviewed and updated in EMR.  Pertinent findings were noted below.    Reason for Visit   Routine Prenatal Visit    HPI   36 y.o., at 17w2d by Estimated Date of Delivery: 11/28/22    C/o blurry vision, lightheaded at times    Contractions: No   Bleeding: No   Loss of fluid: No   Fetal movement: No   Nausea: No   Vomiting: No   Headache: No     Exam   /84   Wt 66 kg (145 lb 8.1 oz)   LMP 02/23/2022   BMI 24.96 kg/m²     GENERAL: No acute distress  ABD: Gravid < umbilicus    Assessment and Plan   Antepartum multigravida of advanced maternal age  -     Maternal Screen AFP (Single Marker); Future; Expected date: 06/22/2022    Other orders  -     Connected MOM Enrollment  -     Assign Connected MOM Program Consent Questionnaire         Discussed swelling of corneal lens in pregnancy. Will resolve after pregnancy.    Discussing going from supine > sitting > standing slowly as BP is lowest during this stage of pregnancy. Counseled on adequate hydration.   AFP ordered, to be done prior to anatomy US on 7/6   Conn mom d/w patient    SAB precautions given  Follow-up: 4 weeks

## 2022-06-29 ENCOUNTER — PROCEDURE VISIT (OUTPATIENT)
Dept: OBSTETRICS AND GYNECOLOGY | Facility: CLINIC | Age: 37
End: 2022-06-29
Payer: COMMERCIAL

## 2022-06-29 DIAGNOSIS — O09.529 ANTEPARTUM MULTIGRAVIDA OF ADVANCED MATERNAL AGE: ICD-10-CM

## 2022-06-29 PROCEDURE — 82105 ALPHA-FETOPROTEIN SERUM: CPT | Performed by: OBSTETRICS & GYNECOLOGY

## 2022-06-29 NOTE — PROGRESS NOTES
Lab Documentation:    Order Type: Written Order placed in [facility]    Patient in for lab visit only per provider treatment plan.

## 2022-07-05 ENCOUNTER — PATIENT MESSAGE (OUTPATIENT)
Dept: MATERNAL FETAL MEDICINE | Facility: CLINIC | Age: 37
End: 2022-07-05
Payer: COMMERCIAL

## 2022-07-05 LAB
# FETUSES US: NORMAL
AFP INTERPRETATION: NORMAL
AFP MOM SERPL: 1.06
AFP SERPL-MCNC: 57.8 NG/ML
AFP SERPL-MCNC: NEGATIVE NG/ML
AGE AT DELIVERY: 38
GA (DAYS): 2 D
GA (WEEKS): 18 WK
GESTATIONAL AGE METHOD: NORMAL
IDDM PATIENT QL: NORMAL
SMOKING STATUS FTND: NO

## 2022-07-06 ENCOUNTER — PROCEDURE VISIT (OUTPATIENT)
Dept: MATERNAL FETAL MEDICINE | Facility: CLINIC | Age: 37
End: 2022-07-06
Payer: COMMERCIAL

## 2022-07-06 DIAGNOSIS — O09.299 HX OF PREECLAMPSIA, PRIOR PREGNANCY, CURRENTLY PREGNANT: ICD-10-CM

## 2022-07-06 DIAGNOSIS — Z36.89 ENCOUNTER FOR ULTRASOUND TO ASSESS FETAL GROWTH: Primary | ICD-10-CM

## 2022-07-06 PROCEDURE — 76811 US MFM PROCEDURE (VIEWPOINT): ICD-10-PCS | Mod: S$GLB,,, | Performed by: OBSTETRICS & GYNECOLOGY

## 2022-07-06 PROCEDURE — 76811 OB US DETAILED SNGL FETUS: CPT | Mod: PBBFAC | Performed by: OBSTETRICS & GYNECOLOGY

## 2022-07-15 ENCOUNTER — OFFICE VISIT (OUTPATIENT)
Dept: OBSTETRICS AND GYNECOLOGY | Facility: CLINIC | Age: 37
End: 2022-07-15
Payer: COMMERCIAL

## 2022-07-15 VITALS
WEIGHT: 145.5 LBS | BODY MASS INDEX: 24.84 KG/M2 | SYSTOLIC BLOOD PRESSURE: 114 MMHG | DIASTOLIC BLOOD PRESSURE: 74 MMHG | HEIGHT: 64 IN

## 2022-07-15 DIAGNOSIS — O09.529 ANTEPARTUM MULTIGRAVIDA OF ADVANCED MATERNAL AGE: Primary | ICD-10-CM

## 2022-07-15 PROCEDURE — 0502F PR SUBSEQUENT PRENATAL CARE: ICD-10-PCS | Mod: S$GLB,,, | Performed by: OBSTETRICS & GYNECOLOGY

## 2022-07-15 PROCEDURE — 0502F SUBSEQUENT PRENATAL CARE: CPT | Mod: S$GLB,,, | Performed by: OBSTETRICS & GYNECOLOGY

## 2022-07-15 PROCEDURE — 99999 PR PBB SHADOW E&M-EST. PATIENT-LVL III: ICD-10-PCS | Mod: PBBFAC,,, | Performed by: OBSTETRICS & GYNECOLOGY

## 2022-07-15 PROCEDURE — 99999 PR PBB SHADOW E&M-EST. PATIENT-LVL III: CPT | Mod: PBBFAC,,, | Performed by: OBSTETRICS & GYNECOLOGY

## 2022-07-15 PROCEDURE — 99213 OFFICE O/P EST LOW 20 MIN: CPT | Mod: PBBFAC,TH,PN | Performed by: OBSTETRICS & GYNECOLOGY

## 2022-07-15 NOTE — PROGRESS NOTES
"Pregnancy dating, labs, ultrasound reports, prenatal testing, and problem list; prior records and results; and available outside records were reviewed and updated in EMR.  Pertinent findings were noted below.    Reason for Visit   Routine Prenatal Visit    HPI   37 y.o., at 20w4d by Estimated Date of Delivery: 11/28/22    No complaints. Hasn't started baby ASA yet.    Contractions: No   Bleeding: No   Loss of fluid: No   Fetal movement: Yes   Nausea: No   Vomiting: No   Headache: No     Exam   /74   Ht 5' 4" (1.626 m)   Wt 66 kg (145 lb 8.1 oz)   LMP 02/23/2022   BMI 24.98 kg/m²     GENERAL: No acute distress  ABD: Gravid  FH: 22cm  FHT: 135bpm    Assessment and Plan   Antepartum multigravida of advanced maternal age  -     OB Glucose Screen; Future; Expected date: 07/15/2022  -     CBC Auto Differential; Future; Expected date: 07/15/2022         Counseled on ASA for PreE prevention. Does not desire Rx. Will  at [facility]   Discussed at length re: low lying placenta diagnosis and plan for follow up. 32wk growth and placental location scheduled 10/5 - patient aware of appt   2T labs recommended in 4-8 weeks    Pain and bleeding precautions given  Follow-up: 4 weeks    Total time of 40 minutes, including face-to-face time and non-face-to-face time preparing to see the patient (eg, review of tests), obtaining and/or reviewing separately obtained history, documenting clinical information in the electronic or other health record, independently interpreting results, communicating results to the patient/family/caregiver, or care coordination    "

## 2022-08-10 ENCOUNTER — ROUTINE PRENATAL (OUTPATIENT)
Dept: OBSTETRICS AND GYNECOLOGY | Facility: CLINIC | Age: 37
End: 2022-08-10
Payer: MEDICAID

## 2022-08-10 VITALS
DIASTOLIC BLOOD PRESSURE: 72 MMHG | WEIGHT: 155.63 LBS | BODY MASS INDEX: 26.72 KG/M2 | SYSTOLIC BLOOD PRESSURE: 110 MMHG

## 2022-08-10 DIAGNOSIS — Z34.83 ENCOUNTER FOR SUPERVISION OF OTHER NORMAL PREGNANCY IN THIRD TRIMESTER: Primary | ICD-10-CM

## 2022-08-10 PROCEDURE — 99213 OFFICE O/P EST LOW 20 MIN: CPT | Mod: PBBFAC,TH,PN | Performed by: ADVANCED PRACTICE MIDWIFE

## 2022-08-10 PROCEDURE — 99999 PR PBB SHADOW E&M-EST. PATIENT-LVL III: CPT | Mod: PBBFAC,,, | Performed by: ADVANCED PRACTICE MIDWIFE

## 2022-08-10 PROCEDURE — 99213 OFFICE O/P EST LOW 20 MIN: CPT | Mod: TH,S$PBB,, | Performed by: ADVANCED PRACTICE MIDWIFE

## 2022-08-10 PROCEDURE — 99999 PR PBB SHADOW E&M-EST. PATIENT-LVL III: ICD-10-PCS | Mod: PBBFAC,,, | Performed by: ADVANCED PRACTICE MIDWIFE

## 2022-08-10 PROCEDURE — 99213 PR OFFICE/OUTPT VISIT, EST, LEVL III, 20-29 MIN: ICD-10-PCS | Mod: TH,S$PBB,, | Performed by: ADVANCED PRACTICE MIDWIFE

## 2022-08-10 NOTE — PROGRESS NOTES
Reason for visit: Routine Prenatal Visit      HPI:   37 y.o., at 24w2d by Estimated Date of Delivery: 22    In with no c/o    - Contractions: denies  - Bleeding: denies  - Loss of fluid: denies  - Fetal movement: reports good Fm, reinforced BId  - Nausea: no  - Vomiting: no  - Headache: no      Reviewed:    Past medical, surgical, social, family, and obstetric history: Reviewed and updated in EMR.  Medications: Reviewed and updated in EMR.  Allergies: Patient has no known allergies.    Pregnancy dating, labs, ultrasound reports, prenatal testing, and problem list: Reviewed and updated in EMR.  Outside records: na  Independent interpretation of tests: na  Discussion with another healthcare professional: na      Vitals: /72   Wt 70.6 kg (155 lb 10.3 oz)   LMP 2022   BMI 26.72 kg/m²     Physical exam:  GENERAL: No acute distress  ABD: Gravid      Assessment and Plan:    Encounter for supervision of other normal pregnancy in third trimester  -     CBC Auto Differential; Future; Expected date: 08/10/2022  -     OB Glucose Screen; Future; Expected date: 08/10/2022         DM screen scheduled 22   Discussed rec for TDAP at next visit     labor precautions given  Follow-up: 4 weeks      I spent a total of 20 minutes on the day of the visit. This includes face to face time and non-face to face time preparing to see the patient (eg, review of tests), Obtaining and/or reviewing separately obtained history, Documenting clinical information in the electronic or other health record, Independently interpreting results and communicating results to the patient/family/caregiver, or Care coordination.

## 2022-08-31 ENCOUNTER — PATIENT MESSAGE (OUTPATIENT)
Dept: OBSTETRICS AND GYNECOLOGY | Facility: CLINIC | Age: 37
End: 2022-08-31
Payer: COMMERCIAL

## 2022-08-31 ENCOUNTER — PROCEDURE VISIT (OUTPATIENT)
Dept: OBSTETRICS AND GYNECOLOGY | Facility: CLINIC | Age: 37
End: 2022-08-31
Payer: COMMERCIAL

## 2022-08-31 DIAGNOSIS — O09.529 ANTEPARTUM MULTIGRAVIDA OF ADVANCED MATERNAL AGE: ICD-10-CM

## 2022-08-31 LAB
BASOPHILS # BLD AUTO: 0.04 K/UL (ref 0–0.2)
BASOPHILS NFR BLD: 0.3 % (ref 0–1.9)
DIFFERENTIAL METHOD: ABNORMAL
EOSINOPHIL # BLD AUTO: 0.1 K/UL (ref 0–0.5)
EOSINOPHIL NFR BLD: 0.8 % (ref 0–8)
ERYTHROCYTE [DISTWIDTH] IN BLOOD BY AUTOMATED COUNT: 13.8 % (ref 11.5–14.5)
GLUCOSE SERPL-MCNC: 117 MG/DL (ref 70–140)
HCT VFR BLD AUTO: 34.8 % (ref 37–48.5)
HGB BLD-MCNC: 11.5 G/DL (ref 12–16)
IMM GRANULOCYTES # BLD AUTO: 0.15 K/UL (ref 0–0.04)
IMM GRANULOCYTES NFR BLD AUTO: 1.1 % (ref 0–0.5)
LYMPHOCYTES # BLD AUTO: 1.5 K/UL (ref 1–4.8)
LYMPHOCYTES NFR BLD: 10.2 % (ref 18–48)
MCH RBC QN AUTO: 28 PG (ref 27–31)
MCHC RBC AUTO-ENTMCNC: 33 G/DL (ref 32–36)
MCV RBC AUTO: 85 FL (ref 82–98)
MONOCYTES # BLD AUTO: 1.1 K/UL (ref 0.3–1)
MONOCYTES NFR BLD: 7.7 % (ref 4–15)
NEUTROPHILS # BLD AUTO: 11.4 K/UL (ref 1.8–7.7)
NEUTROPHILS NFR BLD: 79.9 % (ref 38–73)
NRBC BLD-RTO: 0 /100 WBC
PLATELET # BLD AUTO: 250 K/UL (ref 150–450)
PMV BLD AUTO: 10 FL (ref 9.2–12.9)
RBC # BLD AUTO: 4.11 M/UL (ref 4–5.4)
WBC # BLD AUTO: 14.28 K/UL (ref 3.9–12.7)

## 2022-08-31 PROCEDURE — 85025 COMPLETE CBC W/AUTO DIFF WBC: CPT | Performed by: OBSTETRICS & GYNECOLOGY

## 2022-08-31 PROCEDURE — 82950 GLUCOSE TEST: CPT | Performed by: OBSTETRICS & GYNECOLOGY

## 2022-08-31 NOTE — PROGRESS NOTES
Lab Documentation:    Order Type: Written Order placed in [facility]    Patient in for lab visit only per provider treatment plan.

## 2022-09-01 ENCOUNTER — TELEPHONE (OUTPATIENT)
Dept: OBSTETRICS AND GYNECOLOGY | Facility: CLINIC | Age: 37
End: 2022-09-01
Payer: COMMERCIAL

## 2022-09-01 ENCOUNTER — HOSPITAL ENCOUNTER (EMERGENCY)
Facility: OTHER | Age: 37
Discharge: HOME | End: 2022-09-01
Attending: PEDIATRICS
Payer: MEDICAID

## 2022-09-01 ENCOUNTER — PATIENT MESSAGE (OUTPATIENT)
Dept: OBSTETRICS AND GYNECOLOGY | Facility: CLINIC | Age: 37
End: 2022-09-01
Payer: COMMERCIAL

## 2022-09-01 VITALS
RESPIRATION RATE: 18 BRPM | OXYGEN SATURATION: 99 % | TEMPERATURE: 98 F | SYSTOLIC BLOOD PRESSURE: 112 MMHG | DIASTOLIC BLOOD PRESSURE: 73 MMHG | HEART RATE: 91 BPM

## 2022-09-01 DIAGNOSIS — R00.2 PALPITATIONS: ICD-10-CM

## 2022-09-01 DIAGNOSIS — Z3A.27 27 WEEKS GESTATION OF PREGNANCY: ICD-10-CM

## 2022-09-01 DIAGNOSIS — R42 DIZZINESS: Primary | ICD-10-CM

## 2022-09-01 LAB
BILIRUB SERPL-MCNC: NORMAL MG/DL
BLOOD URINE, POC: NORMAL
COLOR, POC UA: NORMAL
GLUCOSE UR QL STRIP: NORMAL
KETONES UR QL STRIP: NORMAL
LEUKOCYTE ESTERASE URINE, POC: NORMAL
NITRITE, POC UA: NORMAL
PH, POC UA: 7
POCT GLUCOSE: 65 MG/DL (ref 70–110)
PROTEIN, POC: NORMAL
SPECIFIC GRAVITY, POC UA: 10.15
UROBILINOGEN, POC UA: NORMAL

## 2022-09-01 PROCEDURE — 93010 ELECTROCARDIOGRAM REPORT: CPT | Mod: 59,,, | Performed by: STUDENT IN AN ORGANIZED HEALTH CARE EDUCATION/TRAINING PROGRAM

## 2022-09-01 PROCEDURE — 93010 PR ELECTROCARDIOGRAM REPORT: ICD-10-PCS | Mod: 59,,, | Performed by: STUDENT IN AN ORGANIZED HEALTH CARE EDUCATION/TRAINING PROGRAM

## 2022-09-01 PROCEDURE — 99284 PR EMERGENCY DEPT VISIT,LEVEL IV: ICD-10-PCS | Mod: 25,,, | Performed by: STUDENT IN AN ORGANIZED HEALTH CARE EDUCATION/TRAINING PROGRAM

## 2022-09-01 PROCEDURE — 59025 FETAL NON-STRESS TEST: CPT

## 2022-09-01 PROCEDURE — 81002 URINALYSIS NONAUTO W/O SCOPE: CPT

## 2022-09-01 PROCEDURE — 82962 GLUCOSE BLOOD TEST: CPT

## 2022-09-01 PROCEDURE — 99284 EMERGENCY DEPT VISIT MOD MDM: CPT | Mod: 25

## 2022-09-01 PROCEDURE — 59025 PR FETAL 2N-STRESS TEST: ICD-10-PCS | Mod: 26,,, | Performed by: STUDENT IN AN ORGANIZED HEALTH CARE EDUCATION/TRAINING PROGRAM

## 2022-09-01 PROCEDURE — 59025 FETAL NON-STRESS TEST: CPT | Mod: 26,,, | Performed by: STUDENT IN AN ORGANIZED HEALTH CARE EDUCATION/TRAINING PROGRAM

## 2022-09-01 PROCEDURE — 99284 EMERGENCY DEPT VISIT MOD MDM: CPT | Mod: 25,,, | Performed by: STUDENT IN AN ORGANIZED HEALTH CARE EDUCATION/TRAINING PROGRAM

## 2022-09-01 NOTE — TELEPHONE ENCOUNTER
Spoke with patient and she stated blood pressure 148/90, feeling dizzy , light headed, not feeling well. Advised to go to [facility] to be evaluated per Dr Baptiste. Patient stated understanding.

## 2022-09-01 NOTE — ED PROVIDER NOTES
Encounter Date: 2022       History     Chief Complaint   Patient presents with    Hypertension    Dizziness     Sarah Lee is a 37 y.o. [id number] at 27w3d presents complaining of elevated blood pressures and dizziness. Patient reports while at work she began to feel dizzy and short of breath with ambulation. She had a co-worker take her blood pressure and it was noted to be 140s/90s. She reports blurry vision and felt as though her heart was racing while ambulating earlier. She denies any of these symptoms at rest. She denies any headaches, scotoma, chest pain or RUQ pain. Patient denies contractions, denies vaginal bleeding, denies LOF.   Fetal Movement: normal    This IUP is complicated by AMA, and low lying placenta.      Review of patient's allergies indicates:  No Known Allergies  Past Medical History:   Diagnosis Date    Depression     GERD (gastroesophageal reflux disease)     Gestational hypertension     History of bleeding ulcers     upper stomach    Ovarian cyst, left     Stomach ulcer     Tooth abscess     left lower tooth abcess    Ulcer      No past surgical history on file.  Family History   Problem Relation Age of Onset    Diabetes Paternal Grandmother     Hypertension Paternal Grandmother     Diabetes Maternal Grandmother     Hypertension Maternal Grandmother     Diabetes Mother     Hypertension Mother     Pancreatic cancer Mother 55    Coronary artery disease Father 50         of an MI age 61    Breast cancer Neg Hx     Colon cancer Neg Hx     Ovarian cancer Neg Hx     Esophageal cancer Neg Hx      Social History     Tobacco Use    Smoking status: Never    Smokeless tobacco: Never   Substance Use Topics    Alcohol use: No    Drug use: No     Review of Systems   Constitutional:  Negative for chills and fever.   HENT:  Negative for congestion and sore throat.    Eyes:  Negative for visual disturbance.   Respiratory:  Positive for shortness of breath.    Cardiovascular:  Positive for  palpitations. Negative for chest pain.   Gastrointestinal:  Negative for abdominal pain, constipation, diarrhea, nausea and vomiting.   Genitourinary:  Negative for dysuria and vaginal bleeding.   Musculoskeletal:  Negative for back pain.   Skin:  Negative for rash.   Neurological:  Positive for dizziness. Negative for headaches.   Psychiatric/Behavioral:  Negative for dysphoric mood.      Physical Exam     Initial Vitals   BP Pulse Resp Temp SpO2   09/01/22 1109 09/01/22 1107 09/01/22 1109 09/01/22 1125 09/01/22 1107   118/81 96 20 98.1 °F (36.7 °C) 100 %      MAP       --                Physical Exam    Vitals reviewed.  Constitutional: She appears well-developed and well-nourished. She is not diaphoretic. No distress.   HENT:   Head: Normocephalic and atraumatic.   Cardiovascular:  Normal rate.           Pulmonary/Chest: Breath sounds normal. No respiratory distress.   Abdominal: Abdomen is soft. There is no abdominal tenderness.   Gravid There is no rebound and no guarding.   Musculoskeletal:         General: No tenderness or edema. Normal range of motion.     Neurological: She is alert and oriented to person, place, and time.   Skin: Skin is warm and dry.   Psychiatric: She has a normal mood and affect. Her behavior is normal. Thought content normal.       ED Course   Obtain Fetal nonstress test (NST)    Date/Time: 9/1/2022 11:24 AM  Performed by: Sarah Hester MD  Authorized by: Sarah Hester MD     Nonstress Test:     Variability:  6-25 BPM    Decelerations:  None    Accelerations:  15 bpm    Baseline:  130    Contractions:  Not present  Biophysical Profile:     Nonstress Test Interpretation: reactive      Overall Impression:  Reassuring  Post-procedure:     Patient tolerance:  Patient tolerated the procedure well with no immediate complications  Labs Reviewed   POCT GLUCOSE - Abnormal; Notable for the following components:       Result Value    POCT Glucose 65 (*)     All other components within normal  limits   POCT URINALYSIS, DIPSTICK OR TABLET REAGENT, AUTOMATED, WITH MICROSCOP     EKG Readings: (Independently Interpreted)   Initial Reading: No STEMI. Rhythm: Normal Sinus Rhythm.   Normal EKG     Imaging Results    None          Medications - No data to display  Medical Decision Making:   ED Management:  37 y.o. [id number] at 27w3d presents complaining of elevated blood pressures and dizziness  VSS   NST: reactive and reassuring, no contractions on toco  Udip: WNL  POCT glucose: 65  EKG: NSR  Patient deemed stable for discharge. Return precautions given    Other:   I have discussed this case with another health care provider.       <> Summary of the Discussion: Case discussed with [facility] staff who are in agreement with assessment and plan. Dizziness workup WNL. No elevated blood pressures while in [facility].           Attending Attestation:   Physician Attestation Statement for Resident:  As the supervising MD   Physician Attestation Statement: I have personally seen and examined this patient.   I agree with the above history.  -:   As the supervising MD I agree with the above PE.     As the supervising MD I agree with the above treatment, course, plan, and disposition.   -: Patient evaluated and found to be stable, agree with resident's assessment and plan.  Vital signs stable.NST reactive.  Episode of dizziness at work, spontaneously resolved. EKG WNL. Mild hypoglycemia.   Stable for discharge home.    I was personally present during the critical portions of the procedure(s) performed by the resident and was immediately available in the ED to provide services and assistance as needed during the entire procedure.  I have reviewed and agree with the residents interpretation of the following: EKG.  I have reviewed the following: old records at this facility.                       Clinical Impression:   Final diagnoses:  [R00.2] Palpitations  [Z3A.27] 27 weeks gestation of pregnancy  [R42] Dizziness (Primary)      ED  Disposition Condition    Discharge Stable          ED Prescriptions    None       Follow-up Information    None          Sarah Hester MD  Resident  09/01/22 1416       Klarissa Schwarz MD  09/01/22 5970

## 2022-09-05 ENCOUNTER — PATIENT MESSAGE (OUTPATIENT)
Dept: ADMINISTRATIVE | Facility: OTHER | Age: 37
End: 2022-09-05
Payer: COMMERCIAL

## 2022-09-07 ENCOUNTER — ROUTINE PRENATAL (OUTPATIENT)
Dept: OBSTETRICS AND GYNECOLOGY | Facility: CLINIC | Age: 37
End: 2022-09-07
Payer: MEDICAID

## 2022-09-07 VITALS
DIASTOLIC BLOOD PRESSURE: 70 MMHG | WEIGHT: 158.94 LBS | BODY MASS INDEX: 27.28 KG/M2 | SYSTOLIC BLOOD PRESSURE: 116 MMHG

## 2022-09-07 DIAGNOSIS — Z34.83 ENCOUNTER FOR SUPERVISION OF OTHER NORMAL PREGNANCY IN THIRD TRIMESTER: Primary | ICD-10-CM

## 2022-09-07 PROCEDURE — 99213 PR OFFICE/OUTPT VISIT, EST, LEVL III, 20-29 MIN: ICD-10-PCS | Mod: TH,S$PBB,, | Performed by: ADVANCED PRACTICE MIDWIFE

## 2022-09-07 PROCEDURE — 99999 PR PBB SHADOW E&M-EST. PATIENT-LVL II: CPT | Mod: PBBFAC,,, | Performed by: ADVANCED PRACTICE MIDWIFE

## 2022-09-07 PROCEDURE — 99213 OFFICE O/P EST LOW 20 MIN: CPT | Mod: TH,S$PBB,, | Performed by: ADVANCED PRACTICE MIDWIFE

## 2022-09-07 PROCEDURE — 99999 PR PBB SHADOW E&M-EST. PATIENT-LVL II: ICD-10-PCS | Mod: PBBFAC,,, | Performed by: ADVANCED PRACTICE MIDWIFE

## 2022-09-07 PROCEDURE — 99212 OFFICE O/P EST SF 10 MIN: CPT | Mod: PBBFAC,TH,PN | Performed by: ADVANCED PRACTICE MIDWIFE

## 2022-09-07 NOTE — PROGRESS NOTES
Reason for visit: Routine Prenatal Visit      HPI:   37 y.o., at 28w2d by Estimated Date of Delivery: 22    In with report of episodic dizziness/ weakness- see ER visit    - Contractions: denies  - Bleeding: denies  - Loss of fluid: denies  - Fetal movement: reports good FM, reinforced BId  - Nausea: no  - Vomiting: no  - Headache: no      Reviewed:    Past medical, surgical, social, family, and obstetric history: Reviewed and updated in EMR.  Medications: Reviewed and updated in EMR.  Allergies: Patient has no known allergies.    Pregnancy dating, labs, ultrasound reports, prenatal testing, and problem list: Reviewed and updated in EMR.  Outside records: na  Independent interpretation of tests: nan  Discussion with another healthcare professional: na      Vitals: /70   Wt 72.1 kg (158 lb 15.2 oz)   LMP 2022   BMI 27.28 kg/m²     Physical exam:  GENERAL: No acute distress  ABD: Gravid      Assessment and Plan:    Encounter for supervision of other normal pregnancy in third trimester         Reviewed mild hypoglycemia and symptom with low blood glucose- discussed dietary measures to alleviate   Discussed proper hydration in pregnancy     labor precautions given  Follow-up:  weeks      I spent a total of 20 minutes on the day of the visit. This includes face to face time and non-face to face time preparing to see the patient (eg, review of tests), Obtaining and/or reviewing separately obtained history, Documenting clinical information in the electronic or other health record, Independently interpreting results and communicating results to the patient/family/caregiver, or Care coordination.

## 2022-09-22 ENCOUNTER — ROUTINE PRENATAL (OUTPATIENT)
Dept: OBSTETRICS AND GYNECOLOGY | Facility: CLINIC | Age: 37
End: 2022-09-22
Payer: MEDICAID

## 2022-09-22 VITALS
BODY MASS INDEX: 27.47 KG/M2 | DIASTOLIC BLOOD PRESSURE: 64 MMHG | SYSTOLIC BLOOD PRESSURE: 102 MMHG | WEIGHT: 160.06 LBS

## 2022-09-22 DIAGNOSIS — Z34.83 ENCOUNTER FOR SUPERVISION OF OTHER NORMAL PREGNANCY IN THIRD TRIMESTER: Primary | ICD-10-CM

## 2022-09-22 DIAGNOSIS — F41.0 PANIC DISORDER: ICD-10-CM

## 2022-09-22 DIAGNOSIS — O44.40 LOW LYING PLACENTA, ANTEPARTUM: ICD-10-CM

## 2022-09-22 DIAGNOSIS — F41.9 ANXIETY DURING PREGNANCY: ICD-10-CM

## 2022-09-22 DIAGNOSIS — O99.340 ANXIETY DURING PREGNANCY: ICD-10-CM

## 2022-09-22 PROCEDURE — 99999 PR PBB SHADOW E&M-EST. PATIENT-LVL III: ICD-10-PCS | Mod: PBBFAC,,, | Performed by: OBSTETRICS & GYNECOLOGY

## 2022-09-22 PROCEDURE — 99999 PR PBB SHADOW E&M-EST. PATIENT-LVL III: CPT | Mod: PBBFAC,,, | Performed by: OBSTETRICS & GYNECOLOGY

## 2022-09-22 PROCEDURE — 99213 OFFICE O/P EST LOW 20 MIN: CPT | Mod: PBBFAC,TH,PN | Performed by: OBSTETRICS & GYNECOLOGY

## 2022-09-22 PROCEDURE — 99213 PR OFFICE/OUTPT VISIT, EST, LEVL III, 20-29 MIN: ICD-10-PCS | Mod: TH,S$PBB,, | Performed by: OBSTETRICS & GYNECOLOGY

## 2022-09-22 PROCEDURE — 99213 OFFICE O/P EST LOW 20 MIN: CPT | Mod: TH,S$PBB,, | Performed by: OBSTETRICS & GYNECOLOGY

## 2022-09-22 RX ORDER — ESCITALOPRAM OXALATE 20 MG/1
20 TABLET ORAL DAILY
Qty: 90 TABLET | Refills: 3 | Status: SHIPPED | OUTPATIENT
Start: 2022-09-22 | End: 2023-01-27 | Stop reason: SDUPTHER
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
## 2022-10-03 ENCOUNTER — PATIENT MESSAGE (OUTPATIENT)
Dept: ADMINISTRATIVE | Facility: OTHER | Age: 37
End: 2022-10-03
Payer: COMMERCIAL

## 2022-10-04 ENCOUNTER — PATIENT MESSAGE (OUTPATIENT)
Dept: MATERNAL FETAL MEDICINE | Facility: CLINIC | Age: 37
End: 2022-10-04
Payer: COMMERCIAL

## 2022-10-05 ENCOUNTER — ROUTINE PRENATAL (OUTPATIENT)
Dept: OBSTETRICS AND GYNECOLOGY | Facility: CLINIC | Age: 37
End: 2022-10-05
Payer: COMMERCIAL

## 2022-10-05 ENCOUNTER — PROCEDURE VISIT (OUTPATIENT)
Dept: MATERNAL FETAL MEDICINE | Facility: CLINIC | Age: 37
End: 2022-10-05
Payer: COMMERCIAL

## 2022-10-05 VITALS — SYSTOLIC BLOOD PRESSURE: 115 MMHG | WEIGHT: 162.5 LBS | DIASTOLIC BLOOD PRESSURE: 79 MMHG | BODY MASS INDEX: 27.89 KG/M2

## 2022-10-05 DIAGNOSIS — Z36.89 ENCOUNTER FOR ULTRASOUND TO ASSESS FETAL GROWTH: ICD-10-CM

## 2022-10-05 DIAGNOSIS — Z34.83 ENCOUNTER FOR SUPERVISION OF OTHER NORMAL PREGNANCY IN THIRD TRIMESTER: Primary | ICD-10-CM

## 2022-10-05 PROCEDURE — 99999 PR PBB SHADOW E&M-EST. PATIENT-LVL II: ICD-10-PCS | Mod: PBBFAC,,, | Performed by: ADVANCED PRACTICE MIDWIFE

## 2022-10-05 PROCEDURE — 76816 OB US FOLLOW-UP PER FETUS: CPT | Mod: PBBFAC | Performed by: OBSTETRICS & GYNECOLOGY

## 2022-10-05 PROCEDURE — 0502F SUBSEQUENT PRENATAL CARE: CPT | Mod: S$GLB,,, | Performed by: ADVANCED PRACTICE MIDWIFE

## 2022-10-05 PROCEDURE — 76817 TRANSVAGINAL US OBSTETRIC: CPT | Mod: S$GLB,,, | Performed by: OBSTETRICS & GYNECOLOGY

## 2022-10-05 PROCEDURE — 0502F PR SUBSEQUENT PRENATAL CARE: ICD-10-PCS | Mod: S$GLB,,, | Performed by: ADVANCED PRACTICE MIDWIFE

## 2022-10-05 PROCEDURE — 76817 US MFM PROCEDURE (VIEWPOINT): ICD-10-PCS | Mod: S$GLB,,, | Performed by: OBSTETRICS & GYNECOLOGY

## 2022-10-05 PROCEDURE — 99212 OFFICE O/P EST SF 10 MIN: CPT | Mod: PBBFAC,TH,PN | Performed by: ADVANCED PRACTICE MIDWIFE

## 2022-10-05 PROCEDURE — 99999 PR PBB SHADOW E&M-EST. PATIENT-LVL II: CPT | Mod: PBBFAC,,, | Performed by: ADVANCED PRACTICE MIDWIFE

## 2022-10-05 PROCEDURE — 76816 US MFM PROCEDURE (VIEWPOINT): ICD-10-PCS | Mod: S$GLB,,, | Performed by: OBSTETRICS & GYNECOLOGY

## 2022-10-05 NOTE — PROGRESS NOTES
Reason for visit: Routine Prenatal Visit      HPI:   37 y.o., at 32w2d by Estimated Date of Delivery: 22    In with no c/o    - Contractions: denies  - Bleeding: denies  - Loss of fluid: denies  - Fetal movement: reports good Fm, reinforced BId  - Nausea: no  - Vomiting: no  - Headache: occ- none last long      Reviewed:    Past medical, surgical, social, family, and obstetric history: Reviewed and updated in EMR.  Medications: Reviewed and updated in EMR.  Allergies: Patient has no known allergies.    Pregnancy dating, labs, ultrasound reports, prenatal testing, and problem list: Reviewed and updated in EMR.  Outside records: na  Independent interpretation of tests: na  Discussion with another healthcare professional: na      Vitals: /79   Wt 73.7 kg (162 lb 7.7 oz)   LMP 2022   BMI 27.89 kg/m²     Physical exam:  GENERAL: No acute distress  ABD: Gravid      Assessment and Plan:    Encounter for supervision of other normal pregnancy in third trimester         Has MFM growth scan today     labor precautions given  Follow-up: 2 weeks      I spent a total of 20 minutes on the day of the visit. This includes face to face time and non-face to face time preparing to see the patient (eg, review of tests), Obtaining and/or reviewing separately obtained history, Documenting clinical information in the electronic or other health record, Independently interpreting results and communicating results to the patient/family/caregiver, or Care coordination.

## 2022-10-17 ENCOUNTER — PATIENT MESSAGE (OUTPATIENT)
Dept: OBSTETRICS AND GYNECOLOGY | Facility: CLINIC | Age: 37
End: 2022-10-17
Payer: COMMERCIAL

## 2022-10-20 ENCOUNTER — ROUTINE PRENATAL (OUTPATIENT)
Dept: OBSTETRICS AND GYNECOLOGY | Facility: CLINIC | Age: 37
End: 2022-10-20
Payer: MEDICAID

## 2022-10-20 VITALS — BODY MASS INDEX: 28.84 KG/M2 | WEIGHT: 168 LBS | DIASTOLIC BLOOD PRESSURE: 72 MMHG | SYSTOLIC BLOOD PRESSURE: 112 MMHG

## 2022-10-20 DIAGNOSIS — O09.529 ANTEPARTUM MULTIGRAVIDA OF ADVANCED MATERNAL AGE: Primary | ICD-10-CM

## 2022-10-20 DIAGNOSIS — Z34.83 ENCOUNTER FOR SUPERVISION OF OTHER NORMAL PREGNANCY IN THIRD TRIMESTER: ICD-10-CM

## 2022-10-20 DIAGNOSIS — O09.299 HX OF PREECLAMPSIA, PRIOR PREGNANCY, CURRENTLY PREGNANT: ICD-10-CM

## 2022-10-20 PROCEDURE — 99999 PR PBB SHADOW E&M-EST. PATIENT-LVL III: CPT | Mod: PBBFAC,,, | Performed by: OBSTETRICS & GYNECOLOGY

## 2022-10-20 PROCEDURE — 99213 OFFICE O/P EST LOW 20 MIN: CPT | Mod: PBBFAC,TH,PN | Performed by: OBSTETRICS & GYNECOLOGY

## 2022-10-20 PROCEDURE — 99213 PR OFFICE/OUTPT VISIT, EST, LEVL III, 20-29 MIN: ICD-10-PCS | Mod: TH,S$PBB,, | Performed by: OBSTETRICS & GYNECOLOGY

## 2022-10-20 PROCEDURE — 99999 PR PBB SHADOW E&M-EST. PATIENT-LVL III: ICD-10-PCS | Mod: PBBFAC,,, | Performed by: OBSTETRICS & GYNECOLOGY

## 2022-10-20 PROCEDURE — 99213 OFFICE O/P EST LOW 20 MIN: CPT | Mod: TH,S$PBB,, | Performed by: OBSTETRICS & GYNECOLOGY

## 2022-10-20 NOTE — PROGRESS NOTES
Pregnancy dating, labs, ultrasound reports, prenatal testing, and problem list; prior records and results; and available outside records were reviewed and updated in EMR.  Pertinent findings were noted below.    Reason for Visit   Routine Prenatal Visit    HPI   37 y.o., at 34w3d by Estimated Date of Delivery: 22      Contractions: No   Bleeding: No   Loss of fluid: No   Fetal movement: Yes   Nausea: No   Vomiting: No   Headache: No     Exam   /72   Wt 76.2 kg (167 lb 15.9 oz)   LMP 2022   BMI 28.84 kg/m²     GENERAL: No acute distress  ABD: Gravid    Assessment and Plan   Antepartum multigravida of advanced maternal age    Encounter for supervision of other normal pregnancy in third trimester    Hx of preeclampsia, prior pregnancy, currently pregnant         Patient has mild poly. Repeat ultrasound on .   Denies pre-E symptoms. BP well controlled. Continue daily ASA.      labor precautions given  Follow-up: 2 weeks     Beckie Ni MD  PGY-1 OBGYN

## 2022-11-01 ENCOUNTER — PATIENT MESSAGE (OUTPATIENT)
Dept: MATERNAL FETAL MEDICINE | Facility: CLINIC | Age: 37
End: 2022-11-01
Payer: COMMERCIAL

## 2022-11-02 ENCOUNTER — PROCEDURE VISIT (OUTPATIENT)
Dept: MATERNAL FETAL MEDICINE | Facility: CLINIC | Age: 37
End: 2022-11-02
Payer: COMMERCIAL

## 2022-11-02 ENCOUNTER — ROUTINE PRENATAL (OUTPATIENT)
Dept: OBSTETRICS AND GYNECOLOGY | Facility: CLINIC | Age: 37
End: 2022-11-02
Payer: COMMERCIAL

## 2022-11-02 ENCOUNTER — PROCEDURE VISIT (OUTPATIENT)
Dept: OBSTETRICS AND GYNECOLOGY | Facility: CLINIC | Age: 37
End: 2022-11-02
Payer: COMMERCIAL

## 2022-11-02 ENCOUNTER — PATIENT MESSAGE (OUTPATIENT)
Dept: OBSTETRICS AND GYNECOLOGY | Facility: CLINIC | Age: 37
End: 2022-11-02

## 2022-11-02 VITALS
BODY MASS INDEX: 28.76 KG/M2 | WEIGHT: 167.56 LBS | SYSTOLIC BLOOD PRESSURE: 128 MMHG | DIASTOLIC BLOOD PRESSURE: 86 MMHG

## 2022-11-02 DIAGNOSIS — O09.529 ANTEPARTUM MULTIGRAVIDA OF ADVANCED MATERNAL AGE: ICD-10-CM

## 2022-11-02 DIAGNOSIS — O99.340 ANXIETY DURING PREGNANCY: ICD-10-CM

## 2022-11-02 DIAGNOSIS — O09.299 HX OF PREECLAMPSIA, PRIOR PREGNANCY, CURRENTLY PREGNANT: ICD-10-CM

## 2022-11-02 DIAGNOSIS — F41.9 ANXIETY DURING PREGNANCY: ICD-10-CM

## 2022-11-02 DIAGNOSIS — O09.529 ANTEPARTUM MULTIGRAVIDA OF ADVANCED MATERNAL AGE: Primary | ICD-10-CM

## 2022-11-02 DIAGNOSIS — Z36.89 ENCOUNTER FOR ULTRASOUND TO ASSESS FETAL GROWTH: ICD-10-CM

## 2022-11-02 LAB
ALBUMIN SERPL BCP-MCNC: 2.8 G/DL (ref 3.5–5.2)
ALP SERPL-CCNC: 119 U/L (ref 55–135)
ALT SERPL W/O P-5'-P-CCNC: 18 U/L (ref 10–44)
ANION GAP SERPL CALC-SCNC: 10 MMOL/L (ref 8–16)
AST SERPL-CCNC: 20 U/L (ref 10–40)
BASOPHILS # BLD AUTO: 0.03 K/UL (ref 0–0.2)
BASOPHILS NFR BLD: 0.3 % (ref 0–1.9)
BILIRUB SERPL-MCNC: 0.9 MG/DL (ref 0.1–1)
BUN SERPL-MCNC: 7 MG/DL (ref 6–20)
CALCIUM SERPL-MCNC: 9.5 MG/DL (ref 8.7–10.5)
CHLORIDE SERPL-SCNC: 107 MMOL/L (ref 95–110)
CO2 SERPL-SCNC: 22 MMOL/L (ref 23–29)
CREAT SERPL-MCNC: 0.7 MG/DL (ref 0.5–1.4)
DIFFERENTIAL METHOD: ABNORMAL
EOSINOPHIL # BLD AUTO: 0.1 K/UL (ref 0–0.5)
EOSINOPHIL NFR BLD: 1.4 % (ref 0–8)
ERYTHROCYTE [DISTWIDTH] IN BLOOD BY AUTOMATED COUNT: 14 % (ref 11.5–14.5)
EST. GFR  (NO RACE VARIABLE): >60 ML/MIN/1.73 M^2
GLUCOSE SERPL-MCNC: 60 MG/DL (ref 70–110)
HCT VFR BLD AUTO: 39.9 % (ref 37–48.5)
HGB BLD-MCNC: 12.8 G/DL (ref 12–16)
HIV 1+2 AB+HIV1 P24 AG SERPL QL IA: NORMAL
IMM GRANULOCYTES # BLD AUTO: 0.09 K/UL (ref 0–0.04)
IMM GRANULOCYTES NFR BLD AUTO: 0.9 % (ref 0–0.5)
LYMPHOCYTES # BLD AUTO: 1.2 K/UL (ref 1–4.8)
LYMPHOCYTES NFR BLD: 11.7 % (ref 18–48)
MCH RBC QN AUTO: 27.4 PG (ref 27–31)
MCHC RBC AUTO-ENTMCNC: 32.1 G/DL (ref 32–36)
MCV RBC AUTO: 85 FL (ref 82–98)
MONOCYTES # BLD AUTO: 0.9 K/UL (ref 0.3–1)
MONOCYTES NFR BLD: 8.7 % (ref 4–15)
NEUTROPHILS # BLD AUTO: 7.6 K/UL (ref 1.8–7.7)
NEUTROPHILS NFR BLD: 77 % (ref 38–73)
NRBC BLD-RTO: 0 /100 WBC
PLATELET # BLD AUTO: 237 K/UL (ref 150–450)
PMV BLD AUTO: 10.4 FL (ref 9.2–12.9)
POTASSIUM SERPL-SCNC: 4.6 MMOL/L (ref 3.5–5.1)
PROT SERPL-MCNC: 6.5 G/DL (ref 6–8.4)
RBC # BLD AUTO: 4.68 M/UL (ref 4–5.4)
SODIUM SERPL-SCNC: 139 MMOL/L (ref 136–145)
WBC # BLD AUTO: 9.82 K/UL (ref 3.9–12.7)

## 2022-11-02 PROCEDURE — 85025 COMPLETE CBC W/AUTO DIFF WBC: CPT | Performed by: OBSTETRICS & GYNECOLOGY

## 2022-11-02 PROCEDURE — 0502F PR SUBSEQUENT PRENATAL CARE: ICD-10-PCS | Mod: S$GLB,,, | Performed by: OBSTETRICS & GYNECOLOGY

## 2022-11-02 PROCEDURE — 86592 SYPHILIS TEST NON-TREP QUAL: CPT | Performed by: OBSTETRICS & GYNECOLOGY

## 2022-11-02 PROCEDURE — 76816 US MFM PROCEDURE (VIEWPOINT): ICD-10-PCS | Mod: S$GLB,,, | Performed by: OBSTETRICS & GYNECOLOGY

## 2022-11-02 PROCEDURE — 99999 PR PBB SHADOW E&M-EST. PATIENT-LVL III: ICD-10-PCS | Mod: PBBFAC,,, | Performed by: OBSTETRICS & GYNECOLOGY

## 2022-11-02 PROCEDURE — 99213 OFFICE O/P EST LOW 20 MIN: CPT | Mod: PBBFAC,TH,PN | Performed by: OBSTETRICS & GYNECOLOGY

## 2022-11-02 PROCEDURE — 99999 PR PBB SHADOW E&M-EST. PATIENT-LVL III: CPT | Mod: PBBFAC,,, | Performed by: OBSTETRICS & GYNECOLOGY

## 2022-11-02 PROCEDURE — 0502F SUBSEQUENT PRENATAL CARE: CPT | Mod: S$GLB,,, | Performed by: OBSTETRICS & GYNECOLOGY

## 2022-11-02 PROCEDURE — 87389 HIV-1 AG W/HIV-1&-2 AB AG IA: CPT | Performed by: OBSTETRICS & GYNECOLOGY

## 2022-11-02 PROCEDURE — 80053 COMPREHEN METABOLIC PANEL: CPT | Performed by: OBSTETRICS & GYNECOLOGY

## 2022-11-02 PROCEDURE — 87081 CULTURE SCREEN ONLY: CPT | Performed by: OBSTETRICS & GYNECOLOGY

## 2022-11-02 PROCEDURE — 76816 OB US FOLLOW-UP PER FETUS: CPT | Mod: PBBFAC | Performed by: OBSTETRICS & GYNECOLOGY

## 2022-11-02 NOTE — LETTER
November 2, 2022      Braeden Luque, OBGYN  [address], [address]  [facility] [address]  Phone: [phone]  Fax: [phone]       Patient: Sarah Lee   YOB: 1985  Date of Visit: 11/02/2022    To Whom It May Concern:    Krista Lee  was at [facility] on 11/02/2022. The patient may NOT return to work at this time. If you have any questions or concerns, or if I can be of further assistance, please do not hesitate to contact me.    Sincerely,    Paulina Baptiste MD

## 2022-11-02 NOTE — PROGRESS NOTES
Lab Documentation:    Order Type: Written Order placed in [facility]    Patient in for lab visit only per provider treatment plan.

## 2022-11-02 NOTE — PROGRESS NOTES
Pregnancy dating, labs, ultrasound reports, prenatal testing, and problem list; prior records and results; and available outside records were reviewed and updated in EMR.  Pertinent findings were noted below.    Reason for Visit   Routine Prenatal Visit and At Home Elevated BP readings (167/92 3pm 22, 115/? 6pm 22 (evening), 138/? Today 7am. Reports headache and blurry vision associated with elevated BP at 3pm. Reports at home yesterday was in a restful state when the BP occurrence happened.)    HPI   37 y.o., at 36w2d by Estimated Date of Delivery: 22    Had US today, polyhydraminos resolved  Last night c/o HA/blurry vision with elevated BP on home cuff - rested and resolved    Contractions: No   Bleeding: No   Loss of fluid: No   Fetal movement: Yes   Nausea: No   Vomiting: No   Headache: No     Exam   /86   Wt 76 kg (167 lb 8.8 oz)   LMP 2022   BMI 28.76 kg/m²     TW#  GENERAL: No acute distress  ABD: Gravid  SVE: 2/50/-3, posterior, soft    Assessment and Plan   Antepartum multigravida of advanced maternal age  -     Strep B Screen, Vaginal / Rectal  -     HIV 1/2 Ag/Ab (4th Gen); Future; Expected date: 2022  -     RPR; Future; Expected date: 2022  -     CBC Auto Differential; Future; Expected date: 2022  -     IP OB Labor Induction; Future; Expected date: 2022    Hx of preeclampsia, prior pregnancy, currently pregnant  -     Comprehensive Metabolic Panel; Future; Expected date: 2022    Anxiety during pregnancy      Recommended for patient to go into [facility]IC without fail if she has return of HA/visual changes and/or elevated BP on home cuff. Normotensive in clinic today and asymptomatic. Will check CMP with 3T labs.  Consents signed today. History of 15 second shoulder dystocia - counseled and patient desires to labor and have vaginal birth. Counseled on risk of repeat shoulder and potential outcomes.  Circumcision consents signed  MOC/MOF:  OCPs/considering breast  Desires IOL @ 39wk if hasn't delivered by then - IOL requested for      labor, Pain and bleeding, preE, and fetal movement count precautions given  Follow-up: 1 week    Total time of 40 minutes, including face-to-face time and non-face-to-face time preparing to see the patient (eg, review of tests), obtaining and/or reviewing separately obtained history, documenting clinical information in the electronic or other health record, independently interpreting results, communicating results to the patient/family/caregiver, or care coordination

## 2022-11-02 NOTE — H&P
HISTORY AND PHYSICAL                                                OBSTETRICS          Subjective:       Sarah Lee is a 37 y.o.  female with IUP at 39w0d weeks gestation who presents for eIOL.    Patient denies contractions, denies vaginal bleeding, denies LOF.   Fetal Movement: normal.    This IUP is complicated by AMA (NIPT/AFP neg/neg), low lying placenta (resolved), mild polyhydraminos (resolved), history of PreE, history of shoulder dystocia (15 seconds, no  complications).    Review of Systems   Constitutional:  Negative for chills and fever.   Eyes:  Negative for visual disturbance.   Respiratory:  Negative for cough and shortness of breath.    Cardiovascular:  Negative for chest pain and leg swelling.   Gastrointestinal:  Negative for abdominal pain, nausea and vomiting.   Genitourinary:  Negative for dysuria, flank pain, frequency, urgency and vaginal bleeding.   Integumentary:  Negative for breast mass.   Neurological:  Negative for syncope and headaches.   Psychiatric/Behavioral:  Negative for depression. The patient is nervous/anxious.    All other systems reviewed and are negative.  Breast: Negative for mass and mastodynia    PMHx:   Past Medical History:   Diagnosis Date    Depression     GERD (gastroesophageal reflux disease)     Gestational hypertension     History of bleeding ulcers     upper stomach    Ovarian cyst, left     Stomach ulcer     Tooth abscess     left lower tooth abcess    Ulcer        PSHx: History reviewed. No pertinent surgical history.    All: Review of patient's allergies indicates:  No Known Allergies    Meds: (Not in a hospital admission)      SH:   Social History     Socioeconomic History    Marital status: Single   Tobacco Use    Smoking status: Never    Smokeless tobacco: Never   Substance and Sexual Activity    Alcohol use: No    Drug use: No    Sexual activity: Not Currently     Partners: Male     Birth control/protection: None     Comment:  single        FH:   Family History   Problem Relation Age of Onset    Diabetes Paternal Grandmother     Hypertension Paternal Grandmother     Diabetes Maternal Grandmother     Hypertension Maternal Grandmother     Diabetes Mother     Hypertension Mother     Pancreatic cancer Mother 55    Coronary artery disease Father 50         of an MI age 61    Breast cancer Neg Hx     Colon cancer Neg Hx     Ovarian cancer Neg Hx     Esophageal cancer Neg Hx        OBHx:   OB History    Para Term  AB Living   4 2 1 1 1 2   SAB IAB Ectopic Multiple Live Births   0 1 0 0 2      # Outcome Date GA Lbr Alin/2nd Weight Sex Delivery Anes PTL Lv   4 Current            3 Term 17 37w1d / 00:30 2.92 kg (6 lb 7 oz) M Vag-Forceps EPI N WILLIAM      Complications: Shoulder Dystocia      Name: DWIGHT CHAVEZ      Apgar1: 9  Apgar5: 9   2  12 36w6d 10:00 / 00:31 2.478 kg (5 lb 7.4 oz) M Vag-Spont EPI Y WILLIAM      Complications: Pre-eclampsia      Name: Legend      Apgar1: 8  Apgar5: 9   1 IAB  7w0d             Birth Comments: D&E       Objective:       /86   Wt 76 kg (167 lb 8.8 oz)   LMP 2022   BMI 28.76 kg/m²     Vitals:    22 0919   BP: 128/86   Weight: 76 kg (167 lb 8.8 oz)       General:   alert, appears stated age and cooperative, no apparent distress   HENT:  normocephalic, atraumatic   Eyes:  extraocular movements and conjunctivae normal   Neck:  supple, range of motion normal, no thyromegaly   Lungs:   no respiratory distress   Heart:   regular rate   Abdomen:  soft, non-tender, non-distended but gravid, no rebound or guarding   Extremities negative edema, negative erythema   FHT: To be performed upon admission                 TOCO: To be performed upon admission   Presentations: To be performed upon admission   Cervix: To be performed upon admission   Sterile Speculum Exam: N/A    Recent Growth Scan: 2022          36w 2d        3057g    53%     Lab Review  Blood Type  O POS  GBBS: pending at time of H&P  Rubella: Immune  RPR: NR  HIV: negative  HepB: negative       Assessment:       39w0d weeks gestation for eIOL    Plan:      Risks, benefits, alternatives and possible complications have been discussed in detail with the patient.   - Consents signed and to chart  - Admit to Labor and Delivery unit  - Epidural per Anesthesia  - Draw CBC, T&S  - IOL plan per L&D team  - EFW 7#  - Maternal pelvis proven to 6#7oz but had 15sec shoulder dystocia    History of shoulder dystocia  - Lasted 15 seconds, no  complications  - Patient was counseled re: risk of recurrence (10-15%) and potential maternal/ outcomes  - After discussing R/B/A, patient desires to proceed with labor/    AMA  - Aneuploidy screening NEG    Post-Partum Hemorrhage risk - low

## 2022-11-03 LAB — RPR SER QL: NORMAL

## 2022-11-03 NOTE — ADDENDUM NOTE
Addended by: CIPRIANO MURRELL on: 11/3/2022 08:34 AM     Modules accepted: Orders     lauren all pertinent systems normal

## 2022-11-05 LAB — BACTERIA SPEC AEROBE CULT: NORMAL

## 2022-11-09 ENCOUNTER — ROUTINE PRENATAL (OUTPATIENT)
Dept: OBSTETRICS AND GYNECOLOGY | Facility: CLINIC | Age: 37
End: 2022-11-09
Payer: COMMERCIAL

## 2022-11-09 VITALS — SYSTOLIC BLOOD PRESSURE: 133 MMHG | BODY MASS INDEX: 28.8 KG/M2 | DIASTOLIC BLOOD PRESSURE: 87 MMHG | WEIGHT: 167.75 LBS

## 2022-11-09 DIAGNOSIS — Z34.83 ENCOUNTER FOR SUPERVISION OF OTHER NORMAL PREGNANCY IN THIRD TRIMESTER: Primary | ICD-10-CM

## 2022-11-09 PROCEDURE — 99999 PR PBB SHADOW E&M-EST. PATIENT-LVL III: CPT | Mod: PBBFAC,,, | Performed by: ADVANCED PRACTICE MIDWIFE

## 2022-11-09 PROCEDURE — 99999 PR PBB SHADOW E&M-EST. PATIENT-LVL III: ICD-10-PCS | Mod: PBBFAC,,, | Performed by: ADVANCED PRACTICE MIDWIFE

## 2022-11-09 PROCEDURE — 99213 PR OFFICE/OUTPT VISIT, EST, LEVL III, 20-29 MIN: ICD-10-PCS | Mod: S$GLB,,, | Performed by: ADVANCED PRACTICE MIDWIFE

## 2022-11-09 PROCEDURE — 99213 OFFICE O/P EST LOW 20 MIN: CPT | Mod: PBBFAC,TH,PN | Performed by: ADVANCED PRACTICE MIDWIFE

## 2022-11-09 PROCEDURE — 99213 OFFICE O/P EST LOW 20 MIN: CPT | Mod: S$GLB,,, | Performed by: ADVANCED PRACTICE MIDWIFE

## 2022-11-09 NOTE — PROGRESS NOTES
Reason for visit: Routine Prenatal Visit      HPI:   37 y.o., at 37w2d by Estimated Date of Delivery: 11/28/22    In with  No c/o    - Contractions: denies  - Bleeding: denies  - Loss of fluid: denies  - Fetal movement: reports good Fm, reinforced BID  - Nausea: no  - Vomiting: no  - Headache: no      Reviewed:    Past medical, surgical, social, family, and obstetric history: Reviewed and updated in EMR.  Medications: Reviewed and updated in EMR.  Allergies: Patient has no known allergies.    Pregnancy dating, labs, ultrasound reports, prenatal testing, and problem list: Reviewed and updated in EMR.  Outside records: na  Independent interpretation of tests: na  Discussion with another healthcare professional: na      Vitals: /87   Wt 76.1 kg (167 lb 12.3 oz)   LMP 02/23/2022   BMI 28.80 kg/m²     Physical exam:  GENERAL: No acute distress  ABD: Gravid      Assessment and Plan:    Encounter for supervision of other normal pregnancy in third trimester             Reviewed s/s active labor, rom, bleeding and if occur report to L&D  Follow-up: 1 weeks      I spent a total of 20 minutes on the day of the visit. This includes face to face time and non-face to face time preparing to see the patient (eg, review of tests), Obtaining and/or reviewing separately obtained history, Documenting clinical information in the electronic or other health record, Independently interpreting results and communicating results to the patient/family/caregiver, or Care coordination.

## 2022-11-10 ENCOUNTER — TELEPHONE (OUTPATIENT)
Dept: OBSTETRICS AND GYNECOLOGY | Facility: CLINIC | Age: 37
End: 2022-11-10
Payer: COMMERCIAL

## 2022-11-10 NOTE — TELEPHONE ENCOUNTER
Returned phone call to disability to confirm patient may not return to work at this time dated 11/02/2022. Spoke to Jacki and answered all questions.

## 2022-11-15 ENCOUNTER — ROUTINE PRENATAL (OUTPATIENT)
Dept: OBSTETRICS AND GYNECOLOGY | Facility: CLINIC | Age: 37
End: 2022-11-15
Payer: COMMERCIAL

## 2022-11-15 VITALS — DIASTOLIC BLOOD PRESSURE: 84 MMHG | BODY MASS INDEX: 28.84 KG/M2 | SYSTOLIC BLOOD PRESSURE: 126 MMHG | WEIGHT: 168 LBS

## 2022-11-15 DIAGNOSIS — Z87.59 HISTORY OF SHOULDER DYSTOCIA IN PRIOR PREGNANCY: ICD-10-CM

## 2022-11-15 DIAGNOSIS — O09.299 HX OF PREECLAMPSIA, PRIOR PREGNANCY, CURRENTLY PREGNANT: Primary | ICD-10-CM

## 2022-11-15 PROCEDURE — 99212 OFFICE O/P EST SF 10 MIN: CPT | Mod: PBBFAC,TH,PN | Performed by: OBSTETRICS & GYNECOLOGY

## 2022-11-15 PROCEDURE — 0502F PR SUBSEQUENT PRENATAL CARE: ICD-10-PCS | Mod: S$GLB,,, | Performed by: OBSTETRICS & GYNECOLOGY

## 2022-11-15 PROCEDURE — 99999 PR PBB SHADOW E&M-EST. PATIENT-LVL II: CPT | Mod: PBBFAC,,, | Performed by: OBSTETRICS & GYNECOLOGY

## 2022-11-15 PROCEDURE — 0502F SUBSEQUENT PRENATAL CARE: CPT | Mod: S$GLB,,, | Performed by: OBSTETRICS & GYNECOLOGY

## 2022-11-15 PROCEDURE — 99999 PR PBB SHADOW E&M-EST. PATIENT-LVL II: ICD-10-PCS | Mod: PBBFAC,,, | Performed by: OBSTETRICS & GYNECOLOGY

## 2022-11-15 NOTE — PROGRESS NOTES
Pregnancy dating, labs, ultrasound reports, prenatal testing, and problem list; prior records and results; and available outside records were reviewed and updated in EMR.  Pertinent findings were noted below.    Reason for Visit   Routine Prenatal Visit    HPI   37 y.o., at 38w1d by Estimated Date of Delivery: 22    No complaints today    Contractions: No   Bleeding: No   Loss of fluid: No   Fetal movement: Yes   Nausea: No   Vomiting: No   Headache: No     Exam   /84   Wt 76.2 kg (167 lb 15.9 oz)   LMP 2022   BMI 28.84 kg/m²     TW lb  GENERAL: No acute distress  ABD: Gravid    Assessment and Plan   Hx of preeclampsia, prior pregnancy, currently pregnant    History of shoulder dystocia in prior pregnancy         MOD: anticipate , has previously been counseled re: recurrence of shoulder dystocia and desires to proceed with vaginal attempt   MOF: breast + bottle   MOC: OCPs   Consents: already signed   H&P: previously done    Labor/ERIC precautions given  Follow-up: post partum

## 2022-11-22 ENCOUNTER — PATIENT MESSAGE (OUTPATIENT)
Dept: OBSTETRICS AND GYNECOLOGY | Facility: OTHER | Age: 37
End: 2022-11-22
Payer: COMMERCIAL

## 2022-11-22 ENCOUNTER — HOSPITAL ENCOUNTER (INPATIENT)
Facility: OTHER | Age: 37
LOS: 3 days | Discharge: HOME | End: 2022-11-25
Attending: OBSTETRICS & GYNECOLOGY | Admitting: OBSTETRICS & GYNECOLOGY
Payer: COMMERCIAL

## 2022-11-22 DIAGNOSIS — Z34.90 ENCOUNTER FOR ELECTIVE INDUCTION OF LABOR: ICD-10-CM

## 2022-11-22 DIAGNOSIS — Z34.90 ENCOUNTER FOR INDUCTION OF LABOR: ICD-10-CM

## 2022-11-22 PROCEDURE — 11000001 HC ACUTE MED/SURG PRIVATE ROOM

## 2022-11-23 ENCOUNTER — ANESTHESIA (OUTPATIENT)
Dept: OBSTETRICS AND GYNECOLOGY | Facility: OTHER | Age: 37
End: 2022-11-23
Payer: COMMERCIAL

## 2022-11-23 ENCOUNTER — ANESTHESIA EVENT (OUTPATIENT)
Dept: OBSTETRICS AND GYNECOLOGY | Facility: OTHER | Age: 37
End: 2022-11-23
Payer: COMMERCIAL

## 2022-11-23 PROBLEM — Z34.90 ENCOUNTER FOR INDUCTION OF LABOR: Status: ACTIVE | Noted: 2022-11-23

## 2022-11-23 PROBLEM — Z34.90 ENCOUNTER FOR INDUCTION OF LABOR: Status: RESOLVED | Noted: 2022-11-23 | Resolved: 2022-11-23

## 2022-11-23 LAB
ABO + RH BLD: NORMAL
ALBUMIN SERPL BCP-MCNC: 2.7 G/DL (ref 3.5–5.2)
ALP SERPL-CCNC: 135 U/L (ref 55–135)
ALT SERPL W/O P-5'-P-CCNC: 13 U/L (ref 10–44)
ANION GAP SERPL CALC-SCNC: 8 MMOL/L (ref 8–16)
AST SERPL-CCNC: 18 U/L (ref 10–40)
BASOPHILS # BLD AUTO: 0.03 K/UL (ref 0–0.2)
BASOPHILS NFR BLD: 0.3 % (ref 0–1.9)
BILIRUB SERPL-MCNC: 0.7 MG/DL (ref 0.1–1)
BLD GP AB SCN CELLS X3 SERPL QL: NORMAL
BUN SERPL-MCNC: 8 MG/DL (ref 6–20)
CALCIUM SERPL-MCNC: 9 MG/DL (ref 8.7–10.5)
CHLORIDE SERPL-SCNC: 110 MMOL/L (ref 95–110)
CO2 SERPL-SCNC: 21 MMOL/L (ref 23–29)
CREAT SERPL-MCNC: 0.8 MG/DL (ref 0.5–1.4)
CREAT UR-MCNC: 74.6 MG/DL (ref 15–325)
DIFFERENTIAL METHOD: ABNORMAL
EOSINOPHIL # BLD AUTO: 0.1 K/UL (ref 0–0.5)
EOSINOPHIL NFR BLD: 1.2 % (ref 0–8)
ERYTHROCYTE [DISTWIDTH] IN BLOOD BY AUTOMATED COUNT: 13.9 % (ref 11.5–14.5)
EST. GFR  (NO RACE VARIABLE): >60 ML/MIN/1.73 M^2
GLUCOSE SERPL-MCNC: 102 MG/DL (ref 70–110)
HCO3 UR-SCNC: 24.3 MMOL/L (ref 24–28)
HCO3 UR-SCNC: 25.2 MMOL/L (ref 24–28)
HCT VFR BLD AUTO: 35.5 % (ref 37–48.5)
HGB BLD-MCNC: 12.1 G/DL (ref 12–16)
IMM GRANULOCYTES # BLD AUTO: 0.12 K/UL (ref 0–0.04)
IMM GRANULOCYTES NFR BLD AUTO: 1.1 % (ref 0–0.5)
LYMPHOCYTES # BLD AUTO: 1.5 K/UL (ref 1–4.8)
LYMPHOCYTES NFR BLD: 14.4 % (ref 18–48)
MCH RBC QN AUTO: 27.6 PG (ref 27–31)
MCHC RBC AUTO-ENTMCNC: 34.1 G/DL (ref 32–36)
MCV RBC AUTO: 81 FL (ref 82–98)
MONOCYTES # BLD AUTO: 1 K/UL (ref 0.3–1)
MONOCYTES NFR BLD: 9.3 % (ref 4–15)
NEUTROPHILS # BLD AUTO: 7.8 K/UL (ref 1.8–7.7)
NEUTROPHILS NFR BLD: 73.7 % (ref 38–73)
NRBC BLD-RTO: 0 /100 WBC
PCO2 BLDA: 62.5 MMHG (ref 35–45)
PCO2 BLDA: 71.2 MMHG (ref 35–45)
PH SMN: 7.16 [PH] (ref 7.35–7.45)
PH SMN: 7.2 [PH] (ref 7.35–7.45)
PLATELET # BLD AUTO: 173 K/UL (ref 150–450)
PMV BLD AUTO: 9.9 FL (ref 9.2–12.9)
PO2 BLDA: 12 MMHG (ref 80–100)
PO2 BLDA: 17 MMHG (ref 80–100)
POC BE: -4 MMOL/L
POC BE: -4 MMOL/L
POC SATURATED O2: 17 % (ref 95–100)
POC SATURATED O2: 8 % (ref 95–100)
POC TCO2: 26 MMOL/L (ref 23–27)
POC TCO2: 27 MMOL/L (ref 23–27)
POTASSIUM SERPL-SCNC: 4.2 MMOL/L (ref 3.5–5.1)
PROT SERPL-MCNC: 6 G/DL (ref 6–8.4)
PROT UR-MCNC: 20 MG/DL (ref 0–15)
PROT/CREAT UR: 0.27 MG/G{CREAT} (ref 0–0.2)
RBC # BLD AUTO: 4.39 M/UL (ref 4–5.4)
SAMPLE: ABNORMAL
SAMPLE: ABNORMAL
SODIUM SERPL-SCNC: 139 MMOL/L (ref 136–145)
WBC # BLD AUTO: 10.59 K/UL (ref 3.9–12.7)

## 2022-11-23 PROCEDURE — C1751 CATH, INF, PER/CENT/MIDLINE: HCPCS | Performed by: ANESTHESIOLOGY

## 2022-11-23 PROCEDURE — 62326 NJX INTERLAMINAR LMBR/SAC: CPT | Performed by: STUDENT IN AN ORGANIZED HEALTH CARE EDUCATION/TRAINING PROGRAM

## 2022-11-23 PROCEDURE — 59400 PR FULL ROUT OBSTE CARE,VAGINAL DELIV: ICD-10-PCS | Mod: GB,,, | Performed by: OBSTETRICS & GYNECOLOGY

## 2022-11-23 PROCEDURE — 85025 COMPLETE CBC W/AUTO DIFF WBC: CPT | Performed by: STUDENT IN AN ORGANIZED HEALTH CARE EDUCATION/TRAINING PROGRAM

## 2022-11-23 PROCEDURE — 11000001 HC ACUTE MED/SURG PRIVATE ROOM

## 2022-11-23 PROCEDURE — 72200006 HC VAGINAL DELIVERY LEVEL III

## 2022-11-23 PROCEDURE — 59400 PRA FULL ROUT OBSTE CARE,VAGINAL DELIV: ICD-10-PCS | Mod: QY,,, | Performed by: ANESTHESIOLOGY

## 2022-11-23 PROCEDURE — 25000003 PHARM REV CODE 250

## 2022-11-23 PROCEDURE — 25000003 PHARM REV CODE 250: Performed by: STUDENT IN AN ORGANIZED HEALTH CARE EDUCATION/TRAINING PROGRAM

## 2022-11-23 PROCEDURE — 27200710 HC EPIDURAL INFUSION PUMP SET: Performed by: ANESTHESIOLOGY

## 2022-11-23 PROCEDURE — 63600175 PHARM REV CODE 636 W HCPCS: Performed by: STUDENT IN AN ORGANIZED HEALTH CARE EDUCATION/TRAINING PROGRAM

## 2022-11-23 PROCEDURE — 80053 COMPREHEN METABOLIC PANEL: CPT | Performed by: STUDENT IN AN ORGANIZED HEALTH CARE EDUCATION/TRAINING PROGRAM

## 2022-11-23 PROCEDURE — 59400 OBSTETRICAL CARE: CPT | Mod: QY,,, | Performed by: ANESTHESIOLOGY

## 2022-11-23 PROCEDURE — 51702 INSERT TEMP BLADDER CATH: CPT

## 2022-11-23 PROCEDURE — 59400 OBSTETRICAL CARE: CPT | Mod: GB,,, | Performed by: OBSTETRICS & GYNECOLOGY

## 2022-11-23 PROCEDURE — 72100002 HC LABOR CARE, 1ST 8 HOURS

## 2022-11-23 PROCEDURE — 84156 ASSAY OF PROTEIN URINE: CPT | Performed by: STUDENT IN AN ORGANIZED HEALTH CARE EDUCATION/TRAINING PROGRAM

## 2022-11-23 PROCEDURE — 63600175 PHARM REV CODE 636 W HCPCS

## 2022-11-23 PROCEDURE — 86850 RBC ANTIBODY SCREEN: CPT | Performed by: STUDENT IN AN ORGANIZED HEALTH CARE EDUCATION/TRAINING PROGRAM

## 2022-11-23 PROCEDURE — 72100003 HC LABOR CARE, EA. ADDL. 8 HRS

## 2022-11-23 RX ORDER — PROCHLORPERAZINE EDISYLATE 5 MG/ML
5 INJECTION INTRAMUSCULAR; INTRAVENOUS EVERY 6 HOURS PRN
Status: DISCONTINUED | OUTPATIENT
Start: 2022-11-23 | End: 2022-11-23

## 2022-11-23 RX ORDER — ACETAMINOPHEN 500 MG
1000 TABLET ORAL EVERY 6 HOURS PRN
Status: DISCONTINUED | OUTPATIENT
Start: 2022-11-23 | End: 2022-11-25 | Stop reason: HOSPADM

## 2022-11-23 RX ORDER — SODIUM CHLORIDE 0.9 % (FLUSH) 0.9 %
2 SYRINGE (ML) INJECTION
Status: DISCONTINUED | OUTPATIENT
Start: 2022-11-23 | End: 2022-11-25 | Stop reason: HOSPADM

## 2022-11-23 RX ORDER — OXYTOCIN/RINGER'S LACTATE 30/500 ML
334 PLASTIC BAG, INJECTION (ML) INTRAVENOUS ONCE
Status: DISCONTINUED | OUTPATIENT
Start: 2022-11-23 | End: 2022-11-23

## 2022-11-23 RX ORDER — ACETAMINOPHEN 325 MG/1
650 TABLET ORAL EVERY 6 HOURS PRN
Status: DISCONTINUED | OUTPATIENT
Start: 2022-11-23 | End: 2022-11-25 | Stop reason: HOSPADM

## 2022-11-23 RX ORDER — ESCITALOPRAM OXALATE 10 MG/1
20 TABLET ORAL DAILY
Status: DISCONTINUED | OUTPATIENT
Start: 2022-11-23 | End: 2022-11-25 | Stop reason: HOSPADM

## 2022-11-23 RX ORDER — OXYCODONE HYDROCHLORIDE 5 MG/1
5 TABLET ORAL EVERY 4 HOURS PRN
Status: DISCONTINUED | OUTPATIENT
Start: 2022-11-23 | End: 2022-11-25 | Stop reason: HOSPADM

## 2022-11-23 RX ORDER — FAMOTIDINE 10 MG/ML
20 INJECTION INTRAVENOUS ONCE
Status: DISCONTINUED | OUTPATIENT
Start: 2022-11-23 | End: 2022-11-23

## 2022-11-23 RX ORDER — TRANEXAMIC ACID 100 MG/ML
1000 INJECTION, SOLUTION INTRAVENOUS ONCE AS NEEDED
Status: DISCONTINUED | OUTPATIENT
Start: 2022-11-23 | End: 2022-11-23

## 2022-11-23 RX ORDER — DIPHENHYDRAMINE HYDROCHLORIDE 50 MG/ML
25 INJECTION INTRAMUSCULAR; INTRAVENOUS EVERY 4 HOURS PRN
Status: DISCONTINUED | OUTPATIENT
Start: 2022-11-23 | End: 2022-11-25 | Stop reason: HOSPADM

## 2022-11-23 RX ORDER — DOCUSATE SODIUM 100 MG/1
200 CAPSULE, LIQUID FILLED ORAL 2 TIMES DAILY PRN
Status: DISCONTINUED | OUTPATIENT
Start: 2022-11-23 | End: 2022-11-25 | Stop reason: HOSPADM

## 2022-11-23 RX ORDER — FENTANYL/BUPIVACAINE/NS/PF 2MCG/ML-.1
PLASTIC BAG, INJECTION (ML) INJECTION CONTINUOUS PRN
Status: DISCONTINUED | OUTPATIENT
Start: 2022-11-23 | End: 2022-11-30

## 2022-11-23 RX ORDER — SODIUM CHLORIDE 9 MG/ML
INJECTION, SOLUTION INTRAVENOUS
Status: DISCONTINUED | OUTPATIENT
Start: 2022-11-23 | End: 2022-11-23

## 2022-11-23 RX ORDER — CALCIUM CARBONATE 200(500)MG
500 TABLET,CHEWABLE ORAL 3 TIMES DAILY PRN
Status: DISCONTINUED | OUTPATIENT
Start: 2022-11-23 | End: 2022-11-23

## 2022-11-23 RX ORDER — CEFAZOLIN SODIUM 2 G/50ML
2 SOLUTION INTRAVENOUS ONCE AS NEEDED
Status: DISCONTINUED | OUTPATIENT
Start: 2022-11-23 | End: 2022-11-23

## 2022-11-23 RX ORDER — FENTANYL CITRATE 50 UG/ML
INJECTION, SOLUTION INTRAMUSCULAR; INTRAVENOUS
Status: COMPLETED
Start: 2022-11-23 | End: 2022-11-23

## 2022-11-23 RX ORDER — CARBOPROST TROMETHAMINE 250 UG/ML
250 INJECTION, SOLUTION INTRAMUSCULAR
Status: CANCELLED | OUTPATIENT
Start: 2022-11-23

## 2022-11-23 RX ORDER — MISOPROSTOL 200 UG/1
800 TABLET ORAL
Status: CANCELLED | OUTPATIENT
Start: 2022-11-23

## 2022-11-23 RX ORDER — LIDOCAINE HYDROCHLORIDE 10 MG/ML
10 INJECTION INFILTRATION; PERINEURAL ONCE AS NEEDED
Status: DISCONTINUED | OUTPATIENT
Start: 2022-11-23 | End: 2022-11-23

## 2022-11-23 RX ORDER — DIPHENHYDRAMINE HYDROCHLORIDE 50 MG/ML
12.5 INJECTION INTRAMUSCULAR; INTRAVENOUS EVERY 4 HOURS PRN
Status: DISCONTINUED | OUTPATIENT
Start: 2022-11-23 | End: 2022-11-23

## 2022-11-23 RX ORDER — LIDOCAINE HYDROCHLORIDE AND EPINEPHRINE 15; 5 MG/ML; UG/ML
INJECTION, SOLUTION EPIDURAL
Status: DISCONTINUED | OUTPATIENT
Start: 2022-11-23 | End: 2022-11-30

## 2022-11-23 RX ORDER — MISOPROSTOL 200 UG/1
TABLET ORAL
Status: DISCONTINUED
Start: 2022-11-23 | End: 2022-11-23 | Stop reason: WASHOUT

## 2022-11-23 RX ORDER — HYDROCORTISONE 25 MG/G
CREAM TOPICAL 3 TIMES DAILY PRN
Status: DISCONTINUED | OUTPATIENT
Start: 2022-11-23 | End: 2022-11-25 | Stop reason: HOSPADM

## 2022-11-23 RX ORDER — SIMETHICONE 80 MG
1 TABLET,CHEWABLE ORAL 4 TIMES DAILY PRN
Status: DISCONTINUED | OUTPATIENT
Start: 2022-11-23 | End: 2022-11-23

## 2022-11-23 RX ORDER — OXYTOCIN/RINGER'S LACTATE 30/500 ML
95 PLASTIC BAG, INJECTION (ML) INTRAVENOUS ONCE
Status: DISCONTINUED | OUTPATIENT
Start: 2022-11-23 | End: 2022-11-23

## 2022-11-23 RX ORDER — PROCHLORPERAZINE EDISYLATE 5 MG/ML
5 INJECTION INTRAMUSCULAR; INTRAVENOUS EVERY 6 HOURS PRN
Status: DISCONTINUED | OUTPATIENT
Start: 2022-11-23 | End: 2022-11-25 | Stop reason: HOSPADM

## 2022-11-23 RX ORDER — BUPIVACAINE HYDROCHLORIDE 2.5 MG/ML
INJECTION, SOLUTION INFILTRATION; PERINEURAL
Status: DISCONTINUED | OUTPATIENT
Start: 2022-11-23 | End: 2022-11-30

## 2022-11-23 RX ORDER — ONDANSETRON 8 MG/1
8 TABLET, ORALLY DISINTEGRATING ORAL EVERY 8 HOURS PRN
Status: DISCONTINUED | OUTPATIENT
Start: 2022-11-23 | End: 2022-11-23

## 2022-11-23 RX ORDER — FENTANYL CITRATE 50 UG/ML
INJECTION, SOLUTION INTRAMUSCULAR; INTRAVENOUS
Status: DISCONTINUED | OUTPATIENT
Start: 2022-11-23 | End: 2022-11-30

## 2022-11-23 RX ORDER — DIPHENOXYLATE HYDROCHLORIDE AND ATROPINE SULFATE 2.5; .025 MG/1; MG/1
1 TABLET ORAL 4 TIMES DAILY PRN
Status: CANCELLED | OUTPATIENT
Start: 2022-11-23

## 2022-11-23 RX ORDER — SODIUM CHLORIDE, SODIUM LACTATE, POTASSIUM CHLORIDE, CALCIUM CHLORIDE 600; 310; 30; 20 MG/100ML; MG/100ML; MG/100ML; MG/100ML
INJECTION, SOLUTION INTRAVENOUS CONTINUOUS
Status: DISCONTINUED | OUTPATIENT
Start: 2022-11-23 | End: 2022-11-23

## 2022-11-23 RX ORDER — OXYTOCIN/RINGER'S LACTATE 30/500 ML
95 PLASTIC BAG, INJECTION (ML) INTRAVENOUS ONCE
Status: COMPLETED | OUTPATIENT
Start: 2022-11-23 | End: 2022-11-23

## 2022-11-23 RX ORDER — OXYTOCIN/RINGER'S LACTATE 30/500 ML
0-32 PLASTIC BAG, INJECTION (ML) INTRAVENOUS CONTINUOUS
Status: DISCONTINUED | OUTPATIENT
Start: 2022-11-23 | End: 2022-11-23

## 2022-11-23 RX ORDER — FENTANYL/BUPIVACAINE/NS/PF 2MCG/ML-.1
10 PLASTIC BAG, INJECTION (ML) INJECTION CONTINUOUS
Status: DISCONTINUED | OUTPATIENT
Start: 2022-11-23 | End: 2022-11-23

## 2022-11-23 RX ORDER — FENTANYL/BUPIVACAINE/NS/PF 2MCG/ML-.1
PLASTIC BAG, INJECTION (ML) INJECTION
Status: COMPLETED
Start: 2022-11-23 | End: 2022-11-23

## 2022-11-23 RX ORDER — METHYLERGONOVINE MALEATE 0.2 MG/ML
200 INJECTION INTRAVENOUS
Status: CANCELLED | OUTPATIENT
Start: 2022-11-23

## 2022-11-23 RX ORDER — ONDANSETRON 8 MG/1
8 TABLET, ORALLY DISINTEGRATING ORAL EVERY 8 HOURS PRN
Status: DISCONTINUED | OUTPATIENT
Start: 2022-11-23 | End: 2022-11-25 | Stop reason: HOSPADM

## 2022-11-23 RX ORDER — DIPHENHYDRAMINE HCL 25 MG
25 CAPSULE ORAL EVERY 4 HOURS PRN
Status: DISCONTINUED | OUTPATIENT
Start: 2022-11-23 | End: 2022-11-25 | Stop reason: HOSPADM

## 2022-11-23 RX ORDER — OXYCODONE HYDROCHLORIDE 5 MG/1
10 TABLET ORAL EVERY 4 HOURS PRN
Status: DISCONTINUED | OUTPATIENT
Start: 2022-11-23 | End: 2022-11-25 | Stop reason: HOSPADM

## 2022-11-23 RX ORDER — SODIUM CITRATE AND CITRIC ACID MONOHYDRATE 334; 500 MG/5ML; MG/5ML
30 SOLUTION ORAL ONCE
Status: DISCONTINUED | OUTPATIENT
Start: 2022-11-23 | End: 2022-11-23

## 2022-11-23 RX ORDER — BUPIVACAINE HYDROCHLORIDE 2.5 MG/ML
INJECTION, SOLUTION EPIDURAL; INFILTRATION; INTRACAUDAL
Status: DISPENSED
Start: 2022-11-23 | End: 2022-11-23

## 2022-11-23 RX ADMIN — FENTANYL CITRATE 100 MCG: 0.05 INJECTION, SOLUTION INTRAMUSCULAR; INTRAVENOUS at 01:11

## 2022-11-23 RX ADMIN — ACETAMINOPHEN 1000 MG: 500 TABLET ORAL at 12:11

## 2022-11-23 RX ADMIN — Medication 4 MILLI-UNITS/MIN: at 02:11

## 2022-11-23 RX ADMIN — ACETAMINOPHEN 1000 MG: 500 TABLET ORAL at 08:11

## 2022-11-23 RX ADMIN — SODIUM CHLORIDE, SODIUM LACTATE, POTASSIUM CHLORIDE, AND CALCIUM CHLORIDE 1000 ML: .6; .31; .03; .02 INJECTION, SOLUTION INTRAVENOUS at 01:11

## 2022-11-23 RX ADMIN — FENTANYL CITRATE 100 MCG: 0.05 INJECTION, SOLUTION INTRAMUSCULAR; INTRAVENOUS at 10:11

## 2022-11-23 RX ADMIN — LIDOCAINE HYDROCHLORIDE,EPINEPHRINE BITARTRATE 3 ML: 15; .005 INJECTION, SOLUTION EPIDURAL; INFILTRATION; INTRACAUDAL; PERINEURAL at 01:11

## 2022-11-23 RX ADMIN — DOCUSATE SODIUM 200 MG: 100 CAPSULE, LIQUID FILLED ORAL at 08:11

## 2022-11-23 RX ADMIN — SODIUM CHLORIDE, SODIUM LACTATE, POTASSIUM CHLORIDE, AND CALCIUM CHLORIDE: .6; .31; .03; .02 INJECTION, SOLUTION INTRAVENOUS at 01:11

## 2022-11-23 RX ADMIN — Medication 10 ML/HR: at 01:11

## 2022-11-23 RX ADMIN — BUPIVACAINE HYDROCHLORIDE 6 ML: 2.5 INJECTION, SOLUTION INFILTRATION; PERINEURAL at 10:11

## 2022-11-23 RX ADMIN — OXYCODONE 5 MG: 5 TABLET ORAL at 11:11

## 2022-11-23 RX ADMIN — Medication 95 MILLI-UNITS/MIN: at 03:11

## 2022-11-23 RX ADMIN — BUPIVACAINE HYDROCHLORIDE 5 ML: 2.5 INJECTION, SOLUTION EPIDURAL; INFILTRATION; INTRACAUDAL; PERINEURAL at 01:11

## 2022-11-23 RX ADMIN — SODIUM CHLORIDE, SODIUM LACTATE, POTASSIUM CHLORIDE, AND CALCIUM CHLORIDE: .6; .31; .03; .02 INJECTION, SOLUTION INTRAVENOUS at 03:11

## 2022-11-23 NOTE — L&D DELIVERY NOTE
[facility] - Labor & Delivery  Vaginal Delivery   Operative Note    SUMMARY     Forceps assisted vaginal delivery of live infant after pushing for approximately 15 minutes. Repetitive deep late decelerations were noted with contractions and operative delivery was recommended to patient for fetal indication. Patient agreed to forceps assisted delivery.     Pelvis was felt to be adequate. Epidural anesthesia was adequate. Bladder was drained 15 minutes prior. Fetal head position known and rotated to direct OA with placement of first blade. Station was +2 with pushing. Articulation of blades was easily achieved. Confirmation of correct placement was confirmed with palpation of fetal suture lines. Fetal descent noted with gentle traction. Two pulls with maternal effort were required before disarticulating forceps at +3 station.     Skin to skin was unable to be performed due to non-vigorous infant - handed off to awaiting NICU team. Infant delivered OA over intact perineum. Nuchal cord: Yes, cord reduced following delivery. Spontaneous delivery of placenta and IV pitocin given noting good uterine tone. No lacerations noted. Patient tolerated delivery well. Sponge needle and lap counted correctly x2.    Indications: Forceps delivery  Pregnancy complicated by:   Patient Active Problem List   Diagnosis    GI bleed    Upper GI bleed    Gastric ulcer with hemorrhage    Hyperglycemia    Mild protein malnutrition    Gestational hypertension    Pre-eclampsia    Postpartum depression    Grief    Insomnia disorder, with non-sleep disorder mental comorbidity    Generalized anxiety disorder with panic attacks    Depression    Panic disorder    Forceps delivery     Admitting GA: 39w2d    Delivery Information for Singh Lee    Birth information:  YOB: 2023   Time of birth: 11:44 AM   Sex: male   Head Delivery Date/Time: 11/23/2022 11:43 AM   Delivery type: Vaginal, Forceps   Gestational Age: 39w2d    Delivery  "Providers    Delivering clinician: Paulina Baptiste MD   Provider Role    Teto Sidhu MD Delivery Assist    Carolina Craig, KAYLEY Delivery Nurse    Bernice Serrano RN Charge Nurse    Gloria Gilmore Surgical Tech              Measurements    Weight: 3360 g  Weight (lbs): 7 lb 6.5 oz  Length: 52.7 cm  Length (in): 20.75"  Head circumference: 35.6 cm  Chest circumference: 33.7 cm         Apgars    Living status: Living  Apgars:  1 min.:  5 min.:  10 min.:  15 min.:  20 min.:    Skin color:  0  1       Heart rate:  2  2       Reflex irritability:  2  2       Muscle tone:  2  2       Respiratory effort:  2  2       Total:  8  9       Apgars assigned by: KAYLEY PINEDO [facility]         Operative Delivery    Forceps attempted?: Yes  Forceps indications: Fetal Heart Rate or Rhythm Abnormality  Forceps type: Enciso-Luikart  Forceps application location: Low  Number of pulls with forceps: 2  Total forceps application time: 68 seconds  Forceps applied by: MIRZA MCFARLAND  Failed forceps delivery?: No  Vacuum extractor attempted?: No         Shoulder Dystocia    Shoulder dystocia present?: No           Presentation    Presentation: Vertex  Position: Left Occiput Anterior           Interventions/Resuscitation    Method: NICU Attended       Cord    Vessels: 3 vessels  Complications: Nuchal  Nuchal Cord Description: tight nuchal cord  Number of Loops: 1  Delayed Cord Clamping?: No  Cord Clamped Date/Time: 2022 11:44 AM  Cord Blood Disposition: Sent with Baby  Gases Sent?: No  Stem Cell Collection (by MD): No       Placenta    Placenta delivery date/time: 2022 1149  Placenta removal: Spontaneous  Placenta appearance: Intact  Placenta disposition: discarded           Labor Events:       labor: No     Labor Onset Date/Time:         Dilation Complete Date/Time: 2022 11:15     Start Pushing Date/Time: 2022 11:34     Rupture Date/Time: 22  0902         Rupture type: ARM (Artificial Rupture)           Fluid " Amount:         Fluid Color: Clear         steroids: None     Antibiotics given for GBS: No     Induction: oxytocin     Indications for induction:  Elective     Augmentation: amniotomy     Indications for augmentation: Ineffective Contraction Pattern     Labor complications: None     Additional complications:          Cervical ripening:                     Delivery:      Episiotomy: None     Indication for Episiotomy:       Perineal Lacerations: None Repaired:      Periurethral Laceration:   Repaired:     Labial Laceration:   Repaired:     Sulcus Laceration:   Repaired:     Vaginal Laceration:   Repaired:     Cervical Laceration:   Repaired:     Repair suture:       Repair # of packets: 0     Last Value - EBL - Nursing (mL):       Sum - EBL - Nursing (mL): 0     Last Value - EBL - Anesthesia (mL):        Calculated QBL (mL): 209        Vaginal Sweep Performed: Yes     Surgicount Correct: Yes       Other providers:       Anesthesia    Method: Epidural          Details (if applicable):  Trial of Labor      Categorization:      Priority:     Indications for :     Incision Type:       Additional  information:  Forceps:    Vacuum:    Breech:    Observed anomalies    Other (Comments):          LOKESH Sidhu MD  OBGYN PGY-4    I agree with delivery note. I was personally present during the critical portion(s) of the procedure performed by the resident and was immediately available to provide assistance as needed during the entire procedure.

## 2022-11-23 NOTE — PROGRESS NOTES
LABOR NOTE    S:  Complaints: No.  Epidural working:  yes  Resident to bedside for cervical check    O: BP (!) 140/82   Pulse 77   Temp 98.3 °F (36.8 °C) (Oral)   Resp 16   LMP 2022   SpO2 100%   Breastfeeding No       FHT: 130, mod chicho, + accels, - decels Cat 1 (reassuring)  CTX: q 4-5 minutes, pit @12  SVE: 5/80/-2, AROM clear      ASSESSMENT:   37 y.o.  at 39w2d, here for IOL    FHT reassuring    Active Hospital Problems    Diagnosis  POA    Encounter for induction of labor [Z34.90]  Not Applicable      Resolved Hospital Problems   No resolved problems to display.     Labor Course:  0100: 4/60/-3, starting pit  0500: 4/60/-3, pit @8  0900: 5/80/-2, AROM Clear, pit @12    PLAN:  Continue Close Maternal/Fetal Monitoring  Pitocin Augmentation per protocol  Recheck 4 hours or PRN    Kristen Jenkins MD PGY-1  Obstetrics and Gynecology  [facility]

## 2022-11-23 NOTE — PROGRESS NOTES
LABOR NOTE    S:  Complaints: No.  Epidural working:  yes  Resident to bedside for cervical check    O: BP (!) 140/78   Pulse 87   Temp 98.3 °F (36.8 °C) (Oral)   Resp 16   LMP 2022   SpO2 100%   Breastfeeding No       FHT: 130, mod chicho, + accels, + occasional late decels, overall reassuring  CTX: q 2-3 minutes, pit @ 8  SVE: 10/100/0      ASSESSMENT:   37 y.o.  at 39w2d, here for IOL    FHT reassuring    Active Hospital Problems    Diagnosis  POA    Encounter for induction of labor [Z34.90]  Not Applicable      Resolved Hospital Problems   No resolved problems to display.     Labor Course:  0100: 4/60/-3, starting pit  0500: 4/60/-3, pit @8  0900: 5/80/-2, AROM Clear, pit @12  1115: 10100/0, pit at 8    PLAN:  Continue Close Maternal/Fetal Monitoring  Pitocin Augmentation per protocol  Room to be set up for pushing, staff notified      Ekta Grande MD   OB/GYN PGY1  [facility]

## 2022-11-23 NOTE — ANESTHESIA PREPROCEDURE EVALUATION
Sarah Lee is a 37 y.o. female  at 39w2d wga presenting for IOL.     This pregnancy has been uncomplicated. But pt w/ mild ranges BP on presentation. She is asymptomatic but Pre-E labs sent.     OB History    Para Term  AB Living   4 2 1 1 1 2   SAB IAB Ectopic Multiple Live Births     1   0 2      # Outcome Date GA Lbr Alin/2nd Weight Sex Delivery Anes PTL Lv   4 Current            3 Term 17 37w1d / 00:30 2.92 kg (6 lb 7 oz) M Vag-Forceps EPI N WILLIAM      Complications: Shoulder Dystocia   2  12 36w6d 10:00 / 00:31 2.478 kg (5 lb 7.4 oz) M Vag-Spont EPI Y WILLIAM      Complications: Pre-eclampsia   1 IAB  7w0d             Birth Comments: D&E       Wt Readings from Last 1 Encounters:   11/15/22 1016 76.2 kg (167 lb 15.9 oz)       BP Readings from Last 3 Encounters:   22 (!) 144/99   11/15/22 126/84   22 133/87       Patient Active Problem List   Diagnosis    GI bleed    Upper GI bleed    Gastric ulcer with hemorrhage    Hyperglycemia    Mild protein malnutrition    Gestational hypertension    Pre-eclampsia    Postpartum depression    Grief    Insomnia disorder, with non-sleep disorder mental comorbidity    Generalized anxiety disorder with panic attacks    Depression    Panic disorder    Encounter for induction of labor       No past surgical history on file.    Social History     Socioeconomic History    Marital status: Single   Tobacco Use    Smoking status: Never    Smokeless tobacco: Never   Substance and Sexual Activity    Alcohol use: No    Drug use: No    Sexual activity: Not Currently     Partners: Male     Birth control/protection: None     Comment: single          Chemistry        Component Value Date/Time     2022 0100    K 4.2 2022 0100     2022 0100    CO2 21 (L) 2022 0100    BUN 8 2022 0100    CREATININE 0.8 2022 0100     2022 0100        Component Value Date/Time     CALCIUM 9.0 11/23/2022 0100    ALKPHOS 135 11/23/2022 0100    AST 18 11/23/2022 0100    ALT 13 11/23/2022 0100    BILITOT 0.7 11/23/2022 0100    ESTGFRAFRICA >60.0 12/13/2021 1644    EGFRNONAA >60.0 12/13/2021 1644            Lab Results   Component Value Date    WBC 10.59 11/23/2022    HGB 12.1 11/23/2022    HCT 35.5 (L) 11/23/2022    MCV 81 (L) 11/23/2022     11/23/2022       No results for input(s): PT, INR, PROTIME, APTT in the last 72 hours.        Pre-op Assessment    I have reviewed the Patient Summary Reports.     I have reviewed the Nursing Notes.    I have reviewed the Medications.     Review of Systems  Anesthesia Hx:  No problems with previous Anesthesia Denies Hx of Anesthetic complications  History of prior surgery of interest to airway management or planning: Denies Family Hx of Anesthesia complications.   Denies Personal Hx of Anesthesia complications.   Social:  Non-Smoker    Hematology/Oncology:     Oncology Normal     Cardiovascular:   Denies Hypertension.   Denies Angina. ECG has been reviewed.    Pulmonary:   Denies Shortness of breath.  Denies Recent URI.    Renal/:  Renal/ Normal     Hepatic/GI:   PUD, Denies GERD. Denies Liver Disease.    Neurological:   Denies CVA. Denies Seizures.    Endocrine:  Endocrine Normal Denies Diabetes.    Psych:   anxiety depression          Physical Exam  General: Well nourished, Cooperative and Alert    Airway:  Mallampati: II   Mouth Opening: Normal  TM Distance: Normal  Tongue: Normal  Neck ROM: Normal ROM    Dental:  Intact        Anesthesia Plan  Type of Anesthesia, risks & benefits discussed:    Anesthesia Type: Gen ETT, Epidural, Spinal, CSE  Intra-op Monitoring Plan: Standard ASA Monitors  Post Op Pain Control Plan: multimodal analgesia  Induction:  IV  Airway Plan: Direct, Post-Induction  Informed Consent: Informed consent signed with the Patient and all parties understand the risks and agree with anesthesia plan.  All questions answered.    ASA Score: 3  Day of Surgery Review of History & Physical: H&P Update referred to the surgeon/provider.    Ready For Surgery From Anesthesia Perspective.     .

## 2022-11-23 NOTE — PLAN OF CARE
Problem: Adult Inpatient Plan of Care  Goal: Plan of Care Review  Outcome: Ongoing, Progressing     Problem: Infection  Goal: Absence of Infection Signs and Symptoms  Outcome: Ongoing, Progressing  Intervention: Prevent or Manage Infection  Flowsheets (Taken 11/23/2022 0508)  Infection Management: aseptic technique maintained     Problem: Labor Pain (Labor)  Goal: Acceptable Pain Control  Outcome: Ongoing, Progressing  Intervention: Support Labor Pain Coping and Management  Flowsheets (Taken 11/23/2022 0508)  Sensory Stimulation Regulation:   quiet environment promoted   care clustered     Problem: Fall Injury Risk  Goal: Absence of Fall and Fall-Related Injury  Outcome: Ongoing, Progressing  Intervention: Promote Injury-Free Environment  Flowsheets (Taken 11/23/2022 0508)  Safety Promotion/Fall Prevention:   assistive device/personal item within reach   family to remain at bedside   pulse ox   side rails raised x 2   lighting adjusted     Problem: Pain Acute  Goal: Acceptable Pain Control and Functional Ability  Outcome: Ongoing, Progressing     Problem: Urinary Retention (Anesthesia/Analgesia, Neuraxial)  Goal: Effective Urinary Elimination  Outcome: Ongoing, Progressing  Intervention: Promote Effective Urine Elimination  Flowsheets (Taken 11/23/2022 0508)  Urinary Elimination Promotion: catheter patency maintained

## 2022-11-23 NOTE — PROGRESS NOTES
LABOR NOTE    S:  Complaints: No.  Epidural working:  yes  Resident to bedside for cervical check    O: /82   Pulse 93   Temp 98.5 °F (36.9 °C) (Oral)   Resp 19   LMP 2022   SpO2 100%   Breastfeeding No       FHT: 130, mod chicho, + accels, - decels Cat 1 (reassuring)  CTX: q 4-5 minutes  SVE: /-3      ASSESSMENT:   37 y.o.  at 39w2d, here for IOL    FHT reassuring    Active Hospital Problems    Diagnosis  POA    Encounter for induction of labor [Z34.90]  Not Applicable      Resolved Hospital Problems   No resolved problems to display.     Labor Course:  0100: /-3, starting pit  0500: /-3, pit @8    PLAN:  Continue Close Maternal/Fetal Monitoring  Pitocin Augmentation per protocol  Recheck 4 hours or PRN    Afia Mitchell MD  [facility]   OBGYN PGY1

## 2022-11-23 NOTE — INTERVAL H&P NOTE
Sarah Lee is 37 y.o.  at 39w2d wga presenting for IOL.     FHT: 140 bpm, moderate BTBV, +accels, -decels; Cat 1 (reassuring)  [place]: irregular contractions  Presentation: cephalic by ultrasound    SVE: 60/-3    1) Induction of Labor  - Plan for IOL with pitocin    2) Elevated BP  - mild range blood pressures on admission  - PreE labs ordered  - Asymptomatic    Contraception: OCPs     Active Hospital Problems    Diagnosis  POA    Encounter for induction of labor [Z34.90]  Not Applicable      Resolved Hospital Problems   No resolved problems to display.       fAia Mitchell MD  [facility]   OBGYN PGY1

## 2022-11-23 NOTE — ANESTHESIA PROCEDURE NOTES
Epidural    Patient location during procedure: OB   Reason for block: primary anesthetic   Reason for block: labor analgesia requested by patient and obstetrician  Diagnosis: IUP   Start time: 11/23/2022 1:25 AM  Timeout: 11/23/2022 1:24 AM  End time: 11/23/2022 1:30 AM    Staffing  Performing Provider: Christiano Norton MD  Authorizing Provider: Meeta Ramirez MD        Preanesthetic Checklist  Completed: patient identified, IV checked, site marked, risks and benefits discussed, surgical consent, monitors and equipment checked, pre-op evaluation, timeout performed, anesthesia consent given, hand hygiene performed and patient being monitored  Preparation  Patient position: sitting  Prep: ChloraPrep  Patient monitoring: Blood Pressure and Pulse Ox  Reason for block: primary anesthetic   Epidural  Skin Anesthetic: lidocaine 1%  Administration type: single shot  Approach: midline  Interspace: L4-5    Injection technique: UMM saline  Needle and Epidural Catheter  Needle type: Tuohy   Needle gauge: 17  Needle length: 3.5 inches  Needle insertion depth: 6 cm  Catheter type: springwound  Catheter size: 19 G  Catheter at skin depth: 10 cm  Insertion Attempts: 1  Test dose: 3 mL of lidocaine 1.5% with Epi 1-to-200,000  Additional Documentation: incremental injection, no paresthesia on injection, no significant pain on injection, negative aspiration for heme and CSF, no signs/symptoms of IV or SA injection and no significant complaints from patient  Needle localization: anatomical landmarks  Assessment  Ease of block: easy  Patient's tolerance of the procedure: comfortable throughout block and no complaints No inadvertent dural puncture with Tuohy.  Dural puncture performed with spinal needle.

## 2022-11-24 LAB
BASOPHILS # BLD AUTO: 0.03 K/UL (ref 0–0.2)
BASOPHILS NFR BLD: 0.2 % (ref 0–1.9)
DIFFERENTIAL METHOD: ABNORMAL
EOSINOPHIL # BLD AUTO: 0.1 K/UL (ref 0–0.5)
EOSINOPHIL NFR BLD: 0.7 % (ref 0–8)
ERYTHROCYTE [DISTWIDTH] IN BLOOD BY AUTOMATED COUNT: 14.1 % (ref 11.5–14.5)
HCT VFR BLD AUTO: 34.9 % (ref 37–48.5)
HGB BLD-MCNC: 11.9 G/DL (ref 12–16)
IMM GRANULOCYTES # BLD AUTO: 0.11 K/UL (ref 0–0.04)
IMM GRANULOCYTES NFR BLD AUTO: 0.7 % (ref 0–0.5)
LYMPHOCYTES # BLD AUTO: 1.2 K/UL (ref 1–4.8)
LYMPHOCYTES NFR BLD: 7.8 % (ref 18–48)
MCH RBC QN AUTO: 28.1 PG (ref 27–31)
MCHC RBC AUTO-ENTMCNC: 34.1 G/DL (ref 32–36)
MCV RBC AUTO: 82 FL (ref 82–98)
MONOCYTES # BLD AUTO: 1.1 K/UL (ref 0.3–1)
MONOCYTES NFR BLD: 7.3 % (ref 4–15)
NEUTROPHILS # BLD AUTO: 12.5 K/UL (ref 1.8–7.7)
NEUTROPHILS NFR BLD: 83.3 % (ref 38–73)
NRBC BLD-RTO: 0 /100 WBC
PLATELET # BLD AUTO: 159 K/UL (ref 150–450)
PMV BLD AUTO: 10.4 FL (ref 9.2–12.9)
RBC # BLD AUTO: 4.24 M/UL (ref 4–5.4)
WBC # BLD AUTO: 15.04 K/UL (ref 3.9–12.7)

## 2022-11-24 PROCEDURE — 25000003 PHARM REV CODE 250: Performed by: STUDENT IN AN ORGANIZED HEALTH CARE EDUCATION/TRAINING PROGRAM

## 2022-11-24 PROCEDURE — 36415 COLL VENOUS BLD VENIPUNCTURE: CPT | Performed by: OBSTETRICS & GYNECOLOGY

## 2022-11-24 PROCEDURE — 11000001 HC ACUTE MED/SURG PRIVATE ROOM

## 2022-11-24 PROCEDURE — 85025 COMPLETE CBC W/AUTO DIFF WBC: CPT | Performed by: OBSTETRICS & GYNECOLOGY

## 2022-11-24 RX ORDER — IBUPROFEN 600 MG/1
600 TABLET ORAL 3 TIMES DAILY
Qty: 60 TABLET | Refills: 0 | Status: SHIPPED | OUTPATIENT
Start: 2022-11-24 | End: 2023-04-03

## 2022-11-24 RX ORDER — HYDROCODONE BITARTRATE AND ACETAMINOPHEN 5; 325 MG/1; MG/1
1 TABLET ORAL EVERY 6 HOURS PRN
Qty: 20 TABLET | Refills: 0 | Status: SHIPPED | OUTPATIENT
Start: 2022-11-24 | End: 2023-04-03

## 2022-11-24 RX ORDER — DOCUSATE SODIUM 100 MG/1
200 CAPSULE, LIQUID FILLED ORAL 2 TIMES DAILY PRN
Qty: 60 CAPSULE | Refills: 0 | Status: SHIPPED | OUTPATIENT
Start: 2022-11-24

## 2022-11-24 RX ADMIN — ACETAMINOPHEN 1000 MG: 500 TABLET ORAL at 11:11

## 2022-11-24 RX ADMIN — ACETAMINOPHEN 650 MG: 325 TABLET, FILM COATED ORAL at 10:11

## 2022-11-24 RX ADMIN — OXYCODONE 5 MG: 5 TABLET ORAL at 10:11

## 2022-11-24 RX ADMIN — OXYCODONE 5 MG: 5 TABLET ORAL at 11:11

## 2022-11-24 RX ADMIN — DOCUSATE SODIUM 200 MG: 100 CAPSULE, LIQUID FILLED ORAL at 08:11

## 2022-11-24 RX ADMIN — DOCUSATE SODIUM 200 MG: 100 CAPSULE, LIQUID FILLED ORAL at 09:11

## 2022-11-24 NOTE — ANESTHESIA POSTPROCEDURE EVALUATION
Anesthesia Post Evaluation    Patient: Sarah Lee    Procedure(s) Performed: * No procedures listed *    Final Anesthesia Type: epidural      Patient location during evaluation: labor & delivery  Patient participation: Yes- Able to Participate  Level of consciousness: awake and alert, awake and oriented  Post-procedure vital signs: reviewed and stable  Pain management: adequate  Airway patency: patent  OMA mitigation strategies: Multimodal analgesia and Use of major conduction anesthesia (spinal/epidural) or peripheral nerve block  PONV status at discharge: No PONV  Anesthetic complications: no      Cardiovascular status: blood pressure returned to baseline and hemodynamically stable  Respiratory status: unassisted, spontaneous ventilation and room air  Hydration status: euvolemic  Follow-up not needed.          Vitals Value Taken Time   /84 11/24/22 1549   Temp 37 °C (98.6 °F) 11/24/22 1549   Pulse 90 11/24/22 1549   Resp 16 11/24/22 1549   SpO2 100 % 11/24/22 1549         No case tracking events are documented in the log.      Pain/Richard Score: Pain Rating Prior to Med Admin: 6 (11/24/2022 11:15 AM)  Pain Rating Post Med Admin: 3 (11/24/2022 12:14 PM)

## 2022-11-24 NOTE — PROGRESS NOTES
POSTPARTUM PROGRESS NOTE    Subjective:     PPD/POD#: 1   Procedure: Forceps-assisted vaginal delivery   EGA: 39w2d   N/V: No   F/C: No   Abd Pain: Mild, well-controlled with oral pain medication   Lochia: Mild   Voiding: Yes   Ambulating: Yes   Bowel fnc: Yes   Breastfeeding: Yes   Contraception: Per primary OB   Circumcision: Consented.  Needs to be examined by OB attending.     Objective:      Temp:  [98.2 °F (36.8 °C)-98.8 °F (37.1 °C)] 98.8 °F (37.1 °C)  Pulse:  [0-134] 82  Resp:  [16-18] 18  SpO2:  [92 %-100 %] 97 %  BP: (112-176)/(65-92) 137/74    Lung: Normal respiratory effort   Abdomen: Soft, appropriately tender   Uterus: Firm, no fundal tenderness   Incision: N/A   : Deferred   Extremities: Bilateral trace edema     Lab Review    Recent Labs   Lab 11/23/22  0100      K 4.2      CO2 21*   BUN 8   CREATININE 0.8      PROT 6.0   BILITOT 0.7   ALKPHOS 135   ALT 13   AST 18       Recent Labs   Lab 11/23/22  0022 11/24/22  0645   WBC 10.59 15.04*   HGB 12.1 11.9*   HCT 35.5* 34.9*   MCV 81* 82    159         I/O    Intake/Output Summary (Last 24 hours) at 11/24/2022 0706  Last data filed at 11/23/2022 2037  Gross per 24 hour   Intake 1860.01 ml   Output 1809 ml   Net 51.01 ml        Assessment and Plan:   Postpartum care:  - Patient doing well.  - Continue routine management and advances.  - Pain well controlled with medication.  - Encourage ambulation.  - Advance diet as tolerated.       gHTN  - BP as above  - asymptomatic  - preE labs as above  - UOP: adequate  - Mag: not indicated  - Hypertensive agent not indicated    Depression  - Mood stable  - Not on any medication  - Will need 1-2 week postpartum mood check      Beckie Ni MD  PGY-1 OBGYN

## 2022-11-24 NOTE — PLAN OF CARE
"Plan of care:  Mother will feed baby on cue " 8 or more in 24" but not longer than 3 hrs between feedings; will attempt to latch baby onto breasts and breast feed for no longer than 30 min and then place baby back under phototherapy lights; will supplement baby c EBM/formula ad arsh by Paced bottle feeding; will double pump breasts using the Symphony pump and the Initiation pumping pattern c the most suction that is comfortable;  will monitor baby's 24hr diaper counts; will care for double collection kit as instructed; will call for assistance prn.   "

## 2022-11-24 NOTE — LACTATION NOTE
11/24/22 1010   Maternal Assessment   Breast Shape Bilateral:;round   Breast Density Bilateral:;soft   Areola Bilateral:;elastic   Nipples Bilateral:;everted;graspable   Maternal Infant Feeding   Maternal Emotional State assist needed;relaxed   Infant Positioning cross-cradle;clutch/football   Pain with Feeding yes   Pain Location nipples, bilateral   Pain Description   (pinching)   Comfort Measures Before/During Feeding infant position adjusted;maternal position adjusted;suction broken using finger   Nipple Shape After Feeding, Left lateral aspect compressed   Nipple Shape After Feeding, Right lateral aspect compressed   Latch Assistance yes  (max assistance to attempt to achieve deep latch)   Visited patient in room, holding fussy baby in arms, phototherapy lights on. Formula bottle present on over the bed tray. Mother states she would like to breastfeed but is having difficulty latching baby onto the breasts, has not past experience breastfeeding. Basic education provided while assistance provided. Maximum positioning and latch assistance provided, cross cradle and football positions, latches achieved but mother c/o pinching sensation, slight  compression of lateral aspects of nipples noted when baby removed from breast, unable to get comfortable latch.  Feeding options discussed.  Mother would like to begin pumping breasts.

## 2022-11-24 NOTE — LACTATION NOTE
Mother c/o that baby does not like the bottle.  Demonstrated and taught mother Paced bottle feeding technique, baby requires stimulation of nipple on palate to suck, dribbles formula out of side of mouth, required frequent pausing during feeding.

## 2022-11-25 VITALS
OXYGEN SATURATION: 98 % | HEART RATE: 89 BPM | RESPIRATION RATE: 16 BRPM | SYSTOLIC BLOOD PRESSURE: 139 MMHG | TEMPERATURE: 99 F | DIASTOLIC BLOOD PRESSURE: 76 MMHG

## 2022-11-25 PROCEDURE — 99024 PR POST-OP FOLLOW-UP VISIT: ICD-10-PCS | Mod: ,,, | Performed by: OBSTETRICS & GYNECOLOGY

## 2022-11-25 PROCEDURE — 25000003 PHARM REV CODE 250: Performed by: STUDENT IN AN ORGANIZED HEALTH CARE EDUCATION/TRAINING PROGRAM

## 2022-11-25 PROCEDURE — 99024 POSTOP FOLLOW-UP VISIT: CPT | Mod: ,,, | Performed by: OBSTETRICS & GYNECOLOGY

## 2022-11-25 RX ORDER — HYDROCODONE BITARTRATE AND ACETAMINOPHEN 5; 325 MG/1; MG/1
1 TABLET ORAL EVERY 6 HOURS PRN
Qty: 10 TABLET | Refills: 0 | Status: SHIPPED | OUTPATIENT
Start: 2022-11-25 | End: 2023-04-03

## 2022-11-25 RX ADMIN — ACETAMINOPHEN 650 MG: 325 TABLET, FILM COATED ORAL at 11:11

## 2022-11-25 RX ADMIN — DOCUSATE SODIUM 200 MG: 100 CAPSULE, LIQUID FILLED ORAL at 11:11

## 2022-11-25 NOTE — PROGRESS NOTES
POSTPARTUM PROGRESS NOTE    Subjective:     PPD/POD#: 2   Procedure: Forceps-assisted vaginal delivery   EGA: 39w2d   N/V: No   F/C: No   Abd Pain: Mild, well-controlled with oral pain medication   Lochia: Mild   Voiding: Yes   Ambulating: Yes   Bowel fnc: Yes   Breastfeeding: Yes   Contraception: Per primary OB   Circumcision: Consented.  Needs to be examined by OB attending.     Objective:      Temp:  [97.7 °F (36.5 °C)-98.6 °F (37 °C)] 98.1 °F (36.7 °C)  Pulse:  [88-90] 89  Resp:  [16-18] 18  SpO2:  [95 %-100 %] 100 %  BP: (128-139)/(76-84) 128/78    Lung: Normal respiratory effort   Abdomen: Soft, appropriately tender   Uterus: Firm, no fundal tenderness   Incision: N/A   : Deferred   Extremities: Bilateral trace edema     Lab Review    Recent Labs   Lab 11/23/22  0100      K 4.2      CO2 21*   BUN 8   CREATININE 0.8      PROT 6.0   BILITOT 0.7   ALKPHOS 135   ALT 13   AST 18         Recent Labs   Lab 11/23/22  0022 11/24/22  0645   WBC 10.59 15.04*   HGB 12.1 11.9*   HCT 35.5* 34.9*   MCV 81* 82    159           I/O  No intake or output data in the 24 hours ending 11/25/22 0615       Assessment and Plan:   Postpartum care:  - Patient doing well.  - Continue routine management and advances.  - Pain well controlled with medication.  - Encourage ambulation.  - Advance diet as tolerated.       gHTN  - BP as above  - asymptomatic  - preE labs as above  - UOP: adequate  - Mag: not indicated  - Hypertensive agent not indicated    Depression  - Mood stable  - Not on any medication  - Will need 1-2 week postpartum mood check        Afia Mithcell MD  [facility]   OBGYN PGY1

## 2022-11-25 NOTE — LACTATION NOTE
11/25/22 1032   Maternal Assessment   Breast Shape Bilateral:;round   Breast Density Bilateral:;soft   Areola Bilateral:;elastic   Nipples Bilateral:;everted   Equipment Type   Breast Pump Type double electric, hospital grade   Breast Pump Flange Type hard   Breast Pump Flange Size 24 mm  (dropped to 21mm)   Breast Pumping   Breast Pumping Interventions frequent pumping encouraged;early pumping promoted   Lactation Round: LC discussed the importance of baby remaining under photo-therapy and utilizing pump for breast stimulation. Pt acknowledged understanding and requested assistance with pumping. LC reinforced pumping education and assisted with pump session. LC encouraged Pt to contact [employer] to obtain breast pump for personal use. LC encouraged Pt to call for discharge education. LC placed contact information on board.

## 2022-11-25 NOTE — DISCHARGE SUMMARY
Delivery Discharge Summary  Obstetrics      Primary OB Clinician: Paulina Baptiste MD      Admission date: 2022  Discharge date: 2022    Disposition: To home, self care    Discharge Diagnosis List:      Patient Active Problem List   Diagnosis    GI bleed    Upper GI bleed    Gastric ulcer with hemorrhage    Hyperglycemia    Mild protein malnutrition    Gestational hypertension    Pre-eclampsia    Postpartum depression    Grief    Insomnia disorder, with non-sleep disorder mental comorbidity    Generalized anxiety disorder with panic attacks    Depression    Panic disorder    Forceps delivery       Procedure: Forceps delivery    Hospital Course:  Sarah Lee is a 37 y.o. now , PPD #2 who was admitted on 2022 at 39w2d for IOL. Patient was subsequently admitted to labor and delivery unit with signed consents.     Labor course was complicated by repetitive late decelerations and resulted in FAVD without complications.     Please see delivery note for further details. Her postpartum course was complicated by gHTN. She did not meet criteria to start hypertensive medications. On discharge day, patient's pain is controlled with oral pain medications. Pt is tolerating ambulation without SOB or CP, and regular diet without N/V. Reports lochia is mild. Denies any HA, vision changes, F/C, LE swelling. Denies any breast pain/soreness.    Pt in stable condition and ready for discharge. She has been instructed to start and/or continue medications and follow up with her obstetrics provider as listed below.    Pertinent studies:  CBC  Recent Labs   Lab 22  0022 22  0645   WBC 10.59 15.04*   HGB 12.1 11.9*   HCT 35.5* 34.9*   MCV 81* 82    159            Immunization History   Administered Date(s) Administered    Tdap 2010, 2012          Delivery:    Episiotomy: None   Lacerations: None   Repair suture:     Repair # of packets: 0   Blood loss (ml):       Birth  information:  YOB: 2023   Time of birth: 11:44 AM   Sex: male   Delivery type: Vaginal, Forceps   Gestational Age: 39w2d    Delivery Clinician:      Other providers:       Additional  information:  Forceps:    Vacuum:    Breech:    Observed anomalies      Living?:           APGARS  One minute Five minutes Ten minutes   Skin color:         Heart rate:         Grimace:         Muscle tone:         Breathing:         Totals: 8  9        Placenta: Delivered:       appearance    Patient Instructions:   Current Discharge Medication List        START taking these medications    Details   docusate sodium (COLACE) 100 MG capsule Take 2 capsules (200 mg total) by mouth 2 (two) times daily as needed for Constipation.  Qty: 60 capsule, Refills: 0      HYDROcodone-acetaminophen (NORCO) 5-325 mg per tablet Take 1 tablet by mouth every 6 (six) hours as needed for Pain.  Qty: 20 tablet, Refills: 0    Comments: Quantity prescribed more than 7 day supply? No      ibuprofen (ADVIL,MOTRIN) 600 MG tablet Take 1 tablet (600 mg total) by mouth 3 (three) times daily.  Qty: 60 tablet, Refills: 0           CONTINUE these medications which have NOT CHANGED    Details   aspirin 81 MG Chew Take 1 tablet (81 mg total) by mouth once daily.  Qty: 60 tablet, Refills: 4    Associated Diagnoses: Hx of preeclampsia, prior pregnancy, currently pregnant      EScitalopram oxalate (LEXAPRO) 20 MG tablet Take 1 tablet (20 mg total) by mouth once daily.  Qty: 90 tablet, Refills: 3    Associated Diagnoses: Panic disorder      omeprazole (PRILOSEC) 40 MG capsule Take 1 capsule (40 mg total) by mouth 2 (two) times daily before meals.  Qty: 60 capsule, Refills: 2    Associated Diagnoses: Gastroesophageal reflux disease, unspecified whether esophagitis present; Gastric ulcer, unspecified chronicity, unspecified whether gastric ulcer hemorrhage or perforation present      ondansetron (ZOFRAN-ODT) 4 MG TbDL Take 1 tablet (4 mg total) by mouth every  6 (six) hours as needed (Nausea and vomiting).  Qty: 30 tablet, Refills: 2    Associated Diagnoses: Nausea/vomiting in pregnancy      prenatal 105-iron-folic ac-dha 30 mg iron- 1.4 mg-300 mg Cmpk Take by mouth.             Discharge Procedure Orders   Diet Adult Regular     Lifting restrictions   Order Comments: No lifting > 10 lbs until postoperative appointment     No driving until:   Order Comments: No longer taking narcotics. Able to safely hit brakes without pain.     Pelvic Rest   Order Comments: Until cleared by primary OBGYN at follow up visit     No dressing needed     Notify your health care provider if you experience any of the following:  temperature >100.4     Notify your health care provider if you experience any of the following:  persistent nausea and vomiting or diarrhea     Notify your health care provider if you experience any of the following:  severe uncontrolled pain     Notify your health care provider if you experience any of the following:  redness, tenderness, or signs of infection (pain, swelling, redness, odor or green/yellow discharge around incision site)     Notify your health care provider if you experience any of the following:  difficulty breathing or increased cough     Notify your health care provider if you experience any of the following:  severe persistent headache     Notify your health care provider if you experience any of the following:  worsening rash     Notify your health care provider if you experience any of the following:  persistent dizziness, light-headedness, or visual disturbances     Notify your health care provider if you experience any of the following:  increased confusion or weakness     Notify your health care provider if you experience any of the following:   Order Comments: Vaginal bleeding saturating more than one pad per hour for >2 hours     Activity as tolerated        Follow-up Information       SUZANNE Orozco. Schedule an appointment as  soon as possible for a visit in 1 week(s).    Specialty: Obstetrics and Gynecology  Why: BP check up  Contact information:  [address], [address]  [facility] [address] [phone]                            Afia Mitchell MD  [facility]   OBGYN PGY1

## 2022-11-26 NOTE — NURSING
The following message was sent to [facility]'s staff: Hi, can you please call ms pineda to set up a post partum bp&mood check appt w/any provider. The appt should be on or before Friday 12/2. Thank you .   Note: MBcare guide was reviewed in detail w/pt,(after she was d/c'ed to the room with infant)  pt has handouts on anx/dep,pre-E&post birth warning signs, pt is aware she needs to follow up next week for bp&mood check appts.   Note: will chart teaching on infant's chart

## 2022-11-28 ENCOUNTER — PATIENT MESSAGE (OUTPATIENT)
Dept: OBSTETRICS AND GYNECOLOGY | Facility: OTHER | Age: 37
End: 2022-11-28
Payer: COMMERCIAL

## 2022-11-30 NOTE — TRANSFER OF CARE
Anesthesia Transfer of Care Note    Patient: Sarah Lee    Procedure(s) Performed: * No procedures listed *    Anesthesia PACU Handoff    Last vitals:   Visit Vitals  LMP 02/23/2022

## 2023-01-18 ENCOUNTER — PATIENT MESSAGE (OUTPATIENT)
Dept: OBSTETRICS AND GYNECOLOGY | Facility: CLINIC | Age: 38
End: 2023-01-18
Payer: COMMERCIAL

## 2023-01-19 PROBLEM — O14.90 PRE-ECLAMPSIA: Status: RESOLVED | Noted: 2017-06-08 | Resolved: 2023-01-19

## 2023-01-19 PROBLEM — F43.21 GRIEF: Status: RESOLVED | Noted: 2018-01-04 | Resolved: 2023-01-19

## 2023-01-19 PROBLEM — G47.00 INSOMNIA DISORDER, WITH NON-SLEEP DISORDER MENTAL COMORBIDITY: Status: RESOLVED | Noted: 2018-05-14 | Resolved: 2023-01-19

## 2023-01-27 ENCOUNTER — POSTPARTUM VISIT (OUTPATIENT)
Dept: OBSTETRICS AND GYNECOLOGY | Facility: CLINIC | Age: 38
End: 2023-01-27
Payer: COMMERCIAL

## 2023-01-27 ENCOUNTER — PATIENT MESSAGE (OUTPATIENT)
Dept: OBSTETRICS AND GYNECOLOGY | Facility: CLINIC | Age: 38
End: 2023-01-27

## 2023-01-27 VITALS — HEIGHT: 64 IN | WEIGHT: 145.5 LBS | BODY MASS INDEX: 24.84 KG/M2

## 2023-01-27 DIAGNOSIS — Z30.011 ENCOUNTER FOR INITIAL PRESCRIPTION OF CONTRACEPTIVE PILLS: ICD-10-CM

## 2023-01-27 DIAGNOSIS — F41.0 PANIC DISORDER: ICD-10-CM

## 2023-01-27 PROCEDURE — 99999 PR PBB SHADOW E&M-EST. PATIENT-LVL III: CPT | Mod: PBBFAC,,, | Performed by: OBSTETRICS & GYNECOLOGY

## 2023-01-27 PROCEDURE — 0503F POSTPARTUM CARE VISIT: CPT | Mod: S$GLB,,, | Performed by: OBSTETRICS & GYNECOLOGY

## 2023-01-27 PROCEDURE — 0503F PR POSTPARTUM CARE VISIT: ICD-10-PCS | Mod: S$GLB,,, | Performed by: OBSTETRICS & GYNECOLOGY

## 2023-01-27 PROCEDURE — 99999 PR PBB SHADOW E&M-EST. PATIENT-LVL III: ICD-10-PCS | Mod: PBBFAC,,, | Performed by: OBSTETRICS & GYNECOLOGY

## 2023-01-27 RX ORDER — ESCITALOPRAM OXALATE 20 MG/1
20 TABLET ORAL DAILY
Qty: 90 TABLET | Refills: 3 | Status: SHIPPED | OUTPATIENT
Start: 2023-01-27 | End: 2023-05-18 | Stop reason: SDUPTHER

## 2023-01-27 RX ORDER — LEVONORGESTREL AND ETHINYL ESTRADIOL 0.1-0.02MG
1 KIT ORAL DAILY
Qty: 28 TABLET | Refills: 12 | Status: SHIPPED | OUTPATIENT
Start: 2023-01-27 | End: 2024-01-26

## 2023-01-27 NOTE — LETTER
January 27, 2023      Braeden Luque, OBGYN  [address], [address]  [facility] [address]  Phone: [phone]  Fax: [phone]       Patient: Sarah Lee   YOB: 1985  Date of Visit: 01/27/2023    To Whom It May Concern:    Krista Lee  was at [facility] on 01/27/2023. The patient is suffering from post partum complications. Please allow her 4 more weeks of maternity leave due to these complications. If you have any questions or concerns, or if I can be of further assistance, please do not hesitate to contact me.    Sincerely,    Paulina Baptiste MD

## 2023-01-27 NOTE — PROGRESS NOTES
"Past medical, surgical, social, family, and obstetric histories; medications; prior records and results; and available outside records were reviewed and updated in the EMR.  Pertinent findings were noted below.    Reason for Visit   Routine Prenatal Visit    HPI   37 y.o. female     Patient's last menstrual period was 2022.    Delivery: FAVD on 23  Hospitalization: uncomplicated  Ambulating, tolerating Po, moving bowels: Y  Pain controlled: Y  Bleeding: menstrual cycles have returned  Breastfeeding: N  Breast pain or engorgement: N, stopped breastfeeding 4 weeks ago  Postpartum depression: Yes, having difficulty sleeping, concentrating, caring for  on own, has no support, FOB does not help "works all the time"  Incision: N  Contraception: None, desires to start pills    Denies suicidal ideations or thoughts of harming baby or other children    Pap: 2022, NILM/HPV(-)  Mammogram: N/A  Allergies: Patient has no known allergies.    Exam   Ht 5' 4" (1.626 m)   Wt 66 kg (145 lb 8.1 oz)   LMP 2022   Breastfeeding No   BMI 24.98 kg/m²     Physical Exam  Constitutional:       General: She is in acute distress.      Appearance: Normal appearance. She is normal weight. She is not ill-appearing or toxic-appearing.   HENT:      Head: Normocephalic and atraumatic.   Pulmonary:      Effort: Pulmonary effort is normal.   Musculoskeletal:         General: Normal range of motion.   Neurological:      General: No focal deficit present.      Mental Status: She is oriented to person, place, and time.   Psychiatric:         Mood and Affect: Mood normal.         Behavior: Behavior normal.         Thought Content: Thought content normal.         Judgment: Judgment normal.      Comments: Tearful when explaining her situation   Vitals reviewed.     Assessment and Plan   Postpartum depression  -     Ambulatory referral/consult to  Behavioral Health; Future; Expected date: 2023    Panic " disorder  -     EScitalopram oxalate (LEXAPRO) 20 MG tablet; Take 1 tablet (20 mg total) by mouth once daily.  Dispense: 90 tablet; Refill: 3    Encounter for initial prescription of contraceptive pills  -     levonorgestrel-ethinyl estradiol (AVIANE,ALESSE,LESSINA) 0.1-20 mg-mcg per tablet; Take 1 tablet by mouth once daily.  Dispense: 28 tablet; Refill: 12      Postpartum  Exam: WNL  Mood / depression screening: score 25 - see plan below  Lactation referral: N/A  Contraception: desires to start COCs - Rx provided  Restart lexapro, what patient has taken in past for depression  Urgent referral placed to Dr. Espinoza/ behavioral health  SI/HI and ER precautions given    Follow-up: PRN or for annual WWE

## 2023-01-31 ENCOUNTER — TELEPHONE (OUTPATIENT)
Dept: ENDOSCOPY | Facility: HOSPITAL | Age: 38
End: 2023-01-31

## 2023-01-31 NOTE — TELEPHONE ENCOUNTER
"----- Message from Zohra Henry sent at 1/31/2023  9:18 AM CST -----  Regarding: [facility]"  .Type: Patient Call Back    Who called: [facility]"    What is the request in detail:  Requesting a change in omeprazole (PRILOSEC) 40 MG capsule due to insurance wanting pt to use otc med    Can the clinic reply by [facility]? Call back     Would the patient rather a call back or a response via [facility]?  Call back     Best call back number: [phone]      "

## 2023-02-27 ENCOUNTER — OFFICE VISIT (OUTPATIENT)
Dept: PSYCHIATRY | Facility: CLINIC | Age: 38
End: 2023-02-27
Payer: COMMERCIAL

## 2023-02-27 DIAGNOSIS — F41.1 GENERALIZED ANXIETY DISORDER WITH PANIC ATTACKS: Primary | ICD-10-CM

## 2023-02-27 DIAGNOSIS — F41.0 GENERALIZED ANXIETY DISORDER WITH PANIC ATTACKS: Primary | ICD-10-CM

## 2023-02-27 DIAGNOSIS — F51.05 INSOMNIA DUE TO MENTAL DISORDER: ICD-10-CM

## 2023-02-27 DIAGNOSIS — F41.0 PANIC DISORDER: ICD-10-CM

## 2023-02-27 PROCEDURE — 90792 PR PSYCHIATRIC DIAGNOSTIC EVALUATION W/MEDICAL SERVICES: ICD-10-PCS | Mod: AF,HB,95, | Performed by: INTERNAL MEDICINE

## 2023-02-27 PROCEDURE — 90792 PSYCH DIAG EVAL W/MED SRVCS: CPT | Mod: AF,HB,95, | Performed by: INTERNAL MEDICINE

## 2023-02-27 RX ORDER — CLONAZEPAM 0.5 MG/1
.25-.5 TABLET ORAL DAILY PRN
Qty: 30 TABLET | Refills: 0 | Status: SHIPPED | OUTPATIENT
Start: 2023-02-27 | End: 2023-04-03 | Stop reason: SDUPTHER
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
## 2023-03-04 PROBLEM — F32.A DEPRESSION: Status: RESOLVED | Noted: 2021-09-19 | Resolved: 2023-03-04

## 2023-03-04 PROBLEM — F51.05 INSOMNIA DUE TO MENTAL DISORDER: Status: ACTIVE | Noted: 2023-03-04

## 2023-03-06 ENCOUNTER — TELEPHONE (OUTPATIENT)
Dept: PSYCHIATRY | Facility: CLINIC | Age: 38
End: 2023-03-06
Payer: COMMERCIAL

## 2023-03-06 NOTE — TELEPHONE ENCOUNTER
----- Message from Klarissa Small LCSW sent at 3/6/2023  8:51 AM CST -----  Regarding: FW: Postpartum referral  Can you reach out to schedule for in person please.   ----- Message -----  From: Sarah Espinoza MD  Sent: 3/4/2023   2:38 PM CST  To: Klarissa Small LCSW, #  Subject: Postpartum referral                              This patient is very much needing therapy soon.  She is very depressed and anxious and having difficulty functioning.  Could one of you see her!    Glenny - this  referral for the  was by accident - this is another Dr. Baptiste patient.

## 2023-03-07 ENCOUNTER — OFFICE VISIT (OUTPATIENT)
Dept: PSYCHIATRY | Facility: CLINIC | Age: 38
End: 2023-03-07
Payer: COMMERCIAL

## 2023-03-07 DIAGNOSIS — F51.05 INSOMNIA DUE TO MENTAL DISORDER: ICD-10-CM

## 2023-03-07 DIAGNOSIS — F41.0 GENERALIZED ANXIETY DISORDER WITH PANIC ATTACKS: ICD-10-CM

## 2023-03-07 DIAGNOSIS — F41.1 GENERALIZED ANXIETY DISORDER WITH PANIC ATTACKS: ICD-10-CM

## 2023-03-07 DIAGNOSIS — F41.0 PANIC DISORDER: ICD-10-CM

## 2023-03-07 PROCEDURE — 99214 OFFICE O/P EST MOD 30 MIN: CPT | Mod: 95,,, | Performed by: INTERNAL MEDICINE

## 2023-03-07 PROCEDURE — 99214 PR OFFICE/OUTPT VISIT, EST, LEVL IV, 30-39 MIN: ICD-10-PCS | Mod: 95,,, | Performed by: INTERNAL MEDICINE
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
## 2023-03-15 ENCOUNTER — TELEPHONE (OUTPATIENT)
Dept: PSYCHIATRY | Facility: CLINIC | Age: 38
End: 2023-03-15
Payer: COMMERCIAL

## 2023-03-22 ENCOUNTER — PATIENT MESSAGE (OUTPATIENT)
Dept: PSYCHIATRY | Facility: CLINIC | Age: 38
End: 2023-03-22
Payer: COMMERCIAL

## 2023-04-03 ENCOUNTER — PATIENT MESSAGE (OUTPATIENT)
Dept: PSYCHIATRY | Facility: CLINIC | Age: 38
End: 2023-04-03

## 2023-04-03 ENCOUNTER — OFFICE VISIT (OUTPATIENT)
Dept: PSYCHIATRY | Facility: CLINIC | Age: 38
End: 2023-04-03
Payer: COMMERCIAL

## 2023-04-03 DIAGNOSIS — F41.1 GENERALIZED ANXIETY DISORDER WITH PANIC ATTACKS: ICD-10-CM

## 2023-04-03 DIAGNOSIS — F41.0 PANIC DISORDER: ICD-10-CM

## 2023-04-03 DIAGNOSIS — F41.0 GENERALIZED ANXIETY DISORDER WITH PANIC ATTACKS: ICD-10-CM

## 2023-04-03 DIAGNOSIS — F51.05 INSOMNIA DUE TO MENTAL DISORDER: ICD-10-CM

## 2023-04-03 PROCEDURE — 99214 PR OFFICE/OUTPT VISIT, EST, LEVL IV, 30-39 MIN: ICD-10-PCS | Mod: 95,,, | Performed by: INTERNAL MEDICINE

## 2023-04-03 PROCEDURE — 99214 OFFICE O/P EST MOD 30 MIN: CPT | Mod: 95,,, | Performed by: INTERNAL MEDICINE

## 2023-04-03 RX ORDER — CLONAZEPAM 0.5 MG/1
.5-1 TABLET ORAL NIGHTLY PRN
Qty: 60 TABLET | Refills: 0 | Status: SHIPPED | OUTPATIENT
Start: 2023-04-03 | End: 2023-05-11 | Stop reason: SDUPTHER
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
## 2023-04-05 ENCOUNTER — TELEPHONE (OUTPATIENT)
Dept: PSYCHIATRY | Facility: CLINIC | Age: 38
End: 2023-04-05
Payer: COMMERCIAL

## 2023-04-05 NOTE — TELEPHONE ENCOUNTER
Called patient x2 to discuss FMLA paperwork I received.  If this is for continuous leave, I need to know the dates she will be away from work.  Left patient voicemail to send me a portal message with information.

## 2023-04-06 ENCOUNTER — PATIENT MESSAGE (OUTPATIENT)
Dept: PSYCHIATRY | Facility: CLINIC | Age: 38
End: 2023-04-06
Payer: COMMERCIAL

## 2023-04-11 ENCOUNTER — PATIENT MESSAGE (OUTPATIENT)
Dept: PSYCHIATRY | Facility: CLINIC | Age: 38
End: 2023-04-11
Payer: COMMERCIAL

## 2023-04-17 ENCOUNTER — PATIENT MESSAGE (OUTPATIENT)
Dept: PSYCHIATRY | Facility: CLINIC | Age: 38
End: 2023-04-17
Payer: COMMERCIAL

## 2023-05-11 DIAGNOSIS — F41.1 GENERALIZED ANXIETY DISORDER WITH PANIC ATTACKS: ICD-10-CM

## 2023-05-11 DIAGNOSIS — F41.0 GENERALIZED ANXIETY DISORDER WITH PANIC ATTACKS: ICD-10-CM

## 2023-05-11 DIAGNOSIS — F41.0 PANIC DISORDER: ICD-10-CM

## 2023-05-11 RX ORDER — CLONAZEPAM 0.5 MG/1
.5-1 TABLET ORAL NIGHTLY PRN
Qty: 60 TABLET | Refills: 0 | Status: SHIPPED | OUTPATIENT
Start: 2023-05-11 | End: 2023-05-18 | Stop reason: SDUPTHER

## 2023-05-18 ENCOUNTER — OFFICE VISIT (OUTPATIENT)
Dept: PSYCHIATRY | Facility: CLINIC | Age: 38
End: 2023-05-18
Payer: COMMERCIAL

## 2023-05-18 VITALS
SYSTOLIC BLOOD PRESSURE: 118 MMHG | DIASTOLIC BLOOD PRESSURE: 72 MMHG | HEART RATE: 97 BPM | WEIGHT: 144.06 LBS | BODY MASS INDEX: 24.73 KG/M2

## 2023-05-18 DIAGNOSIS — F51.05 INSOMNIA DUE TO MENTAL DISORDER: ICD-10-CM

## 2023-05-18 DIAGNOSIS — F51.5 NIGHTMARES: ICD-10-CM

## 2023-05-18 DIAGNOSIS — F41.0 GENERALIZED ANXIETY DISORDER WITH PANIC ATTACKS: Primary | ICD-10-CM

## 2023-05-18 DIAGNOSIS — F41.1 GENERALIZED ANXIETY DISORDER WITH PANIC ATTACKS: Primary | ICD-10-CM

## 2023-05-18 DIAGNOSIS — F41.0 PANIC DISORDER: ICD-10-CM

## 2023-05-18 PROCEDURE — 99213 OFFICE O/P EST LOW 20 MIN: CPT | Mod: PBBFAC | Performed by: INTERNAL MEDICINE

## 2023-05-18 PROCEDURE — 99214 PR OFFICE/OUTPT VISIT, EST, LEVL IV, 30-39 MIN: ICD-10-PCS | Mod: S$GLB,,, | Performed by: INTERNAL MEDICINE

## 2023-05-18 PROCEDURE — 99214 OFFICE O/P EST MOD 30 MIN: CPT | Mod: S$GLB,,, | Performed by: INTERNAL MEDICINE

## 2023-05-18 PROCEDURE — 99999 PR PBB SHADOW E&M-EST. PATIENT-LVL III: CPT | Mod: PBBFAC,,, | Performed by: INTERNAL MEDICINE

## 2023-05-18 PROCEDURE — 99999 PR PBB SHADOW E&M-EST. PATIENT-LVL III: ICD-10-PCS | Mod: PBBFAC,,, | Performed by: INTERNAL MEDICINE

## 2023-05-18 RX ORDER — ESCITALOPRAM OXALATE 20 MG/1
20 TABLET ORAL DAILY
Qty: 30 TABLET | Refills: 5 | Status: SHIPPED | OUTPATIENT
Start: 2023-05-18 | End: 2024-03-18 | Stop reason: SDUPTHER

## 2023-05-18 RX ORDER — CLONAZEPAM 0.5 MG/1
.5-1 TABLET ORAL NIGHTLY PRN
Qty: 60 TABLET | Refills: 2 | Status: SHIPPED | OUTPATIENT
Start: 2023-05-18 | End: 2024-03-18 | Stop reason: SDUPTHER

## 2023-05-18 RX ORDER — PRAZOSIN HYDROCHLORIDE 1 MG/1
1 CAPSULE ORAL NIGHTLY
Qty: 30 CAPSULE | Refills: 5 | Status: SHIPPED | OUTPATIENT
Start: 2023-05-18 | End: 2023-09-29

## 2023-05-18 NOTE — PROGRESS NOTES
OUTPATIENT PSYCHIATRY RETURN VISIT    ENCOUNTER DATE:  5/18/23   SITE:  [facility], [facility]  LENGTH OF SESSION:  25 minutes    CHIEF COMPLAINT:  Mood and Insomnia      HISTORY OF PRESENTING ILLNESS:  Sarah Lee is a 37 y.o. female with history of Anxiety, Depression, and Insomnia who presents for follow up appointment.     Plan at last appointment on 4/3/2023:  Increase Lexapro to 20mg daily.  Increase Klonopin to 0.75mg qHS and then if this is not helpful can increase to 1mg qHS.  Educated patient that we want medication to calm her enough to fall asleep but not make her too sleepy so that she can't get up with baby.  She agrees to increase medication very slowly.  She was on 1mg dose postpartum after second child and believes this worked well for her.  Discussed with patient informed consent, risks versus benefits, alternative treatments, side effect profile and the inherent unpredictability of individual responses to these treatments.  The patient expresses understanding of the above and displays the capacity to agree with this current plan.  She plans to call back Klraissa Christopher LCSW, to schedule therapy appointment.    History as told by patient:  Ran out of medication because [facility] would not fill her medication - lost her insurance on short term disability and now is using Medicaid for medications and [facility] does not take Medicaid.  Says that Lexapro was helping her feel calmer.  Feels more agitated, angry, and upset off of the medication.  Depression is also worse off of Lexapro.  Can't sleep even when baby sleeps - reports worrying, negative thoughts.  Was sleeping well on Klonopin 0.75mg but was still alert enough to get up with the baby.  But has been having nightmares - this is both on and off of medication.  Had this previously postpartum.  Says she was finally starting to feel like herself on the current medications and then lost it.  She has been off for the last 2 weeks.   She denies SI, HI, or AVH.    Treated with Lexapro 20mg daily, Lamictal 100mg qHS, Prazosin 2mg qHS, and Klonopin 1mg qHS (dose as high as 0.5mg/1mg at one point) for postpartum depression after 2nd child.    Medication side effects:  Denies  Medication compliance:  Yes    PSYCHIATRIC REVIEW OF SYSTEMS:  Trouble with sleep:  Yes as above  Appetite changes:  Decreased  Weight changes:  Not discussed  Lack of energy:  Yes  Anhedonia:  Yes  Somatic symptoms:  Headaches   Libido:  Not discussed  Anxiety/panic:  Yes  Guilty/hopeless:  Sometimes  Self-injurious behavior/risky behavior:  Denies  Any drugs:  Denies  Alcohol:  Denies  Breastfeeding:  Denies    MEDICAL REVIEW OF SYSTEMS:  Complete review of systems performed covering Constitutional, Musculoskeletal, Neurologic.  All systems negative except for that covered in HPI.    PAST PSYCHIATRIC, MEDICAL, AND SOCIAL HISTORY REVIEWED  The patient's past medical, family and social history have been reviewed and updated as appropriate within the electronic medical record - see encounter notes.    MEDICATIONS:    Current Outpatient Medications:     aspirin 81 MG Chew, Take 1 tablet (81 mg total) by mouth once daily., Disp: 60 tablet, Rfl: 4    clonazePAM (KLONOPIN) 0.5 MG tablet, Take 1-2 tablets (0.5-1 mg total) by mouth nightly as needed (Insomnia)., Disp: 60 tablet, Rfl: 2    docusate sodium (COLACE) 100 MG capsule, Take 2 capsules (200 mg total) by mouth 2 (two) times daily as needed for Constipation., Disp: 60 capsule, Rfl: 0    EScitalopram oxalate (LEXAPRO) 20 MG tablet, Take 1 tablet (20 mg total) by mouth once daily., Disp: 30 tablet, Rfl: 5    levonorgestrel-ethinyl estradiol (AVIANE,ALESSE,LESSINA) 0.1-20 mg-mcg per tablet, Take 1 tablet by mouth once daily., Disp: 28 tablet, Rfl: 12    omeprazole (PRILOSEC) 40 MG capsule, Take 1 capsule (40 mg total) by mouth 2 (two) times daily before meals., Disp: 60 capsule, Rfl: 2    prazosin (MINIPRESS) 1 MG Cap, Take 1  "capsule (1 mg total) by mouth every evening., Disp: 30 capsule, Rfl: 5    prenatal 105-iron-folic ac-dha 30 mg iron- 1.4 mg-300 mg Cmpk, Take by mouth., Disp: , Rfl:     ALLERGIES:  Review of patient's allergies indicates:  No Known Allergies    PSYCHIATRIC EXAM:  Vitals:    05/18/23 1516   BP: 118/72   Pulse: 97   Weight: 65.4 kg (144 lb 1.1 oz)     Appearance:  Well groomed, appearing healthy and of stated age  Behavior:  Cooperative, pleasant, no psychomotor agitation or retardation  Speech:  Normal rate, rhythm, prosody, and volume  Mood:  "Not good"  Affect:  Congruent  Thought Process:  Linear, logical, goal directed  Thought Content:  Negative for suicidal ideation, homicidal ideation, delusions or hallucinations.  Associations:  Intact  Memory:  Grossly Intact  Level of Consciousness/Orientation:  Grossly intact  Fund of Knowledge:  Good  Attention:  Good  Language:  Fluent, able to name abstract and concrete objects  Insight:  Fair  Judgment:  Intact  Psychomotor signs:  No involuntary movements or tremor  Gait:  Normal      RELEVANT LABS/STUDIES:    Lab Results   Component Value Date    WBC 15.04 (H) 11/24/2022    HGB 11.9 (L) 11/24/2022    HCT 34.9 (L) 11/24/2022    MCV 82 11/24/2022     11/24/2022     BMP  Lab Results   Component Value Date     11/23/2022    K 4.2 11/23/2022     11/23/2022    CO2 21 (L) 11/23/2022    BUN 8 11/23/2022    CREATININE 0.8 11/23/2022    CALCIUM 9.0 11/23/2022    ANIONGAP 8 11/23/2022    ESTGFRAFRICA >60.0 12/13/2021    EGFRNONAA >60.0 12/13/2021     Lab Results   Component Value Date    ALT 13 11/23/2022    AST 18 11/23/2022    ALKPHOS 135 11/23/2022    BILITOT 0.7 11/23/2022     Lab Results   Component Value Date    TSH 0.650 12/13/2021     Lab Results   Component Value Date    HGBA1C 4.9 12/13/2021       IMPRESSION:    Sarah Lee is a 37 y.o. female with history of Anxiety, Depression, and Insomnia who presents for follow up appointment.  "     Status/Progress:  Based on the examination today, the patient's problem(s) is/are inadequately controlled.  New problems have not been presented today.     Risk Parameters:  Patient reports no suicidal ideation  Patient reports no homicidal ideation  Patient reports no self-injurious behavior  Patient reports no violent behavior    DIAGNOSES:    ICD-10-CM ICD-9-CM   1. Generalized anxiety disorder with panic attacks  F41.1 300.02    F41.0 300.01   2. Panic disorder  F41.0 300.01   3. Postpartum depression  F53.0 648.44     311   4. Insomnia due to mental disorder  F51.05 300.9     327.02   5. Nightmares  F51.5 307.47       PLAN:  Restart Lexapro 20mg daily and Klonopin to 0.75mg qHS since mood and sleep were improving on these medications.  She plans to take Lexapro 10mg daily for 5-7 days prior to increasing back to 20mg daily.    Start Prazosin 1mg qHS for nightmares.  She was on dose of 2mg during her last episode of postpartum depression and it was helpful.    Educated patient that we want medication to calm her enough to fall asleep but not make her too sleepy so that she can't get up with baby.  She agrees to increase medication very slowly.  She was on 1mg dose postpartum after second child and believes this worked well for her.  Discussed with patient informed consent, risks versus benefits, alternative treatments, side effect profile and the inherent unpredictability of individual responses to these treatments.  The patient expresses understanding of the above and displays the capacity to agree with this current plan.    RETURN TO CLINIC:  Follow up in about 2 weeks (around 6/1/2023).

## 2023-06-01 ENCOUNTER — OFFICE VISIT (OUTPATIENT)
Dept: PSYCHIATRY | Facility: CLINIC | Age: 38
End: 2023-06-01
Payer: COMMERCIAL

## 2023-06-01 DIAGNOSIS — F41.1 GENERALIZED ANXIETY DISORDER WITH PANIC ATTACKS: ICD-10-CM

## 2023-06-01 DIAGNOSIS — F41.0 PANIC DISORDER: ICD-10-CM

## 2023-06-01 DIAGNOSIS — F51.05 INSOMNIA DUE TO MENTAL DISORDER: ICD-10-CM

## 2023-06-01 DIAGNOSIS — F41.0 GENERALIZED ANXIETY DISORDER WITH PANIC ATTACKS: ICD-10-CM

## 2023-06-01 PROCEDURE — 99214 PR OFFICE/OUTPT VISIT, EST, LEVL IV, 30-39 MIN: ICD-10-PCS | Mod: 95,,, | Performed by: INTERNAL MEDICINE

## 2023-06-01 PROCEDURE — 99214 OFFICE O/P EST MOD 30 MIN: CPT | Mod: 95,,, | Performed by: INTERNAL MEDICINE

## 2023-06-01 NOTE — PROGRESS NOTES
OUTPATIENT PSYCHIATRY RETURN VISIT    ENCOUNTER DATE:  6/1/23   SITE:  [facility], [facility]  LENGTH OF SESSION:  15 minutes    The patient location is:  [place], not in a healthcare facility  Visit type:  audiovisual    Face to Face time with patient:  15 minutes  20 minutes of total time spent on the encounter, which includes face to face time and non-face to face time preparing to see the patient (eg, review of tests), Obtaining and/or reviewing separately obtained history, Documenting clinical information in the electronic or other health record, Independently interpreting results (not separately reported) and communicating results to the patient/family/caregiver, or Care coordination (not separately reported).     Each patient to whom he or she provides medical services by telemedicine is:  (1) informed of the relationship between the physician and patient and the respective role of any other health care provider with respect to management of the patient; and (2) notified that he or she may decline to receive medical services by telemedicine and may withdraw from such care at any time.    CHIEF COMPLAINT:  Depression      HISTORY OF PRESENTING ILLNESS:  Sarah Lee is a 37 y.o. female with history of Anxiety, Depression, and Insomnia who presents for follow up appointment.     Plan at last appointment on 5/18/2023:  Restart Lexapro 20mg daily and Klonopin to 0.75mg qHS since mood and sleep were improving on these medications.  She plans to take Lexapro 10mg daily for 5-7 days prior to increasing back to 20mg daily.    Start Prazosin 1mg qHS for nightmares.  She was on dose of 2mg during her last episode of postpartum depression and it was helpful.    Educated patient that we want medication to calm her enough to fall asleep but not make her too sleepy so that she can't get up with baby.  She agrees to increase medication very slowly.  She was on 1mg dose postpartum after second child  and believes this worked well for her.  Discussed with patient informed consent, risks versus benefits, alternative treatments, side effect profile and the inherent unpredictability of individual responses to these treatments.  The patient expresses understanding of the above and displays the capacity to agree with this current plan.    History as told by patient:  Doing ok but not great.  Better than she was off of medication though.  Son failed the 4th grade and she feels its her fault.  Did reach out to a coworker who came over and stayed for an hour.  She hasn't been eating much - feels worried.  When reviewing medications with her, she has been taking Lexapro 10mg and Prazosin 1mg qHS at night.  She has been taking Klonopin 0.5mg in the morning.  She realizes she was confused and says she has been feeling more tired during the day.  Denies SI.  Feels she is caring for kids well.  Taking care of herself.      Treated with Lexapro 20mg daily, Lamictal 100mg qHS, Prazosin 2mg qHS, and Klonopin 1mg qHS (dose as high as 0.5mg/1mg at one point) for postpartum depression after 2nd child.    Medication side effects:  Denies  Medication compliance:  Yes    PSYCHIATRIC REVIEW OF SYSTEMS:  Trouble with sleep:  Yes   Appetite changes:  Decreased  Weight changes:  Not discussed  Lack of energy:  Yes  Anhedonia:  Yes  Somatic symptoms:  Headaches   Libido:  Not discussed  Anxiety/panic:  Sometimes  Guilty/hopeless:  Sometimes  Self-injurious behavior/risky behavior:  Denies  Any drugs:  Denies  Alcohol:  Denies  Breastfeeding:  Denies    MEDICAL REVIEW OF SYSTEMS:  Complete review of systems performed covering Constitutional, Musculoskeletal, Neurologic.  All systems negative except for that covered in HPI.    PAST PSYCHIATRIC, MEDICAL, AND SOCIAL HISTORY REVIEWED  The patient's past medical, family and social history have been reviewed and updated as appropriate within the electronic medical record - see encounter  "notes.    MEDICATIONS:    Current Outpatient Medications:     aspirin 81 MG Chew, Take 1 tablet (81 mg total) by mouth once daily., Disp: 60 tablet, Rfl: 4    clonazePAM (KLONOPIN) 0.5 MG tablet, Take 1-2 tablets (0.5-1 mg total) by mouth nightly as needed (Insomnia)., Disp: 60 tablet, Rfl: 2    docusate sodium (COLACE) 100 MG capsule, Take 2 capsules (200 mg total) by mouth 2 (two) times daily as needed for Constipation., Disp: 60 capsule, Rfl: 0    EScitalopram oxalate (LEXAPRO) 20 MG tablet, Take 1 tablet (20 mg total) by mouth once daily., Disp: 30 tablet, Rfl: 5    levonorgestrel-ethinyl estradiol (AVIANE,ALESSE,LESSINA) 0.1-20 mg-mcg per tablet, Take 1 tablet by mouth once daily., Disp: 28 tablet, Rfl: 12    omeprazole (PRILOSEC) 40 MG capsule, Take 1 capsule (40 mg total) by mouth 2 (two) times daily before meals., Disp: 60 capsule, Rfl: 2    prazosin (MINIPRESS) 1 MG Cap, Take 1 capsule (1 mg total) by mouth every evening., Disp: 30 capsule, Rfl: 5    prenatal 105-iron-folic ac-dha 30 mg iron- 1.4 mg-300 mg Cmpk, Take by mouth., Disp: , Rfl:     ALLERGIES:  Review of patient's allergies indicates:  No Known Allergies    PSYCHIATRIC EXAM:  There were no vitals filed for this visit.    Appearance:  Well groomed, appearing healthy and of stated age  Behavior:  Cooperative, pleasant, no psychomotor agitation or retardation  Speech:  Normal rate, rhythm, prosody, and volume  Mood:  "Not great"  Affect:  Congruent  Thought Process:  Linear, logical, goal directed  Thought Content:  Negative for suicidal ideation, homicidal ideation, delusions or hallucinations.  Associations:  Intact  Memory:  Grossly Intact  Level of Consciousness/Orientation:  Grossly intact  Fund of Knowledge:  Good  Attention:  Good  Language:  Fluent, able to name abstract and concrete objects  Insight:  Fair  Judgment:  Intact  Psychomotor signs:  No involuntary movements of face   Gait:  Unable to assess via virtual visit      RELEVANT " LABS/STUDIES:    Lab Results   Component Value Date    WBC 15.04 (H) 11/24/2022    HGB 11.9 (L) 11/24/2022    HCT 34.9 (L) 11/24/2022    MCV 82 11/24/2022     11/24/2022     BMP  Lab Results   Component Value Date     11/23/2022    K 4.2 11/23/2022     11/23/2022    CO2 21 (L) 11/23/2022    BUN 8 11/23/2022    CREATININE 0.8 11/23/2022    CALCIUM 9.0 11/23/2022    ANIONGAP 8 11/23/2022    ESTGFRAFRICA >60.0 12/13/2021    EGFRNONAA >60.0 12/13/2021     Lab Results   Component Value Date    ALT 13 11/23/2022    AST 18 11/23/2022    ALKPHOS 135 11/23/2022    BILITOT 0.7 11/23/2022     Lab Results   Component Value Date    TSH 0.650 12/13/2021     Lab Results   Component Value Date    HGBA1C 4.9 12/13/2021       IMPRESSION:    Sarah Lee is a 37 y.o. female with history of Anxiety, Depression, and Insomnia who presents for follow up appointment.      Status/Progress:  Based on the examination today, the patient's problem(s) is/are inadequately controlled.  New problems have not been presented today.     Risk Parameters:  Patient reports no suicidal ideation  Patient reports no homicidal ideation  Patient reports no self-injurious behavior  Patient reports no violent behavior    DIAGNOSES:    ICD-10-CM ICD-9-CM   1. Postpartum depression  F53.0 648.44     311   2. Generalized anxiety disorder with panic attacks  F41.1 300.02    F41.0 300.01   3. Panic disorder  F41.0 300.01   4. Insomnia due to mental disorder  F51.05 300.9     327.02       PLAN:  Increase Lexapro back to 20mg daily starting today.    Change Klonopin back to night and increase to 0.75mg since previous dose was effective.    Continue Prazosin 1mg qHS for nightmares.  Low threshold to increase to 2mg qHS (previous dose).  Discussed with patient informed consent, risks versus benefits, alternative treatments, side effect profile and the inherent unpredictability of individual responses to these treatments.  The patient expresses  understanding of the above and displays the capacity to agree with this current plan.    RETURN TO CLINIC:  Follow up in about 4 weeks (around 6/29/2023).

## 2023-06-16 NOTE — ED NOTES
Subjective:   General surgery reconsulted after path results back, revealing adenocarcinoma. No acute issues currently.     Objective:   Visit Vitals  /76   Pulse 72   Temp 98.6 °F (37 °C) (Oral)   Resp 16   Ht 5' 6\" (1.676 m)   Wt 72.3 kg (159 lb 6.3 oz)   SpO2 96%   BMI 25.73 kg/m²       Alert and oriented, no acute distress  Abdomen soft, nondistended, nontender. Midline ex lap scar.     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 Workup thus far has included MRCP with evidence of dilated common bile duct with concern for distal biliary stricture. ERCP was attempted on 6/12 but unsuccessful due to anatomy, EUS revealing high grade biliary stricture concerning for malignancy; FNA done, also with perihepatic ascites. Repeat ERCP 6/13 was performed, with brushings and stent placement.   LFTs downtrending.  Brushings from 6/13 and FNA from 6/12 positive for adenoCA.  Ca19-9 pending.  MRCP with and without contrast ordered to stage lesion. Once MRI is done, she can go home and followup in clinic next week for further surgical discussion.   Oncology consulted, appreciate recs.     MADELINE Holliday with Enmanuel Damon MD R2  Seen and examined with Dr. Nice.            Bedside report received from KAYLEY Sebastian. Pt resting comfortably on stretcher, respirations even and unlabored. Pt updated on plan of care. Sitter at bedside. Will continue to monitor.

## 2023-06-30 ENCOUNTER — PATIENT MESSAGE (OUTPATIENT)
Dept: PSYCHIATRY | Facility: CLINIC | Age: 38
End: 2023-06-30
Payer: COMMERCIAL

## 2023-07-20 ENCOUNTER — OFFICE VISIT (OUTPATIENT)
Dept: PSYCHIATRY | Facility: CLINIC | Age: 38
End: 2023-07-20
Payer: COMMERCIAL

## 2023-07-20 ENCOUNTER — PATIENT MESSAGE (OUTPATIENT)
Dept: PSYCHIATRY | Facility: CLINIC | Age: 38
End: 2023-07-20

## 2023-07-20 DIAGNOSIS — F41.0 PANIC DISORDER: ICD-10-CM

## 2023-07-20 DIAGNOSIS — F41.0 GENERALIZED ANXIETY DISORDER WITH PANIC ATTACKS: ICD-10-CM

## 2023-07-20 DIAGNOSIS — F51.05 INSOMNIA DUE TO MENTAL DISORDER: ICD-10-CM

## 2023-07-20 DIAGNOSIS — F41.1 GENERALIZED ANXIETY DISORDER WITH PANIC ATTACKS: ICD-10-CM

## 2023-07-20 PROCEDURE — 99214 PR OFFICE/OUTPT VISIT, EST, LEVL IV, 30-39 MIN: ICD-10-PCS | Mod: 95,,, | Performed by: INTERNAL MEDICINE

## 2023-07-20 PROCEDURE — 99214 OFFICE O/P EST MOD 30 MIN: CPT | Mod: 95,,, | Performed by: INTERNAL MEDICINE

## 2023-07-20 RX ORDER — BUPROPION HYDROCHLORIDE 150 MG/1
150 TABLET ORAL EVERY MORNING
Qty: 30 TABLET | Refills: 2 | Status: SHIPPED | OUTPATIENT
Start: 2023-07-20 | End: 2024-03-18 | Stop reason: SDUPTHER

## 2023-07-20 NOTE — PROGRESS NOTES
OUTPATIENT PSYCHIATRY RETURN VISIT    ENCOUNTER DATE:  7/20/23   SITE:  [facility], [facility]  LENGTH OF SESSION:  15 minutes    The patient location is:  [place], not in a healthcare facility  Visit type:  audiovisual    Face to Face time with patient:  15 minutes  20 minutes of total time spent on the encounter, which includes face to face time and non-face to face time preparing to see the patient (eg, review of tests), Obtaining and/or reviewing separately obtained history, Documenting clinical information in the electronic or other health record, Independently interpreting results (not separately reported) and communicating results to the patient/family/caregiver, or Care coordination (not separately reported).     Each patient to whom he or she provides medical services by telemedicine is:  (1) informed of the relationship between the physician and patient and the respective role of any other health care provider with respect to management of the patient; and (2) notified that he or she may decline to receive medical services by telemedicine and may withdraw from such care at any time.    CHIEF COMPLAINT:  Depression and Anxiety      HISTORY OF PRESENTING ILLNESS:  Sarah Lee is a 38 y.o. female with history of Anxiety, Depression, and Insomnia who presents for follow up appointment.     Plan at last appointment on 6/1/2023:  Increase Lexapro back to 20mg daily starting today.    Change Klonopin back to night and increase to 0.75mg since previous dose was effective.    Continue Prazosin 1mg qHS for nightmares.  Low threshold to increase to 2mg qHS (previous dose).  Discussed with patient informed consent, risks versus benefits, alternative treatments, side effect profile and the inherent unpredictability of individual responses to these treatments.  The patient expresses understanding of the above and displays the capacity to agree with this current plan.    History as told by  "patient:  Thought Prazosin was causing chest pains so stopped a few weeks ago.  Taking Lexapro 20mg daily and Klonopin 0.75mg qHS.  Son went to summer camp and started hanging out with older kids - influenced him to do bad things - got kicked out of camp for this.  Told mother he doesn't get attention from her.  Has had thoughts of not being here anymore but says she would never hurt herself or end her life.  Would just like a break from her responsibilities.  She is exhausted.  Worries she is not a good mother.  Family came over telling her she is crazy that she is relying on a doctor to "change her mind" when she should be able to control her mind herself.  This made her very upset.  She is not sleeping through the night.  Mind can't stop thinking.  Feels like she is a bad person, hard on herself.  Feels angry at times about things that happened to her.  Worries she will never get better.  Has very low energy.  Puts baby down 8pm, falls asleep 11pm, wakes up around 2:30am and then can't go to sleep.  Sometimes sees a shadow at night - looks and nothing is there.  Otherwise has never had paranoia, delusions, or AVH.  Denies SI or HI.  Has extreme fatigue, low motivation, can't get out of bed at times.    Treated with Lexapro 20mg daily, Lamictal 100mg qHS, Prazosin 2mg qHS, and Klonopin 1mg qHS (dose as high as 0.5mg/1mg at one point) for postpartum depression after 2nd child.    Previous Medication Trials: Zoloft (not helpful), Lexapro (helped but took awhile), Trazodone (did not tolerate), Klonopin (helpful)    Medication side effects:  Denies  Medication compliance:  Yes    PSYCHIATRIC REVIEW OF SYSTEMS:  Trouble with sleep:  Yes   Appetite changes:  Decreased  Weight changes:  Not discussed  Lack of energy:  Yes  Anhedonia:  Yes  Somatic symptoms:  Headaches   Libido:  Not discussed  Anxiety/panic:  Sometimes  Guilty/hopeless:  Sometimes  Self-injurious behavior/risky behavior:  Denies  Any drugs:  " Denies  Alcohol:  Denies  Breastfeeding:  Denies    MEDICAL REVIEW OF SYSTEMS:  Complete review of systems performed covering Constitutional, Musculoskeletal, Neurologic.  All systems negative except for that covered in HPI.    PAST PSYCHIATRIC, MEDICAL, AND SOCIAL HISTORY REVIEWED  The patient's past medical, family and social history have been reviewed and updated as appropriate within the electronic medical record - see encounter notes.    MEDICATIONS:    Current Outpatient Medications:     aspirin 81 MG Chew, Take 1 tablet (81 mg total) by mouth once daily., Disp: 60 tablet, Rfl: 4    buPROPion (WELLBUTRIN XL) 150 MG TB24 tablet, Take 1 tablet (150 mg total) by mouth every morning., Disp: 30 tablet, Rfl: 2    clonazePAM (KLONOPIN) 0.5 MG tablet, Take 1-2 tablets (0.5-1 mg total) by mouth nightly as needed (Insomnia)., Disp: 60 tablet, Rfl: 2    docusate sodium (COLACE) 100 MG capsule, Take 2 capsules (200 mg total) by mouth 2 (two) times daily as needed for Constipation., Disp: 60 capsule, Rfl: 0    EScitalopram oxalate (LEXAPRO) 20 MG tablet, Take 1 tablet (20 mg total) by mouth once daily., Disp: 30 tablet, Rfl: 5    levonorgestrel-ethinyl estradiol (AVIANE,ALESSE,LESSINA) 0.1-20 mg-mcg per tablet, Take 1 tablet by mouth once daily., Disp: 28 tablet, Rfl: 12    omeprazole (PRILOSEC) 40 MG capsule, Take 1 capsule (40 mg total) by mouth 2 (two) times daily before meals., Disp: 60 capsule, Rfl: 2    prazosin (MINIPRESS) 1 MG Cap, Take 1 capsule (1 mg total) by mouth every evening., Disp: 30 capsule, Rfl: 5    prenatal 105-iron-folic ac-dha 30 mg iron- 1.4 mg-300 mg Cmpk, Take by mouth., Disp: , Rfl:     ALLERGIES:  Review of patient's allergies indicates:  No Known Allergies    PSYCHIATRIC EXAM:  There were no vitals filed for this visit.    Appearance:  Well groomed, appearing healthy and of stated age  Behavior:  Cooperative, pleasant, no psychomotor agitation or retardation  Speech:  Normal rate, rhythm,  "prosody, and volume  Mood:  "Not great"  Affect:  Congruent  Thought Process:  Linear, logical, goal directed  Thought Content:  Negative for suicidal ideation, homicidal ideation, delusions or hallucinations.  Associations:  Intact  Memory:  Grossly Intact  Level of Consciousness/Orientation:  Grossly intact  Fund of Knowledge:  Good  Attention:  Good  Language:  Fluent, able to name abstract and concrete objects  Insight:  Fair  Judgment:  Intact  Psychomotor signs:  No involuntary movements of face   Gait:  Unable to assess via virtual visit      RELEVANT LABS/STUDIES:    Lab Results   Component Value Date    WBC 15.04 (H) 11/24/2022    HGB 11.9 (L) 11/24/2022    HCT 34.9 (L) 11/24/2022    MCV 82 11/24/2022     11/24/2022     BMP  Lab Results   Component Value Date     11/23/2022    K 4.2 11/23/2022     11/23/2022    CO2 21 (L) 11/23/2022    BUN 8 11/23/2022    CREATININE 0.8 11/23/2022    CALCIUM 9.0 11/23/2022    ANIONGAP 8 11/23/2022    ESTGFRAFRICA >60.0 12/13/2021    EGFRNONAA >60.0 12/13/2021     Lab Results   Component Value Date    ALT 13 11/23/2022    AST 18 11/23/2022    ALKPHOS 135 11/23/2022    BILITOT 0.7 11/23/2022     Lab Results   Component Value Date    TSH 0.650 12/13/2021     Lab Results   Component Value Date    HGBA1C 4.9 12/13/2021       IMPRESSION:    Sarah Lee is a 38 y.o. female with history of Anxiety, Depression, and Insomnia who presents for follow up appointment.      Status/Progress:  Based on the examination today, the patient's problem(s) is/are inadequately controlled.  New problems have not been presented today.     Risk Parameters:  Patient reports no suicidal ideation  Patient reports no homicidal ideation  Patient reports no self-injurious behavior  Patient reports no violent behavior    DIAGNOSES:    ICD-10-CM ICD-9-CM   1. Postpartum depression  F53.0 648.44     311   2. Generalized anxiety disorder with panic attacks  F41.1 300.02    F41.0 300.01 "   3. Panic disorder  F41.0 300.01   4. Insomnia due to mental disorder  F51.05 300.9     327.02         PLAN:  Discussed options of 1. Increasing Lexapro to 30mg daily, 2. Adding Wellbutrin for depression, 3. Adding Buspar for anxiety, 3. Adding Remeron for sleep and mood, or 4. Restarting Lamictal.  She does not want to restart Lamictal and is not interested in Remeron due to potential for increased appetite.  She would first like to try adding Wellbutrin since she feels her depression is the most severe.  Will start Wellbutrin XL 150mg daily.  She is no longer breastfeeding.   Increase Klonopin to 1mg qHS.  She reports she is not too sleepy to get up with children in the night at this dose.    She is no longer taking Prazosin due to side effects.    Discussed with patient informed consent, risks versus benefits, alternative treatments, side effect profile and the inherent unpredictability of individual responses to these treatments.  The patient expresses understanding of the above and displays the capacity to agree with this current plan.    RETURN TO CLINIC:  Follow up in about 2 weeks (around 8/3/2023).

## 2023-07-25 ENCOUNTER — TELEPHONE (OUTPATIENT)
Dept: PSYCHIATRY | Facility: CLINIC | Age: 38
End: 2023-07-25
Payer: COMMERCIAL

## 2023-07-25 NOTE — TELEPHONE ENCOUNTER
Attempted to call pt, no answer. Lvm requesting call back to schedule. Office number provided, office hours also provided.

## 2023-07-25 NOTE — TELEPHONE ENCOUNTER
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   ----- Message -----  From: Sarah Espinoza MD  Sent: 2023   2:32 PM CDT  To: Cassandra Rockweiler, LCSW  Subject: Postpartum new patient                           Could y'all reach out to this patient again to schedule with you?  She has severe postpartum depresison and anxiety.  She tried to call you back twice but has trouble getting in touch with someone.  Thanks!

## 2023-09-29 ENCOUNTER — OFFICE VISIT (OUTPATIENT)
Dept: PSYCHIATRY | Facility: CLINIC | Age: 38
End: 2023-09-29
Payer: MEDICAID

## 2023-09-29 DIAGNOSIS — F41.0 GENERALIZED ANXIETY DISORDER WITH PANIC ATTACKS: Primary | ICD-10-CM

## 2023-09-29 DIAGNOSIS — F41.1 GENERALIZED ANXIETY DISORDER WITH PANIC ATTACKS: Primary | ICD-10-CM

## 2023-09-29 DIAGNOSIS — F41.0 PANIC DISORDER: ICD-10-CM

## 2023-09-29 DIAGNOSIS — F51.05 INSOMNIA DUE TO MENTAL DISORDER: ICD-10-CM

## 2023-09-29 PROCEDURE — 99214 PR OFFICE/OUTPT VISIT, EST, LEVL IV, 30-39 MIN: ICD-10-PCS | Mod: AF,HB,95, | Performed by: INTERNAL MEDICINE

## 2023-09-29 PROCEDURE — 99214 OFFICE O/P EST MOD 30 MIN: CPT | Mod: AF,HB,95, | Performed by: INTERNAL MEDICINE

## 2023-09-29 NOTE — LETTER
September 29, 2023      [facility] - [facility] 4th [address]  [address]  [facility] [address]  Phone: [phone]  Fax: [phone]       Patient: Sarah Lee   YOB: 1985  Date of Visit: 09/29/2023    To Whom It May Concern:    Sarah Lee was at [facility] on 09/29/2023. The patient may return to work on Monday 10/16/23 working only part time (4 hours per day). If you have any questions or concerns, or if I can be of further assistance, please do not hesitate to contact me.    Sincerely,     Sarah Espinoza MD

## 2023-09-29 NOTE — PROGRESS NOTES
OUTPATIENT PSYCHIATRY RETURN VISIT    ENCOUNTER DATE:  9/29/23   SITE:  [facility], [facility]  LENGTH OF SESSION:  15 minutes    The patient location is:  [place], not in a healthcare facility  Visit type:  audiovisual    Face to Face time with patient:  15 minutes  20 minutes of total time spent on the encounter, which includes face to face time and non-face to face time preparing to see the patient (eg, review of tests), Obtaining and/or reviewing separately obtained history, Documenting clinical information in the electronic or other health record, Independently interpreting results (not separately reported) and communicating results to the patient/family/caregiver, or Care coordination (not separately reported).     Each patient to whom he or she provides medical services by telemedicine is:  (1) informed of the relationship between the physician and patient and the respective role of any other health care provider with respect to management of the patient; and (2) notified that he or she may decline to receive medical services by telemedicine and may withdraw from such care at any time.    CHIEF COMPLAINT:  Depression and Anxiety      HISTORY OF PRESENTING ILLNESS:  Sarah Lee is a 38 y.o. female with history of Anxiety, Depression, and Insomnia who presents for follow up appointment.     Plan at last appointment on 7/20/2023:  Discussed options of 1. Increasing Lexapro to 30mg daily, 2. Adding Wellbutrin for depression, 3. Adding Buspar for anxiety, 3. Adding Remeron for sleep and mood, or 4. Restarting Lamictal.  She does not want to restart Lamictal and is not interested in Remeron due to potential for increased appetite.  She would first like to try adding Wellbutrin since she feels her depression is the most severe.  Will start Wellbutrin XL 150mg daily.  She is no longer breastfeeding.   Increase Klonopin to 1mg qHS.  She reports she is not too sleepy to get up with children in  the night at this dose.    She is no longer taking Prazosin due to side effects.    Discussed with patient informed consent, risks versus benefits, alternative treatments, side effect profile and the inherent unpredictability of individual responses to these treatments.  The patient expresses understanding of the above and displays the capacity to agree with this current plan.    History as told by patient:  Says she is doing ok.  Wellbutrin has been helpful and was really working for her.  For awhile was doing really well and back to herself.  But then insurance company told her they weren't getting things from her on time which has been stressful.  They told her she could appeal.  She is wanting to go back to work - part time first.  Falling behind on finances, needs to pay bills.  So mood has been worse since these stressors started to pile up.  On a binding leave with work so says she will get paid as soon as she goes back.  Regular schedule is 5am to 1pm.  She will first go back 4 hours per day.  Denies hopelessness, SI, or HI.    Treated with Lexapro 20mg daily, Lamictal 100mg qHS, Prazosin 2mg qHS, and Klonopin 1mg qHS (dose as high as 0.5mg/1mg at one point) for postpartum depression after 2nd child.    Previous Medication Trials: Zoloft (not helpful), Lexapro (helped but took awhile), Trazodone (did not tolerate), Klonopin (helpful)    Medication side effects:  Denies  Medication compliance:  Yes    PSYCHIATRIC REVIEW OF SYSTEMS:  Trouble with sleep:  Improved  Appetite changes:  Denies  Weight changes:  Not discussed  Lack of energy:  Improved  Anhedonia:  Improved  Somatic symptoms:  Denies  Libido:  Not discussed  Anxiety/panic:  Yes due to stressors as above  Guilty/hopeless:  Sometimes  Self-injurious behavior/risky behavior:  Denies  Any drugs:  Denies  Alcohol:  Denies  Breastfeeding:  Denies    MEDICAL REVIEW OF SYSTEMS:  Complete review of systems performed covering Constitutional, Musculoskeletal,  "Neurologic.  All systems negative except for that covered in HPI.    PAST PSYCHIATRIC, MEDICAL, AND SOCIAL HISTORY REVIEWED  The patient's past medical, family and social history have been reviewed and updated as appropriate within the electronic medical record - see encounter notes.    MEDICATIONS:    Current Outpatient Medications:     aspirin 81 MG Chew, Take 1 tablet (81 mg total) by mouth once daily., Disp: 60 tablet, Rfl: 4    buPROPion (WELLBUTRIN XL) 150 MG TB24 tablet, Take 1 tablet (150 mg total) by mouth every morning., Disp: 30 tablet, Rfl: 2    clonazePAM (KLONOPIN) 0.5 MG tablet, Take 1-2 tablets (0.5-1 mg total) by mouth nightly as needed (Insomnia)., Disp: 60 tablet, Rfl: 2    docusate sodium (COLACE) 100 MG capsule, Take 2 capsules (200 mg total) by mouth 2 (two) times daily as needed for Constipation., Disp: 60 capsule, Rfl: 0    EScitalopram oxalate (LEXAPRO) 20 MG tablet, Take 1 tablet (20 mg total) by mouth once daily., Disp: 30 tablet, Rfl: 5    levonorgestrel-ethinyl estradiol (AVIANE,ALESSE,LESSINA) 0.1-20 mg-mcg per tablet, Take 1 tablet by mouth once daily., Disp: 28 tablet, Rfl: 12    omeprazole (PRILOSEC) 40 MG capsule, Take 1 capsule (40 mg total) by mouth 2 (two) times daily before meals., Disp: 60 capsule, Rfl: 2    prenatal 105-iron-folic ac-dha 30 mg iron- 1.4 mg-300 mg Cmpk, Take by mouth., Disp: , Rfl:     ALLERGIES:  Review of patient's allergies indicates:  No Known Allergies    PSYCHIATRIC EXAM:  There were no vitals filed for this visit.    Appearance:  Well groomed, appearing healthy and of stated age  Behavior:  Cooperative, pleasant, no psychomotor agitation or retardation  Speech:  Normal rate, rhythm, prosody, and volume  Mood:  "Better"  Affect:  Congruent  Thought Process:  Linear, logical, goal directed  Thought Content:  Negative for suicidal ideation, homicidal ideation, delusions or hallucinations.  Associations:  Intact  Memory:  Grossly Intact  Level of " Consciousness/Orientation:  Grossly intact  Fund of Knowledge:  Good  Attention:  Good  Language:  Fluent, able to name abstract and concrete objects  Insight:  Fair  Judgment:  Intact  Psychomotor signs:  No involuntary movements of face   Gait:  Unable to assess via virtual visit      RELEVANT LABS/STUDIES:    Lab Results   Component Value Date    WBC 15.04 (H) 11/24/2022    HGB 11.9 (L) 11/24/2022    HCT 34.9 (L) 11/24/2022    MCV 82 11/24/2022     11/24/2022     BMP  Lab Results   Component Value Date     11/23/2022    K 4.2 11/23/2022     11/23/2022    CO2 21 (L) 11/23/2022    BUN 8 11/23/2022    CREATININE 0.8 11/23/2022    CALCIUM 9.0 11/23/2022    ANIONGAP 8 11/23/2022    ESTGFRAFRICA >60.0 12/13/2021    EGFRNONAA >60.0 12/13/2021     Lab Results   Component Value Date    ALT 13 11/23/2022    AST 18 11/23/2022    ALKPHOS 135 11/23/2022    BILITOT 0.7 11/23/2022     Lab Results   Component Value Date    TSH 0.650 12/13/2021     Lab Results   Component Value Date    HGBA1C 4.9 12/13/2021       IMPRESSION:    Sarah Lee is a 38 y.o. female with history of Anxiety, Depression, and Insomnia who presents for follow up appointment.      Status/Progress:  Based on the examination today, the patient's problem(s) is/are adequately but not ideally controlled.  New problems have not been presented today.    Risk Parameters:  Patient reports no suicidal ideation  Patient reports no homicidal ideation  Patient reports no self-injurious behavior  Patient reports no violent behavior      DIAGNOSES:    ICD-10-CM ICD-9-CM   1. Generalized anxiety disorder with panic attacks  F41.1 300.02    F41.0 300.01   2. Panic disorder  F41.0 300.01   3. Postpartum depression  F53.0 648.44     311   4. Insomnia due to mental disorder  F51.05 300.9     327.02       PLAN:  Improvement in mood on Wellbutrin.  Cleared to return to work part time.  Letter sent to her portal.   Continue Wellbutrin XL 150mg daily,  Lexapro 20mg daily, and Klonopin 1mg qHS.  Low threshold to increase Wellbutrin to 300mg daily if mood does not improve with going back to work.  Discussed with patient informed consent, risks versus benefits, alternative treatments, side effect profile and the inherent unpredictability of individual responses to these treatments.  The patient expresses understanding of the above and displays the capacity to agree with this current plan.    RETURN TO CLINIC:  Follow up in about 2 weeks (around 10/13/2023).

## 2023-10-02 ENCOUNTER — PATIENT MESSAGE (OUTPATIENT)
Dept: PSYCHIATRY | Facility: CLINIC | Age: 38
End: 2023-10-02
Payer: COMMERCIAL

## 2023-10-05 ENCOUNTER — PATIENT MESSAGE (OUTPATIENT)
Dept: PSYCHIATRY | Facility: CLINIC | Age: 38
End: 2023-10-05
Payer: COMMERCIAL

## 2023-10-06 ENCOUNTER — PATIENT MESSAGE (OUTPATIENT)
Dept: PSYCHIATRY | Facility: CLINIC | Age: 38
End: 2023-10-06
Payer: COMMERCIAL

## 2023-12-20 ENCOUNTER — TELEPHONE (OUTPATIENT)
Dept: PSYCHIATRY | Facility: CLINIC | Age: 38
End: 2023-12-20
Payer: COMMERCIAL

## 2023-12-20 NOTE — TELEPHONE ENCOUNTER
Called patient regarding today's virtual appointment.  No answer, left voicemail.  Also sent reminder text through Epic.

## 2024-03-18 ENCOUNTER — PATIENT MESSAGE (OUTPATIENT)
Dept: PSYCHIATRY | Facility: CLINIC | Age: 39
End: 2024-03-18

## 2024-03-18 ENCOUNTER — OFFICE VISIT (OUTPATIENT)
Dept: PSYCHIATRY | Facility: CLINIC | Age: 39
End: 2024-03-18
Payer: COMMERCIAL

## 2024-03-18 DIAGNOSIS — F41.1 GENERALIZED ANXIETY DISORDER WITH PANIC ATTACKS: ICD-10-CM

## 2024-03-18 DIAGNOSIS — F51.05 INSOMNIA DUE TO MENTAL DISORDER: Primary | ICD-10-CM

## 2024-03-18 DIAGNOSIS — F41.0 GENERALIZED ANXIETY DISORDER WITH PANIC ATTACKS: ICD-10-CM

## 2024-03-18 DIAGNOSIS — F41.0 PANIC DISORDER: ICD-10-CM

## 2024-03-18 PROCEDURE — 99214 OFFICE O/P EST MOD 30 MIN: CPT | Mod: 95,,, | Performed by: INTERNAL MEDICINE

## 2024-03-18 RX ORDER — ESCITALOPRAM OXALATE 20 MG/1
20 TABLET ORAL EVERY MORNING
Qty: 30 TABLET | Refills: 5 | Status: SHIPPED | OUTPATIENT
Start: 2024-03-18

## 2024-03-18 RX ORDER — CLONAZEPAM 0.5 MG/1
.5-1 TABLET ORAL NIGHTLY PRN
Qty: 60 TABLET | Refills: 2 | Status: SHIPPED | OUTPATIENT
Start: 2024-03-18 | End: 2025-03-18

## 2024-03-18 RX ORDER — BUPROPION HYDROCHLORIDE 150 MG/1
150 TABLET ORAL EVERY MORNING
Qty: 30 TABLET | Refills: 5 | Status: SHIPPED | OUTPATIENT
Start: 2024-03-18 | End: 2025-03-18

## 2024-03-18 NOTE — PROGRESS NOTES
OUTPATIENT PSYCHIATRY RETURN VISIT    ENCOUNTER DATE:  3/18/24   SITE:  [facility], [facility]  LENGTH OF SESSION:  20 minutes    The patient location is:  [place], not in a healthcare facility  Visit type:  audiovisual    Face to Face time with patient:  20 minutes  25 minutes of total time spent on the encounter, which includes face to face time and non-face to face time preparing to see the patient (eg, review of tests), Obtaining and/or reviewing separately obtained history, Documenting clinical information in the electronic or other health record, Independently interpreting results (not separately reported) and communicating results to the patient/family/caregiver, or Care coordination (not separately reported).     Each patient to whom he or she provides medical services by telemedicine is:  (1) informed of the relationship between the physician and patient and the respective role of any other health care provider with respect to management of the patient; and (2) notified that he or she may decline to receive medical services by telemedicine and may withdraw from such care at any time.    CHIEF COMPLAINT:  Anxiety and Depression      HISTORY OF PRESENTING ILLNESS:  Sarah Lee is a 38 y.o. female with history of Anxiety, Depression, and Insomnia who presents for follow up appointment.     Plan at last appointment on 9/29/2023:  Improvement in mood on Wellbutrin.  Cleared to return to work part time.  Letter sent to her portal.   Continue Wellbutrin XL 150mg daily, Lexapro 20mg daily, and Klonopin 1mg qHS.  Low threshold to increase Wellbutrin to 300mg daily if mood does not improve with going back to work.  Discussed with patient informed consent, risks versus benefits, alternative treatments, side effect profile and the inherent unpredictability of individual responses to these treatments.  The patient expresses understanding of the above and displays the capacity to agree with this  current plan.    History as told by patient:  Says she is back and work and got better after last appointment.  Started feeling depressed recently.  Going through a lot and has thought about quitting her job but needs her job financially.  Stressful at work, wants to continue to work.  Everything gets on her nerves.  Has gotten into it with the members.  Has not been taking her medications either because she was doing better.  Sleep has been ok but not great.  Only sleeping 4 hours.  Work schedule is 5am to 1:30pm.  2 oldest are in school and little one is at  (his aunt actually).  He is 15 months and just started walking.  Used to cry when she dropped him off but now smiling so this is making her happy.  Kids are doing well.  Stopped her medications in January because she was doing so well.  Someone at work, older lady in her department who bullies her a little bit, she reported her of course, this does make her anxiety increase at work.  Both of her cars broke down, bought new car, then the new car's fuel pump wore out.  Kids' dad not as helpful as he used to be.  New car has opening to get fixed this Wednesday.  Having to catch [employer] which is stressful.  Having some panic attacks.  Has a close friend but her mother is in the hospital right now so doesn't want to put her stress on her too.      Medication side effects:  N/A  Medication compliance:  No    PSYCHIATRIC REVIEW OF SYSTEMS:  Trouble with sleep:  Yes  Appetite changes:  Not discussed  Weight changes:  Not discussed  Lack of energy:  Sometimes  Anhedonia:  Sometimes  Somatic symptoms:  Denies  Libido:  Not discussed  Anxiety/panic:  Yes as above  Guilty/hopeless:  Denies  Self-injurious behavior/risky behavior:  Denies  Any drugs:  Denies  Alcohol:  Denies  Breastfeeding:  Denies    MEDICAL REVIEW OF SYSTEMS:  Complete review of systems performed covering Constitutional, Musculoskeletal, Neurologic.  All systems negative except for that covered in  "HPI.    PAST PSYCHIATRIC, MEDICAL, AND SOCIAL HISTORY REVIEWED  The patient's past medical, family and social history have been reviewed and updated as appropriate within the electronic medical record - see encounter notes.    MEDICATIONS:    Current Outpatient Medications:     aspirin 81 MG Chew, Take 1 tablet (81 mg total) by mouth once daily., Disp: 60 tablet, Rfl: 4    buPROPion (WELLBUTRIN XL) 150 MG TB24 tablet, Take 1 tablet (150 mg total) by mouth every morning., Disp: 30 tablet, Rfl: 5    clonazePAM (KLONOPIN) 0.5 MG tablet, Take 1-2 tablets (0.5-1 mg total) by mouth nightly as needed (Insomnia)., Disp: 60 tablet, Rfl: 2    docusate sodium (COLACE) 100 MG capsule, Take 2 capsules (200 mg total) by mouth 2 (two) times daily as needed for Constipation., Disp: 60 capsule, Rfl: 0    EScitalopram oxalate (LEXAPRO) 20 MG tablet, Take 1 tablet (20 mg total) by mouth every morning., Disp: 30 tablet, Rfl: 5    levonorgestrel-ethinyl estradiol (AVIANE,ALESSE,LESSINA) 0.1-20 mg-mcg per tablet, Take 1 tablet by mouth once daily., Disp: 28 tablet, Rfl: 12    omeprazole (PRILOSEC) 40 MG capsule, Take 1 capsule (40 mg total) by mouth 2 (two) times daily before meals., Disp: 60 capsule, Rfl: 2    prenatal 105-iron-folic ac-dha 30 mg iron- 1.4 mg-300 mg Cmpk, Take by mouth., Disp: , Rfl:     ALLERGIES:  Review of patient's allergies indicates:  No Known Allergies    PSYCHIATRIC EXAM:  There were no vitals filed for this visit.    Appearance:  Well groomed, appearing healthy and of stated age  Behavior:  Cooperative, pleasant, no psychomotor agitation or retardation  Speech:  Normal rate, rhythm, prosody, and volume  Mood:  "Depressed, anxious"  Affect:  Congruent  Thought Process:  Linear, logical, goal directed  Thought Content:  Negative for suicidal ideation, homicidal ideation, delusions or hallucinations.  Associations:  Intact  Memory:  Grossly Intact  Level of Consciousness/Orientation:  Grossly intact  Fund of " Knowledge:  Good  Attention:  Good  Language:  Fluent, able to name abstract and concrete objects  Insight:  Fair  Judgment:  Intact  Psychomotor signs:  No involuntary movements of face   Gait:  Unable to assess via virtual visit      RELEVANT LABS/STUDIES:    Lab Results   Component Value Date    WBC 15.04 (H) 11/24/2022    HGB 11.9 (L) 11/24/2022    HCT 34.9 (L) 11/24/2022    MCV 82 11/24/2022     11/24/2022     BMP  Lab Results   Component Value Date     11/23/2022    K 4.2 11/23/2022     11/23/2022    CO2 21 (L) 11/23/2022    BUN 8 11/23/2022    CREATININE 0.8 11/23/2022    CALCIUM 9.0 11/23/2022    ANIONGAP 8 11/23/2022    ESTGFRAFRICA >60.0 12/13/2021    EGFRNONAA >60.0 12/13/2021     Lab Results   Component Value Date    ALT 13 11/23/2022    AST 18 11/23/2022    ALKPHOS 135 11/23/2022    BILITOT 0.7 11/23/2022     Lab Results   Component Value Date    TSH 0.650 12/13/2021     Lab Results   Component Value Date    HGBA1C 4.9 12/13/2021       IMPRESSION:    Sarah Lee is a 38 y.o. female with history of Anxiety, Depression, and Insomnia who presents for follow up appointment.      Status/Progress:  Based on the examination today, the patient's problem(s) is/are inadequately controlled.  New problems have not been presented today.    Risk Parameters:  Patient reports no suicidal ideation  Patient reports no homicidal ideation  Patient reports no self-injurious behavior  Patient reports no violent behavior      DIAGNOSES:    ICD-10-CM ICD-9-CM   1. Insomnia due to mental disorder  F51.05 300.9     327.02   2. Panic disorder  F41.0 300.01   3. Postpartum depression  F53.0 648.44     311   4. Generalized anxiety disorder with panic attacks  F41.1 300.02    F41.0 300.01       PLAN:  Restart Lexapro 10mg daily x 1 week and then 20mg daily.  Restart Wellbutrin XL 150mg daily  Restart Klonopin 0.5-1mg qHS PRN insomnia.   Discussed with patient informed consent, risks versus benefits,  alternative treatments, side effect profile and the inherent unpredictability of individual responses to these treatments.  The patient expresses understanding of the above and displays the capacity to agree with this current plan.    RETURN TO CLINIC:  Follow up in 7 weeks (on 5/9/2024). In person + Optional check in 3-4 weeks virtually

## 2024-09-24 ENCOUNTER — OFFICE VISIT (OUTPATIENT)
Dept: PSYCHIATRY | Facility: CLINIC | Age: 39
End: 2024-09-24
Payer: COMMERCIAL

## 2024-09-24 DIAGNOSIS — F41.1 GENERALIZED ANXIETY DISORDER WITH PANIC ATTACKS: Primary | ICD-10-CM

## 2024-09-24 DIAGNOSIS — F41.0 PANIC DISORDER: ICD-10-CM

## 2024-09-24 DIAGNOSIS — F51.05 INSOMNIA DUE TO MENTAL DISORDER: ICD-10-CM

## 2024-09-24 DIAGNOSIS — F41.0 GENERALIZED ANXIETY DISORDER WITH PANIC ATTACKS: Primary | ICD-10-CM

## 2024-09-24 PROCEDURE — 99214 OFFICE O/P EST MOD 30 MIN: CPT | Mod: 95,,, | Performed by: INTERNAL MEDICINE

## 2024-09-24 RX ORDER — ESCITALOPRAM OXALATE 20 MG/1
20 TABLET ORAL EVERY MORNING
Qty: 30 TABLET | Refills: 5 | Status: SHIPPED | OUTPATIENT
Start: 2024-09-24

## 2024-09-24 RX ORDER — BUPROPION HYDROCHLORIDE 150 MG/1
150 TABLET ORAL EVERY MORNING
Qty: 30 TABLET | Refills: 5 | Status: SHIPPED | OUTPATIENT
Start: 2024-09-24 | End: 2025-09-24

## 2024-09-24 NOTE — PROGRESS NOTES
OUTPATIENT PSYCHIATRY RETURN VISIT    ENCOUNTER DATE:  9/24/24   SITE:  [facility], [facility]  LENGTH OF SESSION:  10 minutes    The patient location is:  [place], not in a healthcare facility  Visit type:  audiovisual    Face to Face time with patient:  10 minutes  15 minutes of total time spent on the encounter, which includes face to face time and non-face to face time preparing to see the patient (eg, review of tests), Obtaining and/or reviewing separately obtained history, Documenting clinical information in the electronic or other health record, Independently interpreting results (not separately reported) and communicating results to the patient/family/caregiver, or Care coordination (not separately reported).     Each patient to whom he or she provides medical services by telemedicine is:  (1) informed of the relationship between the physician and patient and the respective role of any other health care provider with respect to management of the patient; and (2) notified that he or she may decline to receive medical services by telemedicine and may withdraw from such care at any time.    CHIEF COMPLAINT:  Anxiety      HISTORY OF PRESENTING ILLNESS:  Sarah Lee is a 39 y.o. female with history of Anxiety, Depression, and Insomnia who presents for follow up appointment.     Plan at last appointment on Visit date not found:  Restart Lexapro 10mg daily x 1 week and then 20mg daily.  Restart Wellbutrin XL 150mg daily  Restart Klonopin 0.5-1mg qHS PRN insomnia.   Discussed with patient informed consent, risks versus benefits, alternative treatments, side effect profile and the inherent unpredictability of individual responses to these treatments.  The patient expresses understanding of the above and displays the capacity to agree with this current plan.    History as told by patient:  Has not started back her medication.  She has been overwhelmed with so much stuff.  Helping everyone but  not getting time for herself.  Everyone comes before her it seems like.  Wants to start over and actually take her medication.  Does know the medication helps.  Gets agitated easily.  Everything in her life is stressful.  Main stressor is taking care of all the kids, keeping house clean, just being a single mother.  Cook, homework, baths.  Gets off work at 1:30, goes straight to  youngest.  Goes to sleep 9-10pm, gets up 2:45am, starts work 5am, but has to take baby 30 minutes.  Kids are in bed by 8pm.  But then takes time cleaning up, getting clothes out.  No available help from family.  Their dad is off shore.  He helps financially and that is it.  Does not have hard time fall asleep - just doesn't have time in her day to sleep.      Medication side effects:  N/A  Medication compliance:  No    PSYCHIATRIC REVIEW OF SYSTEMS:  Trouble with sleep:  Yes  Appetite changes:  Not discussed  Weight changes:  Not discussed  Lack of energy:  Sometimes  Anhedonia:  Sometimes  Somatic symptoms:  Denies  Libido:  Not discussed  Anxiety/panic:  Yes as above  Guilty/hopeless:  Denies  Self-injurious behavior/risky behavior:  Denies  Any drugs:  Denies  Alcohol:  Denies  Breastfeeding:  Denies    MEDICAL REVIEW OF SYSTEMS:  Complete review of systems performed covering Constitutional, Musculoskeletal, Neurologic.  All systems negative except for that covered in HPI.    PAST PSYCHIATRIC, MEDICAL, AND SOCIAL HISTORY REVIEWED  The patient's past medical, family and social history have been reviewed and updated as appropriate within the electronic medical record - see encounter notes.    MEDICATIONS:    Current Outpatient Medications:     aspirin 81 MG Chew, Take 1 tablet (81 mg total) by mouth once daily., Disp: 60 tablet, Rfl: 4    buPROPion (WELLBUTRIN XL) 150 MG TB24 tablet, Take 1 tablet (150 mg total) by mouth every morning., Disp: 30 tablet, Rfl: 5    clonazePAM (KLONOPIN) 0.5 MG tablet, Take 1-2 tablets (0.5-1 mg total)  "by mouth nightly as needed (Insomnia)., Disp: 60 tablet, Rfl: 2    docusate sodium (COLACE) 100 MG capsule, Take 2 capsules (200 mg total) by mouth 2 (two) times daily as needed for Constipation., Disp: 60 capsule, Rfl: 0    EScitalopram oxalate (LEXAPRO) 20 MG tablet, Take 1 tablet (20 mg total) by mouth every morning., Disp: 30 tablet, Rfl: 5    levonorgestrel-ethinyl estradiol (AVIANE,ALESSE,LESSINA) 0.1-20 mg-mcg per tablet, Take 1 tablet by mouth once daily., Disp: 28 tablet, Rfl: 12    omeprazole (PRILOSEC) 40 MG capsule, Take 1 capsule (40 mg total) by mouth 2 (two) times daily before meals., Disp: 60 capsule, Rfl: 2    prenatal 105-iron-folic ac-dha 30 mg iron- 1.4 mg-300 mg Cmpk, Take by mouth., Disp: , Rfl:     ALLERGIES:  Review of patient's allergies indicates:  No Known Allergies    PSYCHIATRIC EXAM:  There were no vitals filed for this visit.    Appearance:  Well groomed, appearing healthy and of stated age  Behavior:  Cooperative, pleasant, no psychomotor agitation or retardation  Speech:  Normal rate, rhythm, prosody, and volume  Mood:  "Stressed"  Affect:  Congruent  Thought Process:  Linear, logical, goal directed  Thought Content:  Negative for suicidal ideation, homicidal ideation, delusions or hallucinations.  Associations:  Intact  Memory:  Grossly Intact  Level of Consciousness/Orientation:  Grossly intact  Fund of Knowledge:  Good  Attention:  Good  Language:  Fluent, able to name abstract and concrete objects  Insight:  Fair  Judgment:  Intact  Psychomotor signs:  No involuntary movements of face   Gait:  Unable to assess via virtual visit      RELEVANT LABS/STUDIES:    Lab Results   Component Value Date    WBC 15.04 (H) 11/24/2022    HGB 11.9 (L) 11/24/2022    HCT 34.9 (L) 11/24/2022    MCV 82 11/24/2022     11/24/2022     BMP  Lab Results   Component Value Date     11/23/2022    K 4.2 11/23/2022     11/23/2022    CO2 21 (L) 11/23/2022    BUN 8 11/23/2022    CREATININE " 0.8 11/23/2022    CALCIUM 9.0 11/23/2022    ANIONGAP 8 11/23/2022    ESTGFRAFRICA >60.0 12/13/2021    EGFRNONAA >60.0 12/13/2021     Lab Results   Component Value Date    ALT 13 11/23/2022    AST 18 11/23/2022    ALKPHOS 135 11/23/2022    BILITOT 0.7 11/23/2022     Lab Results   Component Value Date    TSH 0.650 12/13/2021     Lab Results   Component Value Date    HGBA1C 4.9 12/13/2021       IMPRESSION:    Sarah Lee is a 39 y.o. female with history of Anxiety, Depression, and Insomnia who presents for follow up appointment.      Status/Progress:  Based on the examination today, the patient's problem(s) is/are inadequately controlled.  New problems have not been presented today.    Risk Parameters:  Patient reports no suicidal ideation  Patient reports no homicidal ideation  Patient reports no self-injurious behavior  Patient reports no violent behavior      DIAGNOSES:    ICD-10-CM ICD-9-CM   1. Generalized anxiety disorder with panic attacks  F41.1 300.02    F41.0 300.01   2. Panic disorder  F41.0 300.01   3. Postpartum depression  F53.0 648.44     311   4. Insomnia due to mental disorder  F51.05 300.9     327.02         PLAN:  Restart Lexapro 10mg daily x 1 week and then 20mg daily.  Restart Wellbutrin XL 150mg daily  Restart Klonopin 0.5-1mg qHS PRN insomnia.   Discussed with patient informed consent, risks versus benefits, alternative treatments, side effect profile and the inherent unpredictability of individual responses to these treatments.  The patient expresses understanding of the above and displays the capacity to agree with this current plan.    RETURN TO CLINIC:  Follow up in about 6 weeks (around 11/5/2024).

## 2024-10-23 ENCOUNTER — PATIENT MESSAGE (OUTPATIENT)
Dept: PSYCHIATRY | Facility: CLINIC | Age: 39
End: 2024-10-23
Payer: COMMERCIAL

## 2025-02-13 ENCOUNTER — OFFICE VISIT (OUTPATIENT)
Dept: PSYCHIATRY | Facility: CLINIC | Age: 40
End: 2025-02-13
Payer: COMMERCIAL

## 2025-02-13 ENCOUNTER — PATIENT MESSAGE (OUTPATIENT)
Dept: PSYCHIATRY | Facility: CLINIC | Age: 40
End: 2025-02-13
Payer: COMMERCIAL

## 2025-02-13 DIAGNOSIS — F33.1 MDD (MAJOR DEPRESSIVE DISORDER), RECURRENT EPISODE, MODERATE: ICD-10-CM

## 2025-02-13 DIAGNOSIS — F41.0 GENERALIZED ANXIETY DISORDER WITH PANIC ATTACKS: Primary | ICD-10-CM

## 2025-02-13 DIAGNOSIS — F41.1 GENERALIZED ANXIETY DISORDER WITH PANIC ATTACKS: Primary | ICD-10-CM

## 2025-02-13 DIAGNOSIS — F51.05 INSOMNIA DUE TO MENTAL DISORDER: ICD-10-CM

## 2025-02-13 PROBLEM — F43.23 ADJUSTMENT DISORDER WITH MIXED ANXIETY AND DEPRESSED MOOD: Status: ACTIVE | Noted: 2017-08-17

## 2025-02-13 NOTE — PROGRESS NOTES
"OUTPATIENT PSYCHIATRY RETURN VISIT    ENCOUNTER DATE:  2/13/25   SITE:  [facility], [facility]  LENGTH OF SESSION:  23 minutes    The patient location is:  [place], not in a healthcare facility  Visit type:  audiovisual    Face to Face time with patient:  23 minutes  30 minutes of total time spent on the encounter, which includes face to face time and non-face to face time preparing to see the patient (eg, review of tests), Obtaining and/or reviewing separately obtained history, Documenting clinical information in the electronic or other health record, Independently interpreting results (not separately reported) and communicating results to the patient/family/caregiver, or Care coordination (not separately reported).     Each patient to whom he or she provides medical services by telemedicine is:  (1) informed of the relationship between the physician and patient and the respective role of any other health care provider with respect to management of the patient; and (2) notified that he or she may decline to receive medical services by telemedicine and may withdraw from such care at any time.    CHIEF COMPLAINT:  Depression, Anxiety, and Insomnia      HISTORY OF PRESENTING ILLNESS:  Sarah Lee is a 39 y.o. female with history of Anxiety, Depression, and Insomnia who presents for follow up appointment.     Plan at last appointment on 9/24/2024:  Restart Lexapro 10mg daily x 1 week and then 20mg daily.  Restart Wellbutrin XL 150mg daily  Restart Klonopin 0.5-1mg qHS PRN insomnia.   Discussed with patient informed consent, risks versus benefits, alternative treatments, side effect profile and the inherent unpredictability of individual responses to these treatments.  The patient expresses understanding of the above and displays the capacity to agree with this current plan.    History as told by patient:  Says she is feeling "so tired."  Made a drastic change that is really affecting her.  No " longer seeing child's father because it was mentally draining for her - was with him 14 years.  He never showed he cared about her or the kids.  Let him go on Monday.  But he was the only person she had for support.  She has not been eating or sleeping or anything since it happened.  Has slept only 2-3 hours the last few nights.  Taking Klonopin 0.5mg but that isn't helping her sleep.  He was helping with the kids when he was home but he wasn't home much.  He was helping financially some.  Father's birthday is tomorrow, mother's birthday is Monday - both are  so this is adding to her grief.  She denies SI.  States she is having trouble seeing a purpose in life.  Can't do anything without crying.  Scared to move forward without him and doesn't know what will happen.  Just started her medications back 2-3 weeks ago.  Started feeling tired and knew she was about to end their relationship so knew she needed to restart.  Not functioning well at work - having outbursts.  But wants to continue working as a distraction and for finances.  For custody, she will have them M-F and he will have them on the weekends.  He is off shore this weekend and won't be back until next Thursday.  She hasn't told the kids and is stressed about what to say.    Medication side effects:  Denies  Medication compliance:  Yes    PSYCHIATRIC REVIEW OF SYSTEMS:  Trouble with sleep:  Yes  Appetite changes:  Decreased  Weight changes:  Not discussed  Lack of energy:  Yes  Anhedonia:  Yes  Somatic symptoms:  Denies  Libido:  Not discussed  Anxiety/panic:  Yes   Guilty/hopeless:  Yes  Self-injurious behavior/risky behavior:  Denies  Any drugs:  Denies  Alcohol:  Denies  Breastfeeding:  Denies    MEDICAL REVIEW OF SYSTEMS:  Complete review of systems performed covering Constitutional, Musculoskeletal, Neurologic.  All systems negative except for that covered in HPI.    PAST PSYCHIATRIC, MEDICAL, AND SOCIAL HISTORY REVIEWED  The patient's past  "medical, family and social history have been reviewed and updated as appropriate within the electronic medical record - see encounter notes.    MEDICATIONS:    Current Outpatient Medications:     buPROPion (WELLBUTRIN XL) 150 MG TB24 tablet, Take 1 tablet (150 mg total) by mouth every morning., Disp: 30 tablet, Rfl: 5    clonazePAM (KLONOPIN) 0.5 MG tablet, Take 1-2 tablets (0.5-1 mg total) by mouth nightly as needed (Insomnia)., Disp: 60 tablet, Rfl: 2    EScitalopram oxalate (LEXAPRO) 20 MG tablet, Take 1 tablet (20 mg total) by mouth every morning., Disp: 30 tablet, Rfl: 5    omeprazole (PRILOSEC) 40 MG capsule, Take 1 capsule (40 mg total) by mouth 2 (two) times daily before meals., Disp: 60 capsule, Rfl: 2    ALLERGIES:  Review of patient's allergies indicates:  No Known Allergies    PSYCHIATRIC EXAM:  There were no vitals filed for this visit.    Appearance:  Well groomed, appearing healthy and of stated age  Behavior:  Cooperative, pleasant, no psychomotor agitation or retardation  Speech:  Normal rate, rhythm, prosody, and volume  Mood:  "Tired"  Affect:  Depressed, tearful  Thought Process:  Linear, logical, goal directed  Thought Content:  Negative for suicidal ideation, homicidal ideation, delusions or hallucinations.  Associations:  Intact  Memory:  Grossly Intact  Level of Consciousness/Orientation:  Grossly intact  Fund of Knowledge:  Good  Attention:  Good  Language:  Fluent, able to name abstract and concrete objects  Insight:  Fair  Judgment:  Intact  Psychomotor signs:  No involuntary movements of face   Gait:  Unable to assess via virtual visit      RELEVANT LABS/STUDIES:    Lab Results   Component Value Date    WBC 15.04 (H) 11/24/2022    HGB 11.9 (L) 11/24/2022    HCT 34.9 (L) 11/24/2022    MCV 82 11/24/2022     11/24/2022     BMP  Lab Results   Component Value Date     11/23/2022    K 4.2 11/23/2022     11/23/2022    CO2 21 (L) 11/23/2022    BUN 8 11/23/2022    CREATININE 0.8 " 11/23/2022    CALCIUM 9.0 11/23/2022    ANIONGAP 8 11/23/2022    ESTGFRAFRICA >60.0 12/13/2021    EGFRNONAA >60.0 12/13/2021     Lab Results   Component Value Date    ALT 13 11/23/2022    AST 18 11/23/2022    ALKPHOS 135 11/23/2022    BILITOT 0.7 11/23/2022     Lab Results   Component Value Date    TSH 0.650 12/13/2021     Lab Results   Component Value Date    HGBA1C 4.9 12/13/2021       IMPRESSION:    Sarah Lee is a 39 y.o. female with history of Anxiety, Depression, and Insomnia who presents for follow up appointment.      Status/Progress:  Based on the examination today, the patient's problem(s) is/are inadequately controlled.  New problems have not been presented today.    Risk Parameters:  Patient reports no suicidal ideation  Patient reports no homicidal ideation  Patient reports no self-injurious behavior  Patient reports no violent behavior      DIAGNOSES:    ICD-10-CM ICD-9-CM   1. Generalized anxiety disorder with panic attacks  F41.1 300.02    F41.0 300.01   2. MDD (major depressive disorder), recurrent episode, moderate  F33.1 296.32   3. Insomnia due to mental disorder  F51.05 300.9     327.02     PLAN:  Patient just restarted her medications 3 weeks ago.  Continue Lexapro 20mg daily and Wellbutrin XL 150mg daily.  While waiting for these medications to work, she will start taking Klonopin 0.25mg in the morning, Klonopin 0.25mg in the afternoon, and Klonopin 1mg qHS.    Will fill out intermittent FMLA.  Discussed with patient informed consent, risks versus benefits, alternative treatments, side effect profile and the inherent unpredictability of individual responses to these treatments.  The patient expresses understanding of the above and displays the capacity to agree with this current plan.    RETURN TO CLINIC:  Follow up in 3 weeks (on 3/6/2025). In person

## 2025-02-26 NOTE — PROGRESS NOTES
On Levothyroxine 50 mcg daily.  TSH is normal. Continue same.     Please note the error in my report:    Patient was reactive in the PNT unit thus giving BPP 10/10. The portion of tracing that I accidentally reviewed was from the OB ED portion. Please see amended ultrasound report.

## 2025-03-06 ENCOUNTER — OFFICE VISIT (OUTPATIENT)
Dept: PSYCHIATRY | Facility: CLINIC | Age: 40
End: 2025-03-06
Payer: COMMERCIAL

## 2025-03-06 VITALS
SYSTOLIC BLOOD PRESSURE: 137 MMHG | DIASTOLIC BLOOD PRESSURE: 81 MMHG | HEART RATE: 86 BPM | BODY MASS INDEX: 25.09 KG/M2 | WEIGHT: 146.19 LBS

## 2025-03-06 DIAGNOSIS — F41.0 PANIC DISORDER: ICD-10-CM

## 2025-03-06 DIAGNOSIS — F51.05 INSOMNIA DUE TO MENTAL DISORDER: ICD-10-CM

## 2025-03-06 DIAGNOSIS — F33.1 MDD (MAJOR DEPRESSIVE DISORDER), RECURRENT EPISODE, MODERATE: ICD-10-CM

## 2025-03-06 DIAGNOSIS — F41.1 GENERALIZED ANXIETY DISORDER WITH PANIC ATTACKS: Primary | ICD-10-CM

## 2025-03-06 DIAGNOSIS — F41.0 GENERALIZED ANXIETY DISORDER WITH PANIC ATTACKS: Primary | ICD-10-CM

## 2025-03-06 PROCEDURE — 99214 OFFICE O/P EST MOD 30 MIN: CPT | Mod: S$GLB,,, | Performed by: INTERNAL MEDICINE

## 2025-03-06 PROCEDURE — 99999 PR PBB SHADOW E&M-EST. PATIENT-LVL III: CPT | Mod: PBBFAC,,, | Performed by: INTERNAL MEDICINE

## 2025-03-06 RX ORDER — CLONAZEPAM 0.5 MG/1
TABLET ORAL
Qty: 90 TABLET | Refills: 2 | Status: SHIPPED | OUTPATIENT
Start: 2025-03-06

## 2025-03-06 NOTE — PROGRESS NOTES
OUTPATIENT PSYCHIATRY RETURN VISIT    ENCOUNTER DATE:  3/6/25   SITE:  [facility], [address]  LENGTH OF SESSION:  20 minutes    CHIEF COMPLAINT:  Adjustment      HISTORY OF PRESENTING ILLNESS:  Sarah Lee is a 39 y.o. female with history of Anxiety, Depression, and Insomnia who presents for follow up appointment.     Plan at last appointment on 2/13/2025:  Patient just restarted her medications 3 weeks ago.  Continue Lexapro 20mg daily and Wellbutrin XL 150mg daily.  While waiting for these medications to work, she will start taking Klonopin 0.25mg in the morning, Klonopin 0.25mg in the afternoon, and Klonopin 1mg qHS.    Will fill out intermittent FMLA.  Discussed with patient informed consent, risks versus benefits, alternative treatments, side effect profile and the inherent unpredictability of individual responses to these treatments.  The patient expresses understanding of the above and displays the capacity to agree with this current plan.    History as told by patient:  Today is good.  He has the kids for the first time.  Last time he was supposed to take them he didn't.  Today she is feeling like herself again.  First break she has had since having the baby (2.5 years ago).  Doesn't know what to do with herself - may get some coffee this afternoon and then go out to eat tonight.  It is nice feeling like herself again because it has been so so long.  Has not needed Klonopin at work yet.  Has been taking Klonopin 0.25mg every morning and 1mg at night.  Sleeping well the last few nights.  Work isn't as bad - not as irritable.  People don't show up and you end up doing your job and other people's jobs so that can be stressful.  Sleeping well with Klonopin at night.      Medication side effects:  Denies  Medication compliance:  Yes    PSYCHIATRIC REVIEW OF SYSTEMS:  Trouble with sleep:  Improving on Klonopin  Appetite changes:  Improving  Weight changes:  Denies  Lack of energy:   "Denies  Anhedonia:  Sometimes  Somatic symptoms:  Denies  Libido:  Not discussed  Anxiety/panic:  Sometimes, feeling better today  Guilty/hopeless:  Denies  Self-injurious behavior/risky behavior:  Denies  Any drugs:  Denies  Alcohol:  Denies  Breastfeeding:  Denies    MEDICAL REVIEW OF SYSTEMS:  Complete review of systems performed covering Constitutional, Musculoskeletal, Neurologic.  All systems negative except for that covered in HPI.    PAST PSYCHIATRIC, MEDICAL, AND SOCIAL HISTORY REVIEWED  The patient's past medical, family and social history have been reviewed and updated as appropriate within the electronic medical record - see encounter notes.    MEDICATIONS:    Current Outpatient Medications:     buPROPion (WELLBUTRIN XL) 150 MG TB24 tablet, Take 1 tablet (150 mg total) by mouth every morning., Disp: 30 tablet, Rfl: 5    clonazePAM (KLONOPIN) 0.5 MG tablet, Take 1/2 tablet (0.25mg) by mouth every morning, 1/2 tablet (0.25mg) PRN anxiety in the afternoon, and 2 tablets (1mg) every night., Disp: 90 tablet, Rfl: 2    EScitalopram oxalate (LEXAPRO) 20 MG tablet, Take 1 tablet (20 mg total) by mouth every morning., Disp: 30 tablet, Rfl: 5    omeprazole (PRILOSEC) 40 MG capsule, Take 1 capsule (40 mg total) by mouth 2 (two) times daily before meals., Disp: 60 capsule, Rfl: 2    ALLERGIES:  Review of patient's allergies indicates:  No Known Allergies    PSYCHIATRIC EXAM:  Vitals:    03/06/25 1119   BP: 137/81   Pulse: 86   Weight: 66.3 kg (146 lb 2.6 oz)       Appearance:  Well groomed, appearing healthy and of stated age  Behavior:  Cooperative, pleasant, no psychomotor agitation or retardation  Speech:  Normal rate, rhythm, prosody, and volume  Mood:  "Better today"  Affect:  Bright  Thought Process:  Linear, logical, goal directed  Thought Content:  Negative for suicidal ideation, homicidal ideation, delusions or hallucinations.  Associations:  Intact  Memory:  Grossly Intact  Level of " Consciousness/Orientation:  Grossly intact  Fund of Knowledge:  Good  Attention:  Good  Language:  Fluent, able to name abstract and concrete objects  Insight:  Fair  Judgment:  Intact  Psychomotor signs:  No involuntary movements or tremor  Gait:  Normal      RELEVANT LABS/STUDIES:    Lab Results   Component Value Date    WBC 15.04 (H) 11/24/2022    HGB 11.9 (L) 11/24/2022    HCT 34.9 (L) 11/24/2022    MCV 82 11/24/2022     11/24/2022     BMP  Lab Results   Component Value Date     11/23/2022    K 4.2 11/23/2022     11/23/2022    CO2 21 (L) 11/23/2022    BUN 8 11/23/2022    CREATININE 0.8 11/23/2022    CALCIUM 9.0 11/23/2022    ANIONGAP 8 11/23/2022    ESTGFRAFRICA >60.0 12/13/2021    EGFRNONAA >60.0 12/13/2021     Lab Results   Component Value Date    ALT 13 11/23/2022    AST 18 11/23/2022    ALKPHOS 135 11/23/2022    BILITOT 0.7 11/23/2022     Lab Results   Component Value Date    TSH 0.650 12/13/2021     Lab Results   Component Value Date    HGBA1C 4.9 12/13/2021       IMPRESSION:    Sarah Lee is a 39 y.o. female with history of Anxiety, Depression, and Insomnia who presents for follow up appointment.      Status/Progress:  Based on the examination today, the patient's problem(s) is/are  improving .  New problems have not been presented today.    Risk Parameters:  Patient reports no suicidal ideation  Patient reports no homicidal ideation  Patient reports no self-injurious behavior  Patient reports no violent behavior      DIAGNOSES:    ICD-10-CM ICD-9-CM   1. Generalized anxiety disorder with panic attacks  F41.1 300.02    F41.0 300.01   2. Panic disorder  F41.0 300.01   3. Insomnia due to mental disorder  F51.05 300.9     327.02   4. MDD (major depressive disorder), recurrent episode, moderate  F33.1 296.32       PLAN:  Symptoms improving.    Continue Lexapro 20mg daily for depression and anxiety.  Continue Wellbutrin XL 150mg daily for depression..  Continue Klonopin 0.25mg in the  morning, Klonopin 0.25mg PRN anxiety in the afternoon, and Klonopin 1mg qHS.    Discussed with patient informed consent, risks versus benefits, alternative treatments, side effect profile and the inherent unpredictability of individual responses to these treatments.  The patient expresses understanding of the above and displays the capacity to agree with this current plan.  She is no longer seeing Glenny.      RETURN TO CLINIC:  Follow up in about 4 weeks (around 4/3/2025).

## 2025-06-26 ENCOUNTER — PATIENT OUTREACH (OUTPATIENT)
Dept: ADMINISTRATIVE | Facility: HOSPITAL | Age: 40
End: 2025-06-26
Payer: COMMERCIAL